# Patient Record
Sex: MALE | Race: WHITE | NOT HISPANIC OR LATINO | Employment: OTHER | URBAN - METROPOLITAN AREA
[De-identification: names, ages, dates, MRNs, and addresses within clinical notes are randomized per-mention and may not be internally consistent; named-entity substitution may affect disease eponyms.]

---

## 2020-05-14 ENCOUNTER — HOSPITAL ENCOUNTER (INPATIENT)
Facility: HOSPITAL | Age: 75
LOS: 12 days | Discharge: NURSING, REHAB OR LONG-TERM CARE | DRG: 064 | End: 2020-05-26
Attending: ANESTHESIOLOGY | Admitting: ANESTHESIOLOGY
Payer: MEDICARE

## 2020-05-14 DIAGNOSIS — E87.0 HYPERNATREMIA: ICD-10-CM

## 2020-05-14 DIAGNOSIS — I61.2 NONTRAUMATIC HEMORRHAGE OF LEFT CEREBRAL HEMISPHERE: ICD-10-CM

## 2020-05-14 DIAGNOSIS — I71.40 AAA (ABDOMINAL AORTIC ANEURYSM) WITHOUT RUPTURE: ICD-10-CM

## 2020-05-14 DIAGNOSIS — I61.9 NONTRAUMATIC INTRACEREBRAL HEMORRHAGE: ICD-10-CM

## 2020-05-14 DIAGNOSIS — I61.0 BASAL GANGLIA HEMORRHAGE: ICD-10-CM

## 2020-05-14 DIAGNOSIS — R06.82 TACHYPNEA: ICD-10-CM

## 2020-05-14 DIAGNOSIS — I61.9 ICH (INTRACEREBRAL HEMORRHAGE): ICD-10-CM

## 2020-05-14 DIAGNOSIS — G81.91 HEMIPARESIS, RIGHT: ICD-10-CM

## 2020-05-14 DIAGNOSIS — I10 ESSENTIAL HYPERTENSION: ICD-10-CM

## 2020-05-14 DIAGNOSIS — R13.12 OROPHARYNGEAL DYSPHAGIA: ICD-10-CM

## 2020-05-14 PROBLEM — E86.1 HYPOVOLEMIA: Status: ACTIVE | Noted: 2020-05-14

## 2020-05-14 PROBLEM — R47.1 DYSARTHRIA: Status: ACTIVE | Noted: 2020-05-14

## 2020-05-14 PROBLEM — I61.1 NONTRAUMATIC CORTICAL HEMORRHAGE OF LEFT CEREBRAL HEMISPHERE: Status: ACTIVE | Noted: 2020-05-14

## 2020-05-14 PROBLEM — G93.6 CYTOTOXIC CEREBRAL EDEMA: Status: ACTIVE | Noted: 2020-05-14

## 2020-05-14 LAB
ABO + RH BLD: NORMAL
BILIRUB UR QL STRIP: NEGATIVE
BLD GP AB SCN CELLS X3 SERPL QL: NORMAL
CHOLEST SERPL-MCNC: 183 MG/DL (ref 120–199)
CHOLEST SERPL-MCNC: 183 MG/DL (ref 120–199)
CHOLEST/HDLC SERPL: 3.4 {RATIO} (ref 2–5)
CHOLEST/HDLC SERPL: 3.4 {RATIO} (ref 2–5)
CLARITY UR REFRACT.AUTO: CLEAR
COLOR UR AUTO: YELLOW
ESTIMATED AVG GLUCOSE: 108 MG/DL (ref 68–131)
ESTIMATED AVG GLUCOSE: 108 MG/DL (ref 68–131)
GLUCOSE UR QL STRIP: ABNORMAL
HBA1C MFR BLD HPLC: 5.4 % (ref 4–5.6)
HBA1C MFR BLD HPLC: 5.4 % (ref 4–5.6)
HDLC SERPL-MCNC: 54 MG/DL (ref 40–75)
HDLC SERPL-MCNC: 54 MG/DL (ref 40–75)
HDLC SERPL: 29.5 % (ref 20–50)
HDLC SERPL: 29.5 % (ref 20–50)
HGB UR QL STRIP: ABNORMAL
KETONES UR QL STRIP: ABNORMAL
LDLC SERPL CALC-MCNC: 117.2 MG/DL (ref 63–159)
LDLC SERPL CALC-MCNC: 117.2 MG/DL (ref 63–159)
LEUKOCYTE ESTERASE UR QL STRIP: NEGATIVE
MICROSCOPIC COMMENT: ABNORMAL
NITRITE UR QL STRIP: NEGATIVE
NONHDLC SERPL-MCNC: 129 MG/DL
NONHDLC SERPL-MCNC: 129 MG/DL
PH UR STRIP: 8 [PH] (ref 5–8)
PROT UR QL STRIP: NEGATIVE
RBC #/AREA URNS AUTO: 52 /HPF (ref 0–4)
SP GR UR STRIP: 1.01 (ref 1–1.03)
TRIGL SERPL-MCNC: 59 MG/DL (ref 30–150)
TRIGL SERPL-MCNC: 59 MG/DL (ref 30–150)
TSH SERPL DL<=0.005 MIU/L-ACNC: 1.11 UIU/ML (ref 0.4–4)
TSH SERPL DL<=0.005 MIU/L-ACNC: 1.11 UIU/ML (ref 0.4–4)
URN SPEC COLLECT METH UR: ABNORMAL

## 2020-05-14 PROCEDURE — 92610 EVALUATE SWALLOWING FUNCTION: CPT

## 2020-05-14 PROCEDURE — 99291 PR CRITICAL CARE, E/M 30-74 MINUTES: ICD-10-PCS | Mod: GC,,, | Performed by: ANESTHESIOLOGY

## 2020-05-14 PROCEDURE — 36620 ARTERIAL LINE: ICD-10-PCS | Mod: ,,, | Performed by: INTERNAL MEDICINE

## 2020-05-14 PROCEDURE — 94761 N-INVAS EAR/PLS OXIMETRY MLT: CPT

## 2020-05-14 PROCEDURE — 99223 PR INITIAL HOSPITAL CARE,LEVL III: ICD-10-PCS | Mod: GC,,, | Performed by: PSYCHIATRY & NEUROLOGY

## 2020-05-14 PROCEDURE — 25000003 PHARM REV CODE 250

## 2020-05-14 PROCEDURE — 27000221 HC OXYGEN, UP TO 24 HOURS

## 2020-05-14 PROCEDURE — 86901 BLOOD TYPING SEROLOGIC RH(D): CPT

## 2020-05-14 PROCEDURE — 84443 ASSAY THYROID STIM HORMONE: CPT

## 2020-05-14 PROCEDURE — 20000000 HC ICU ROOM

## 2020-05-14 PROCEDURE — 81001 URINALYSIS AUTO W/SCOPE: CPT

## 2020-05-14 PROCEDURE — 25000003 PHARM REV CODE 250: Performed by: STUDENT IN AN ORGANIZED HEALTH CARE EDUCATION/TRAINING PROGRAM

## 2020-05-14 PROCEDURE — 80061 LIPID PANEL: CPT

## 2020-05-14 PROCEDURE — 99223 1ST HOSP IP/OBS HIGH 75: CPT | Mod: GC,,, | Performed by: PSYCHIATRY & NEUROLOGY

## 2020-05-14 PROCEDURE — 99223 PR INITIAL HOSPITAL CARE,LEVL III: ICD-10-PCS | Mod: ,,, | Performed by: PHYSICIAN ASSISTANT

## 2020-05-14 PROCEDURE — 99291 CRITICAL CARE FIRST HOUR: CPT | Mod: GC,,, | Performed by: ANESTHESIOLOGY

## 2020-05-14 PROCEDURE — 99223 1ST HOSP IP/OBS HIGH 75: CPT | Mod: ,,, | Performed by: PHYSICIAN ASSISTANT

## 2020-05-14 PROCEDURE — 83036 HEMOGLOBIN GLYCOSYLATED A1C: CPT

## 2020-05-14 PROCEDURE — 36620 INSERTION CATHETER ARTERY: CPT | Mod: ,,, | Performed by: INTERNAL MEDICINE

## 2020-05-14 RX ORDER — NICARDIPINE HYDROCHLORIDE 0.2 MG/ML
2.5 INJECTION INTRAVENOUS CONTINUOUS
Status: DISCONTINUED | OUTPATIENT
Start: 2020-05-14 | End: 2020-05-15

## 2020-05-14 RX ORDER — LIDOCAINE HYDROCHLORIDE 10 MG/ML
1 INJECTION INFILTRATION; PERINEURAL ONCE
Status: COMPLETED | OUTPATIENT
Start: 2020-05-14 | End: 2020-05-14

## 2020-05-14 RX ORDER — SODIUM CHLORIDE 0.9 % (FLUSH) 0.9 %
10 SYRINGE (ML) INJECTION
Status: DISCONTINUED | OUTPATIENT
Start: 2020-05-14 | End: 2020-05-26 | Stop reason: HOSPADM

## 2020-05-14 RX ORDER — SODIUM CHLORIDE 9 MG/ML
INJECTION, SOLUTION INTRAVENOUS CONTINUOUS
Status: DISCONTINUED | OUTPATIENT
Start: 2020-05-14 | End: 2020-05-20

## 2020-05-14 RX ORDER — LIDOCAINE HYDROCHLORIDE 10 MG/ML
INJECTION, SOLUTION EPIDURAL; INFILTRATION; INTRACAUDAL; PERINEURAL
Status: COMPLETED
Start: 2020-05-14 | End: 2020-05-14

## 2020-05-14 RX ADMIN — NICARDIPINE HYDROCHLORIDE 12.5 MG/HR: 0.2 INJECTION, SOLUTION INTRAVENOUS at 11:05

## 2020-05-14 RX ADMIN — SODIUM CHLORIDE: 0.9 INJECTION, SOLUTION INTRAVENOUS at 01:05

## 2020-05-14 RX ADMIN — LIDOCAINE HYDROCHLORIDE 1 ML: 10 INJECTION INFILTRATION; PERINEURAL at 03:05

## 2020-05-14 RX ADMIN — SODIUM CHLORIDE 1000 ML: 0.9 INJECTION, SOLUTION INTRAVENOUS at 11:05

## 2020-05-14 RX ADMIN — LIDOCAINE HYDROCHLORIDE 1 ML: 10 INJECTION, SOLUTION EPIDURAL; INFILTRATION; INTRACAUDAL at 03:05

## 2020-05-14 RX ADMIN — NICARDIPINE HYDROCHLORIDE 15 MG/HR: 0.2 INJECTION, SOLUTION INTRAVENOUS at 11:05

## 2020-05-14 NOTE — PLAN OF CARE
Due to COVID 19 restrictions limiting patient contact and visits, Discharge Planning Assessment completed via phone with patient's wife, Unique Vega [phone].    Patient is unable to answer questions (dysarthria).  Per wife, the patient lives with her in a single story house with no step(s) to enter.   Per wife, the patient was independent with ADLS and used no dme for ambulation.  Patient will have assistance from wife upon discharge.   Wife stated that if inpatient rehab is needed, she would like [facility].  Per chart, patient is legally blind at baseline.    All questions addressed.  CM will follow for needs.       05/14/20 1440   Discharge Assessment   Assessment Type Discharge Planning Assessment   Confirmed/corrected address and phone number on facesheet? Yes   Assessment information obtained from? Caregiver  (wife, Unique Vega [phone])   Expected Length of Stay (days) 5   Communicated expected length of stay with patient/caregiver yes   Prior to hospitilization cognitive status: Alert/Oriented   Prior to hospitalization functional status: Independent   Current cognitive status: Unable to Assess   Current Functional Status:   (stroke)   Facility Arrived From: [facility]   Lives With spouse   Able to Return to Prior Arrangements yes   Who are your caregiver(s) and their phone number(s)? wife, Unique Vega [phone]   Patient's perception of discharge disposition other (comments)  (hayden)   Readmission Within the Last 30 Days no previous admission in last 30 days   Patient currently being followed by outpatient case management? No   Patient currently receives any other outside agency services? No   Equipment Currently Used at Home none   Do you have any problems affording any of your prescribed medications? No   Is the patient taking medications as prescribed? yes   Does the patient have transportation home? Yes   Transportation Anticipated family or friend will provide   Does the patient receive  services at the [facility]? No   Discharge Plan A Rehab   Discharge Plan B Skilled Nursing Facility;Home Health   DME Needed Upon Discharge  other (see comments)  (tbd)   Patient/Family in Agreement with Plan yes                PCP:  Elyse Parham MD        Pharmacy:    [facility] - [place], [place] - [address]  [address] [place] [address]  Phone: [phone] Fax: [phone]        Emergency Contacts:  Extended Emergency Contact Information  Primary Emergency Contact: RAFAL DORANTES  Mobile Phone: [phone]  Relation: Spouse  Preferred language: English   needed? No  Secondary Emergency Contact: LEONARD DORANTES JR  Mobile Phone: [phone]  Relation: Son  Preferred language: English   needed? No      Insurance:    Payor: [employer] / Plan: [employer] CHOICES 65 / Product Type: Medicare Advantage /       05/14/2020  2:43 PM

## 2020-05-14 NOTE — NURSING
Patient arrived to [facility] from [place]  by Ann Reynolds    Type of stroke/diagnosis:  L ICH    TPA start and end time (if applicable) n/a    Thrombectomy start and end time (if applicable) n/a    Current symptoms: R sided weakness, dysarthria, decreased sensation on R side    Skin assessment done: Yes  Wounds noted: None  RENAN Armband Applied: Yes    NCC notified: SUNSHINE Long

## 2020-05-14 NOTE — HPI
74 yo M with no known PMHx (pt has never had PCP) presents to [facility] 05/14/20 due to finding of L ICH with IVH after presenting to [facility] with R hemiparesis and severe dysarthria.  Onset early this am.  Does have baseline vision deficit in R eye with worse central vision (noted by Neuro ICU to be legally blind).   He did have BP noted at OSH of 247/129 around 08:21 5/14/20.  Patient denies hx of headaches or intermittent vision blurring prior to admission.  Denies head trauma.  Was sleeping, on trying to move in bed could not move R side.  He otherwise denies personal or family hx of stroke, TIA, aneurysm, bleeding disorders, or clotting disorders.  No tobacco use for several years (> 20 yrs since he quit but did smoke again about 4-5 years ago for a few months).  Denies EtOH use.  Denies illicit use. Currently, pt has severe dysarthria, is somnolent, and has R-sided hemiparesis.  Denies h/a, nausea, or vision change from baseline.

## 2020-05-14 NOTE — PROCEDURES
"Brett Vega is a 75 y.o. male patient.    Temp: 98.6 °F (37 °C) (20 1505)  Pulse: (!) 116 (20 1505)  Resp: 20 (20 1505)  BP: (!) 153/85 (20 1505)  SpO2: 95 % (20 1505)  Weight: 95.4 kg (210 lb 5.1 oz) (20 1025)  Height: 5' 10" (177.8 cm) (20 1220)       Arterial Line  Date/Time: 2020 3:51 PM  Location procedure was performed: [facility]URO [facility]  Performed by: Tray Scott MD  Authorized by: Tray Scott MD   Assisting provider: Tray Scott MD  Pre-op Diagnosis: ICH  Post-operative diagnosis: ICH  Consent Done: Yes  Consent: Written consent obtained.  Risks and benefits: risks, benefits and alternatives were discussed  Consent given by: patient  Patient understanding: patient states understanding of the procedure being performed  Patient consent: the patient's understanding of the procedure matches consent given  Procedure consent: procedure consent matches procedure scheduled  Relevant documents: relevant documents present and verified  Test results: test results available and properly labeled  Site marked: the operative site was marked  Imaging studies: imaging studies available  Patient identity confirmed: , MRN, name, provided demographic data and verbally with patient  Time out: Immediately prior to procedure a "time out" was called to verify the correct patient, procedure, equipment, support staff and site/side marked as required.  Preparation: Patient was prepped and draped in the usual sterile fashion.  Indications: multiple ABGs and hemodynamic monitoring  Location: right radial    Anesthesia:  Local Anesthetic: lidocaine 1% without epinephrine  Patient sedated: no  Hermann's test normal: yes  Needle gauge: 22  Seldinger technique: Seldinger technique used  Number of attempts: 1  Complications: No  Estimated blood loss (mL): 1  Specimens: No  Implants: No  Post-procedure: dressing applied  Patient tolerance: Patient tolerated " the procedure well with no immediate complications          Tray Scott  5/14/2020

## 2020-05-14 NOTE — CONSULTS
Inpatient consult to Physical Medicine Rehab  Consult performed by: Gemini Eduardo NP  Consult ordered by: Krystal Alex MD  Reason for consult: assess rehab needs        Reviewed patient history and current admission.  Rehab team following.  Full consult to follow.    SHADE Martinez, FNP-C  Physical Medicine & Rehabilitation   05/14/2020

## 2020-05-14 NOTE — ASSESSMENT & PLAN NOTE
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  Of note, patient had SBP 240s documented at OSH.  Pt otherwise has no clear risk factors for ICH.  Does not appear to have cognitive deficits on exam, no reported concern for memory issues.  No medications, no EtOH use.  No recent trauma.  No unexplained weight loss or B symptoms.  Admitted to Neuro ICU w/ NSGY following.  Etiology likely hypertensive, rule out vascular malformation w/ further imaging.    Antithrombotics for secondary stroke prevention:  None--ICH  Statins for secondary stroke prevention/HLD: LDL elevated--would not start statin acutely in setting of ICH, but could discuss low dose statin for reduction of stroke risk factors in stroke clinic follow up  Aggressive risk factor modification: HTN  Rehab efforts: PT/OT/SLP/PM&R  Diagnostics ordered/pending: CTA head to eval for vascular malformation  VTE prophylaxis: None: Reason for No Pharmacological VTE Prophylaxis: History of systemic or intracranial bleeding  BP parameters: ICH: SBP <140

## 2020-05-14 NOTE — ASSESSMENT & PLAN NOTE
76 y/o M w/ no known PMH who presented with new onset RSW and dysarthria, found to have L caudate tail/thalamic ICH with IVH (ICH score 2):    --Patient admitted to [facility] on telemetry      -q1h neurochecks in ICU  --All labs and diagnostics reviewed  --CTH 5/14 shows L caudate/thalamic ICH with IVH in lateral ventricles (L>R), 3rd, and 4th ventricles. Mild prominence of ventricles but no overt hydrocephalus.  --Follow-up CTH 6h after initial for stability, ordered for 2pm today  --No EVD or other surgical intervention at this time, will follow exam closely for any deterioration and FU repeat scan.  --No anti-plt/coag medications  --HTN: SBP <140 (continue cardene ggt; hydralazine & labetalol PRN)  --Na >135  --HOB >30  --Full pre-op labs reviewed, coags WNL, T&S in process   --Covid test negative 5/14  --Keep NPO at this time for possible operative intervention  --Continue to monitor clinically, notify NSGY immediately with any changes in neuro status  --Please call with any questions or concerns    Dispo: TBD    Discussed with Dr. Cuevas

## 2020-05-14 NOTE — SUBJECTIVE & OBJECTIVE
No past medical history on file.  No past surgical history on file.   Current Facility-Administered Medications on File Prior to Encounter   Medication Dose Route Frequency Provider Last Rate Last Dose    [DISCONTINUED] niCARdipine 40 mg/200 mL infusion  5 mg/hr Intravenous Continuous Brett Alcazar MD        [DISCONTINUED] niCARdipine 40 mg/200 mL infusion  5 mg/hr Intravenous Continuous Brett Alcazar MD 37.5 mL/hr at 05/14/20 0920 7.5 mg/hr at 05/14/20 0920     No current outpatient medications on file prior to encounter.      Allergies: Patient has no known allergies.    No family history on file.  Social History     Tobacco Use    Smoking status: Not on file   Substance Use Topics    Alcohol use: Not on file    Drug use: Not on file     Review of Systems   Constitutional: Negative for chills, diaphoresis and fever.   HENT: Negative for congestion.    Eyes: Positive for visual disturbance.   Respiratory: Negative for wheezing.    Cardiovascular: Negative for chest pain and palpitations.   Gastrointestinal: Negative for abdominal distention and abdominal pain.   Neurological: Positive for facial asymmetry, speech difficulty and weakness. Negative for seizures and headaches.   Psychiatric/Behavioral: Negative for agitation and behavioral problems.        Objective:     Vitals:         Temp  Min: 97.8 °F (36.6 °C)  Max: 97.8 °F (36.6 °C)  Pulse  Min: 94  Max: 105  BP  Min: 108/76  Max: 181/96  MAP (mmHg)  Min: 87  Max: 131  Resp  Min: 20  Max: 27  SpO2  Min: 90 %  Max: 93 %    No intake/output data recorded.           Physical Exam   Constitutional: He appears well-developed and well-nourished.   HENT:   Head: Normocephalic and atraumatic.   Eyes: Pupils are equal, round, and reactive to light. Conjunctivae and EOM are normal.   Neck: Normal range of motion. Neck supple.   Cardiovascular: Normal rate, regular rhythm, normal heart sounds and intact distal pulses.   Pulmonary/Chest: Breath sounds  normal.   Abdominal: Soft. Bowel sounds are normal. He exhibits no distension.   Skin: Skin is warm and dry.   Psychiatric: He has a normal mood and affect.     Neurologic exam  Awake, alert, following commands  Dysarthric  PERRL, EOMI  R facial droop, V1-V3 intact  Unable to resist gravity on RUE/RLE, minimal movements  5/5 strength LUE/LLE  Decreased light touch RUE/RLE   Normal FN on the left    Today I personally reviewed pertinent medications, imaging, laboratory results.   CTH:  Evidence of intraparenchymal hemorrhage centered in the region of the posterior limb of the left internal capsule with intraventricular extension.

## 2020-05-14 NOTE — CONSULTS
[facility]  Vascular Neurology  Comprehensive Stroke Center  Consult Note    Consults  Assessment/Plan:     Nontraumatic intracerebral hemorrhage  74 yo M with no known PMHx presents to [facility] 05/14/2020 transferred from OSH for large L thalamic and caudate ICH with intraventricular extension.  Of note, patient had SBP 240s documented at OSH.  Pt otherwise has no clear risk factors for ICH.  Does not appear to have cognitive deficits on exam, no reported concern for memory issues.  No medications, no EtOH use.  No recent trauma.  No unexplained weight loss or B symptoms.  Admitted to Neuro ICU w/ NSGY following.  Etiology likely hypertensive, rule out vascular malformation w/ further imaging.    Antithrombotics for secondary stroke prevention:  None--ICH  Statins for secondary stroke prevention/HLD: LDL elevated--would not start statin acutely in setting of ICH, but could discuss low dose statin for reduction of stroke risk factors in stroke clinic follow up  Aggressive risk factor modification: HTN  Rehab efforts: PT/OT/SLP/PM&R  Diagnostics ordered/pending: CTA head to eval for vascular malformation  VTE prophylaxis: None: Reason for No Pharmacological VTE Prophylaxis: History of systemic or intracranial bleeding  BP parameters: ICH: SBP <140    Essential hypertension  -Stroke risk factor, likely etiology of ICH in this pt  -Goal SBP < 140, consider starting po anti-hypertensives as needed to wean nicardipine    Hemiparesis, right  -2/2 ICH, continue PT eval and treat    Dysarthria  -2/2 ICH, continue SLP eval and treat    STROKE DOCUMENTATION     Acute Stroke Times   Last Known Normal Time: 0300  Symptom Onset Date: 05/14/20  Symptom Onset Time: 0700  Stroke Team Called Time: 0806  Stroke Team Arrival Time: 0809  CT Interpretation Time: 0809  Decision to Treat Time for Alteplase: (Contraindicated 2/2 ICH)  Decision to Treat Time for IR: (no LVO)    NIH Scale:  Interval: baseline  1a. Level of  Consciousness: 1-->Not alert, but arousable by minor stimulation to obey, answer, or respond  1b. LOC Questions: 1-->Answers one question correctly  1c. LOC Commands: 0-->Performs both tasks correctly  2. Best Gaze: 0-->Normal  3. Visual: 1-->Partial hemianopia(Pt notes baseline poor vision in R eye on testing)  4. Facial Palsy: 1-->Minor paralysis (flattened nasolabial fold, asymmetry on smiling)  5a. Motor Arm, Left: 0-->No drift, limb holds 90 (or 45) degrees for full 10 secs  5b. Motor Arm, Right: 3-->No effort against gravity, limb falls  6a. Motor Leg, Left: 0-->No drift, leg holds 30 degree position for full 5 secs  6b. Motor Leg, Right: 3-->No effort against gravity, leg falls to bed immediately  7. Limb Ataxia: 0-->Absent  8. Sensory: 1-->Mild-to-moderate sensory loss, patient feels pinprick is less sharp or is dull on the affected side, or there is a loss of superficial pain with pinprick, but patient is aware of being touched  9. Best Language: 0-->No aphasia, normal  10. Dysarthria: 2-->Severe dysarthria, patients speech is so slurred as to be unintelligible in the absence of or out of proportion to any dysphasia, or is mute/anarthric  11. Extinction and Inattention (formerly Neglect): 0-->No abnormality  Total (NIH Stroke Scale): 13    Modified Falls Score: 0  Tammy Coma Scale:15   ABCD2 Score:    [id number] Score:   HAS -BLED Score:   ICH Score:1  Hunt & Mccray Classification:     Thrombolysis Candidate? No, CT findings (ICH, SAH, etc)     Delays to Thrombolysis?  No    Interventional Revascularization Candidate?   Is the patient eligible for mechanical endovascular reperfusion (JOSE FRANCISCO)?  No; ICH/ SAH     Hemorrhagic change of an Ischemic Stroke: Does this patient have an ischemic stroke with hemorrhagic changes? No     Subjective:     History of Present Illness:  76 yo M with no known PMHx (pt has never had PCP) [place] [facility] [place] due to finding of L ICH with IVH after presenting to [facility] with R hemiparesis and severe dysarthria.  Onset early this am.  Does have baseline vision deficit in R eye with worse central vision (noted by Neuro ICU to be legally blind).   He did have BP noted at OSH of 247/129 around 08:21 5/14/20.  Patient denies hx of headaches or intermittent vision blurring prior to admission.  Denies head trauma.  Was sleeping, on trying to move in bed could not move R side.  He otherwise denies personal or family hx of stroke, TIA, aneurysm, bleeding disorders, or clotting disorders.  No tobacco use for several years (> 20 yrs since he quit but did smoke again about 4-5 years ago for a few months).  Denies EtOH use.  Denies illicit use. Currently, pt has severe dysarthria, is somnolent, and has R-sided hemiparesis.  Denies h/a, nausea, or vision change from baseline.        History reviewed. No pertinent past medical history.  History reviewed. No pertinent surgical history.  History reviewed. No pertinent family history.  Social History     Tobacco Use    Smoking status: Former Smoker    Smokeless tobacco: Never Used   Substance Use Topics    Alcohol use: Not on file    Drug use: Not on file     Review of patient's allergies indicates:  No Known Allergies    Medications: I have reviewed the current medication administration record.    No medications prior to admission.     Current Facility-Administered Medications:     0.9%  NaCl infusion, , Intravenous, Continuous, Krystal Alex MD, Last Rate: 75 mL/hr at 05/14/20 1505    niCARdipine 40 mg/200 mL infusion, 2.5 mg/hr, Intravenous, Continuous, Krystal Alex MD, Last Rate: 62.5 mL/hr at 05/14/20 1505, 12.5 mg/hr at 05/14/20 1505    sodium chloride 0.9% flush 10 mL, 10 mL, Intravenous, PRN, Krystal Alex MD    Review of Systems   Constitutional: Negative for chills and fever.   HENT: Negative for rhinorrhea and sneezing.    Eyes: Positive for visual disturbance (Baseline R eye poor  vision).   Respiratory: Negative for cough and shortness of breath.    Cardiovascular: Negative for palpitations and leg swelling.   Gastrointestinal: Negative for nausea and vomiting.   Musculoskeletal: Negative for neck pain and neck stiffness.   Skin: Negative for rash and wound.   Neurological: Positive for speech difficulty and weakness. Negative for numbness and headaches.   Hematological: Negative for adenopathy. Does not bruise/bleed easily.   Psychiatric/Behavioral: Positive for decreased concentration. Negative for agitation and confusion.     Objective:     Vital Signs (Most Recent):  Temp: 98.6 °F (37 °C) (05/14/20 1505)  Pulse: (!) 116 (05/14/20 1505)  Resp: 20 (05/14/20 1505)  BP: (!) 153/85 (05/14/20 1505)  SpO2: 95 % (05/14/20 1505)    Vital Signs Range (Last 24H):  Temp:  [97.8 °F (36.6 °C)-98.6 °F (37 °C)]   Pulse:  []   Resp:  [18-27]   BP: (108-181)/(59-96)   SpO2:  [90 %-96 %]     Physical Exam  General:  Well-developed, well-nourished, nad  HEENT:  NCAT, PERRLA, EOMI, oropharyngeal membranes non-erythematous/without exudate  Neck:  Supple, normal ROM without nuchal rigidity  Resp:  Symmetric expansion, no increased wob  CVS:  No LE edema, peripheral pulses 2+ (radial, dorsalis pedis)  GI:  Abd soft, non-distended, non-tender to palpation  Neurologic Exam:  Mental Status:  AAOx3.  Speech, thought content appropriate.  Able to spell 'world' forward and 'backward.'  Recent, remote recall 3/3.  Cranial Nerves:  VFs intact on counting fingers in all quadrants bilaterally with testing L eye.  With R eye, unable to see centrally but has some intact peripheral vision.  PERRLA, EOMI.  Face with slight R nasolabial fold flattening, not significant on facial movement.  Palate raises symmetrically, tongue protrudes midline.  Trapezius 5/5 bilaterally.  Motor:  LUE/LLE with normal bulk and tone, 5/5 on strength testing.  RUE flaccid, no movement apart from shoulder   Sensory:  Intact to light touch at  all extremities and face without inattention.  Coordination:  RUE limited, pt declines HTS.  LUE w/ intact FNF.  Gait:  Deferred 2/2 fall risk    Neurological Exam:   LOC: drowsy  Attention Span: Good   Language: No aphasia  Articulation: Dysarthria  Orientation: Person, Place, Time   Visual Fields: Full  Baseline R vision loss--has trouble with central vision in this eye, peripheral vision intact  EOM (CN III, IV, VI): Full/intact  Pupils (CN II, III): PERRL  Facial Sensation (CN V): Normal  Facial Movement (CN VII): Mild R nasolabial fold flattening  Motor: Arm left  Normal 5/5  Leg left  Normal 5/5  Arm right  Plegia 0/5  Leg right Plegia 0/5  Cebellar: No evidence of appendicular or axial ataxia  Sensation: Intact to light touch, temperature and vibration  Tone: Flaccid  LUE  and LLE    Laboratory:  CMP:   Recent Labs   Lab 20  0820   CALCIUM 9.2   ALBUMIN 4.8   PROT 8.6*      K 4.1   CO2 27   CL 99   BUN 11   CREATININE 0.70*   ALKPHOS 68   ALT 17   AST 31   BILITOT 0.7     CBC:   Recent Labs   Lab 20  0820   WBC 9.50   RBC 5.24   HGB 15.0   HCT 46.1      MCV 88   MCH 28.7   MCHC 32.6     Lipid Panel:   Recent Labs   Lab 20  1251   CHOL 183  183   LDLCALC 117.2  117.2   HDL 54  54   TRIG 59  59     Coagulation:   Recent Labs   Lab 20  0820   INR 1.0   APTT 26.0     Hgb A1C:   Recent Labs   Lab 20  1251   HGBA1C 5.4  5.4     TSH:   Recent Labs   Lab 20  1251   TSH 1.114  1.114     Diagnostic Results:    Brain imagin2020 CT head w/o contrast:       Vessel Imaging:  Pending    Cardiac Evaluation:   Pending    Nani Garcia MD  Comprehensive Stroke Center  Department of Vascular Neurology   [facility]

## 2020-05-14 NOTE — HPI
76 yo male with no PMHX and no contact with health care his entire adult life transferred from [facility] after he presented there for new onset of dense right hemiparesis and marked dysarthria. Onset unclear but likely around 3am this morning. No recent illness. Legally blind. BP on arrival to outside facility 247/129. On Cardene ggt. 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 Telemedicine-stroke done by Dr Looney.  Currently pt awake, alert, oriented, following commands. No movement against gravity on the right side. On Cardene ggt. STAT consult to NSGY. Awaiting recs.

## 2020-05-14 NOTE — SUBJECTIVE & OBJECTIVE
History reviewed. No pertinent past medical history.  History reviewed. No pertinent surgical history.  History reviewed. No pertinent family history.  Social History     Tobacco Use    Smoking status: Former Smoker    Smokeless tobacco: Never Used   Substance Use Topics    Alcohol use: Not on file    Drug use: Not on file     Review of patient's allergies indicates:  No Known Allergies    Medications: I have reviewed the current medication administration record.    No medications prior to admission.     Current Facility-Administered Medications:     0.9%  NaCl infusion, , Intravenous, Continuous, Krystal Alex MD, Last Rate: 75 mL/hr at 05/14/20 1505    niCARdipine 40 mg/200 mL infusion, 2.5 mg/hr, Intravenous, Continuous, Krystal Alex MD, Last Rate: 62.5 mL/hr at 05/14/20 1505, 12.5 mg/hr at 05/14/20 1505    sodium chloride 0.9% flush 10 mL, 10 mL, Intravenous, PRN, Krystal Alex MD    Review of Systems   Constitutional: Negative for chills and fever.   HENT: Negative for rhinorrhea and sneezing.    Eyes: Positive for visual disturbance (Baseline R eye poor vision).   Respiratory: Negative for cough and shortness of breath.    Cardiovascular: Negative for palpitations and leg swelling.   Gastrointestinal: Negative for nausea and vomiting.   Musculoskeletal: Negative for neck pain and neck stiffness.   Skin: Negative for rash and wound.   Neurological: Positive for speech difficulty and weakness. Negative for numbness and headaches.   Hematological: Negative for adenopathy. Does not bruise/bleed easily.   Psychiatric/Behavioral: Positive for decreased concentration. Negative for agitation and confusion.     Objective:     Vital Signs (Most Recent):  Temp: 98.6 °F (37 °C) (05/14/20 1505)  Pulse: (!) 116 (05/14/20 1505)  Resp: 20 (05/14/20 1505)  BP: (!) 153/85 (05/14/20 1505)  SpO2: 95 % (05/14/20 1505)    Vital Signs Range (Last 24H):  Temp:  [97.8 °F (36.6 °C)-98.6  °F (37 °C)]   Pulse:  []   Resp:  [18-27]   BP: (108-181)/(59-96)   SpO2:  [90 %-96 %]     Physical Exam  General:  Well-developed, well-nourished, nad  HEENT:  NCAT, PERRLA, EOMI, oropharyngeal membranes non-erythematous/without exudate  Neck:  Supple, normal ROM without nuchal rigidity  Resp:  Symmetric expansion, no increased wob  CVS:  No LE edema, peripheral pulses 2+ (radial, dorsalis pedis)  GI:  Abd soft, non-distended, non-tender to palpation  Neurologic Exam:  Mental Status:  AAOx3.  Speech, thought content appropriate.  Able to spell 'world' forward and 'backward.'  Recent, remote recall 3/3.  Cranial Nerves:  VFs intact on counting fingers in all quadrants bilaterally with testing L eye.  With R eye, unable to see centrally but has some intact peripheral vision.  PERRLA, EOMI.  Face with slight R nasolabial fold flattening, not significant on facial movement.  Palate raises symmetrically, tongue protrudes midline.  Trapezius 5/5 bilaterally.  Motor:  LUE/LLE with normal bulk and tone, 5/5 on strength testing.  RUE flaccid, no movement apart from shoulder   Sensory:  Intact to light touch at all extremities and face without inattention.  Coordination:  RUE limited, pt declines HTS.  LUE w/ intact FNF.  Gait:  Deferred 2/2 fall risk    Neurological Exam:   LOC: drowsy  Attention Span: Good   Language: No aphasia  Articulation: Dysarthria  Orientation: Person, Place, Time   Visual Fields: Full  Baseline R vision loss--has trouble with central vision in this eye, peripheral vision intact  EOM (CN III, IV, VI): Full/intact  Pupils (CN II, III): PERRL  Facial Sensation (CN V): Normal  Facial Movement (CN VII): Mild R nasolabial fold flattening  Motor: Arm left  Normal 5/5  Leg left  Normal 5/5  Arm right  Plegia 0/5  Leg right Plegia 0/5  Cebellar: No evidence of appendicular or axial ataxia  Sensation: Intact to light touch, temperature and vibration  Tone: Flaccid  LUE  and LLE    Laboratory:  CMP:    Recent Labs   Lab 20  0820   CALCIUM 9.2   ALBUMIN 4.8   PROT 8.6*      K 4.1   CO2 27   CL 99   BUN 11   CREATININE 0.70*   ALKPHOS 68   ALT 17   AST 31   BILITOT 0.7     CBC:   Recent Labs   Lab 20  0820   WBC 9.50   RBC 5.24   HGB 15.0   HCT 46.1      MCV 88   MCH 28.7   MCHC 32.6     Lipid Panel:   Recent Labs   Lab 20  1251   CHOL 183  183   LDLCALC 117.2  117.2   HDL 54  54   TRIG 59  59     Coagulation:   Recent Labs   Lab 20  0820   INR 1.0   APTT 26.0     Hgb A1C:   Recent Labs   Lab 20  1251   HGBA1C 5.4  5.4     TSH:   Recent Labs   Lab 20  1251   TSH 1.114  1.114     Diagnostic Results:    Brain imagin2020 CT head w/o contrast:       Vessel Imaging:  Pending    Cardiac Evaluation:   Pending

## 2020-05-14 NOTE — HOSPITAL COURSE
05/14/2020:  [place] [facility] [place] [facility] for L ICH/IVH from caudate and thalamus  05/15/2020:   Continues on cardene for SBP < 140, neurosurgery following.  05/16/2020:   Started back on cardene for SBP < 140 this AM  05/18/2020:  Off Cardene drip, placed on Losartan and Hydralazine. Lovenox.  05/19/2010:  /180. Still with Rt side weakness.   5/20: Stepped down overnight. Increased work of breathing, tachypnic.  IVF discontinued.  Crackles in bases and expiratory wheezes.  CXR with pulmonary edema.  Lasix given with good output but no sustained improvement of breathing.  Currently COVID rule out.  IM following. Blood pressure elevated - changed to carvedilol 12.5mg BID, Norvasc 10mg daily, with plans to start HCTZ tomorrow.  Continuing Hydralazine until BP stable and can change to PRN only. TF on hold due to respiratory status.  Scheduled for MBSS tomorrow if able to tolerate.  5/21 COVID test negative, tachypnea persisting, hospital medicine assisting, abdominal XR and US LE pending, additional IV lasix dose ordered, patient failed MBSS, will discuss PEG placement  5/22 patient had PEG placed this morning, weaning hydralazine and increasing HCTZ. Repeat CXR pending to f/u on pulmonary edema  05/23 Started tube feeds and enteral water boluses through PEG tube today   05/24 Hypernatremic overnight.  Likely due to volume depletion.  Hospital medicine increasing free water boluses and will get a BMP this afternoon.  Urine studies pending.  Nursing staff found patient drinking mouthwash.  Will continue to monitor for signs of aspiration.  Breathing improving.  5/25: NAEON. Na 150 this AM, free water increased by HM to 500q6h. Maintaining sats on 2L NC. Last BM 5/24. Pending IPR.   5/26: NAEON. Na 143 this AM. FWF decreased to 350q6h yesterday following evening BMP. Hydralazine/Norvasc DC'd by HM. SBP  this AM, controlled with Coreg/HCTZ/Losartan. Pending IPR.

## 2020-05-14 NOTE — SUBJECTIVE & OBJECTIVE
No medications prior to admission.       Review of patient's allergies indicates:  No Known Allergies    History reviewed. No pertinent past medical history.  History reviewed. No pertinent surgical history.  Family History     None        Tobacco Use    Smoking status: Former Smoker    Smokeless tobacco: Never Used   Substance and Sexual Activity    Alcohol use: Not on file    Drug use: Not on file    Sexual activity: Not on file     Review of Systems   Constitutional: Negative for chills and fever.   HENT: Negative for rhinorrhea and trouble swallowing.    Eyes: Positive for visual disturbance (at baseline). Negative for photophobia.   Respiratory: Negative for cough and shortness of breath.    Cardiovascular: Negative for chest pain and palpitations.   Gastrointestinal: Negative for nausea and vomiting.   Genitourinary: Negative for difficulty urinating and dysuria.   Musculoskeletal: Negative for back pain and neck pain.   Skin: Negative for rash and wound.   Neurological: Positive for facial asymmetry, speech difficulty and weakness. Negative for seizures, numbness and headaches.   Hematological: Negative for adenopathy. Does not bruise/bleed easily.   Psychiatric/Behavioral: Negative for behavioral problems and confusion.     Objective:     Weight: 95.4 kg (210 lb 5.1 oz)  Body mass index is 30.18 kg/m².  Vital Signs (Most Recent):  Temp: 97.8 °F (36.6 °C) (05/14/20 1105)  Pulse: 90 (05/14/20 1220)  Resp: 19 (05/14/20 1220)  BP: 121/70 (05/14/20 1205)  SpO2: 95 % (05/14/20 1220) Vital Signs (24h Range):  Temp:  [97.8 °F (36.6 °C)] 97.8 °F (36.6 °C)  Pulse:  [] 90  Resp:  [18-27] 19  SpO2:  [90 %-96 %] 95 %  BP: (108-181)/(59-96) 121/70     Date 05/14/20 0700 - 05/15/20 0659   Shift 5769-3868 0996-8658 8644-2354 24 Hour Total   INTAKE   I.V.(mL/kg) 76.7(0.8)   76.7(0.8)   IV Piggyback 1000   1000   Shift Total(mL/kg) 1076.7(11.3)   1076.7(11.3)   OUTPUT   Shift Total(mL/kg)       Weight (kg) 95.4  95.4 95.4 95.4              Oxygen Concentration (%):  [32] 32    Male External Urinary Catheter 05/14/20 1146 Medium (Active)   Collection Container Standard drainage bag 5/14/2020 11:05 AM   Securement Method secured to top of thigh w/ adhesive device 5/14/2020 11:05 AM   Skin no redness;no breakdown 5/14/2020 11:05 AM   Tolerance no signs/symptoms of discomfort 5/14/2020 11:05 AM   Catheter Change Date 05/14/20 5/14/2020 11:05 AM   Catheter Change Time 1105 5/14/2020 11:05 AM       Neurosurgery Physical Exam    General: well developed, well nourished, no distress.   Head: normocephalic, atraumatic  Neck: No tracheal deviation. No palpable masses. Full ROM.   Neurologic: Alert and oriented. Thought content appropriate. Follows commands.  GCS: Motor: 6/Verbal: 5/Eyes: 4 GCS Total: 15  Mental Status: Awake, Alert, Oriented x 4  Language: No aphasia  Speech: Dysarthric  Cranial nerves: R facial droop, tongue midline, CN II-XII grossly intact.   Eyes: pupils equal, round, reactive to light with accomodation, EOMI.   Ears: No drainage.   Pulmonary: normal respirations, no signs of respiratory distress  Abdomen: soft, non-distended, not tender to palpation    Sensory: intact to light touch throughout  Motor Strength: LUE/LLE full strength and following commands. R-sided hemiparesis. No abnormal movements seen.     Clonus: present on R  Babinski: absent  Vascular: Pulses 2+ and symmetric radial and dorsalis pedis. No LE edema.   Skin: Skin is warm, dry and intact.      Significant Labs:  Recent Labs   Lab 05/14/20 0820   *      K 4.1   CL 99   CO2 27   BUN 11   CREATININE 0.70*   CALCIUM 9.2     Recent Labs   Lab 05/14/20 0820   WBC 9.50   HGB 15.0   HCT 46.1        Recent Labs   Lab 05/14/20 0820   LABPT 13.4   INR 1.0   APTT 26.0     Microbiology Results (last 7 days)     ** No results found for the last 168 hours. **        All pertinent labs from the last 24 hours have been  reviewed.    Significant Diagnostics:  I have reviewed and interpreted all pertinent imaging results/findings within the past 24 hours.     [facility] 5/14 0813:  I have reviewed the imaging and agree with the report below.    There is amorphous, somewhat rounded hyperdensity in the region of the posterior limb of the left internal capsule measuring 1.9 x 2.1 cm in axial dimensions with surrounding subtle hypodense edema consistent with intraparenchymal hemorrhage.  There is associated intraventricular extension of the hemorrhage as evidenced by hyperdensity within the lateral ventricles left greater than right.  Hemorrhage also extends into the 3rd ventricle.  Ventricles are mildly prominent.  No midline shift.  Hemorrhage also present within the 4th ventricle and cerebral aqueduct extending into the upper spinal canal.  Nonspecific periventricular white matter change most likely related to sequela of chronic small vessel ischemia.

## 2020-05-14 NOTE — NURSING
Notified SUNSHINE Long of pt's increased dysarthria and disorientation.  CT scan already scheduled for this time.  Pt transported to and from CT scan.  VSS.  NINFA.

## 2020-05-14 NOTE — PT/OT/SLP EVAL
Speech Language Pathology Evaluation  Bedside Swallow    Patient Name:  Brett Vega   MRN:  [id number]  Admitting Diagnosis: <principal problem not specified>    Recommendations:                 General Recommendations:  Dysphagia therapy  Diet recommendations:  NPO, NPO   Aspiration Precautions: Meds crushed in puree and Strict aspiration precautions   General Precautions: Standard, aspiration, fall  Communication strategies: speech strategies    History:     History reviewed. No pertinent past medical history.    History reviewed. No pertinent surgical history.    Prior diet: regular/thin    Subjective     Awake/alert    Pain/Comfort:  · Pain Rating 1: 0/10  · Pain Rating Post-Intervention 1: 0/10    Objective:     Oral Musculature Evaluation  · Oral Musculature: right weakness  · Dentition: edentulous(dentures not present)  · Oral Labial Strength and Mobility: impaired retraction  · Lingual Strength and Mobility: functional strength, functional protrusion  · Voice Prior to PO Intake: clear, severe dysarthria    Bedside Swallow Eval:   Consistencies Assessed:  · Thin liquids x1 cup  · Nectar thick liquids x2 cup  · Puree x2     Oral Phase:   · WFL   · Mild anterior loss with both thin and nectar thick liquids    Pharyngeal Phase:   · Coughing/choking immediately post thin liquids and delayed post nectar thick liquids  · no overt clinical signs/symptoms of aspiration      Assessment:     Brett Vega is a 75 y.o. male with an SLP diagnosis of Dysphagia.    Goals:   Multidisciplinary Problems     SLP Goals        Problem: SLP Goal    Goal Priority Disciplines Outcome   SLP Goal     SLP    Description:  Speech Language Pathology Goals  Goals expected to be met by 5/21    1. Pt will participate in ongoing swallow assessment  2. Pt will participate in speech language cognitive eval                          Plan:     · Patient to be seen:  5 x/week   · Plan of Care reviewed with:  patient   · SLP Follow-Up:  Yes        Discharge recommendations:    TBD    Time Tracking:     SLP Treatment Date:   05/14/20  Speech Start Time:  1338  Speech Stop Time:  1348     Speech Total Time (min):  10 min    Billable Minutes: Eval Swallow and Oral Function 10    Johnna Duran CCC-SLP  05/14/2020

## 2020-05-14 NOTE — ASSESSMENT & PLAN NOTE
-New onset of RSW and dysarthria in setting of /129 on presentation, no meds at home.  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   -Telemedicine-stroke done by Dr Looney  -Admitted to [facility]  -q1hr neurocheck  -SBP goal <140 9 On Cardene ggt, currently at goal  -Vascular neurology and STAT NSGY consult. Appreciate recs.  -PT/INR, PTT wnl

## 2020-05-14 NOTE — NURSING
Notified [facility] of pt's decrease in orientation and RUE not moving spontaneously only withdrawing.  No new orders at this time.  WCTM.

## 2020-05-14 NOTE — ASSESSMENT & PLAN NOTE
-BP on arrival to outside facility 247/129, required Cardene ggt  -SBP goal <140, currently on cardene ggt, at goal.  -

## 2020-05-14 NOTE — CONSULTS
[facility]  Neurosurgery  Consult Note    Inpatient consult to Neurosurgery  Consult performed by: Kacey Moreno PA-C  Consult ordered by: Krystal Alex MD        Subjective:     Chief Complaint/Reason for Admission: ICH    History of Present Illness: Brett Vega is a 75 y.o. male with no known PMH and no contact with healthcare who [place] [facility] new onset of R hemiparesis and dysarthria. Patient believes onset was around 3 am. Wife awoke this morning and found him unable to more R side with slurred speech so called EMS. BP on arrival to OSH was 247/129. He was started on a cardene gtt. CTH revealed L caudate/thalamic ICH w/ IVH. NSGY consulted for evaluation. Upon my exam, pt has GCS 15, AAOx3 and FC on L side, hemiparetic on R with significant dysarthria. He is legally blind at baseline, denies any acute visual changes. Denies any other complaints. He takes no blood thinners or any other medications.      No medications prior to admission.       Review of patient's allergies indicates:  No Known Allergies    History reviewed. No pertinent past medical history.  History reviewed. No pertinent surgical history.  Family History     None        Tobacco Use    Smoking status: Former Smoker    Smokeless tobacco: Never Used   Substance and Sexual Activity    Alcohol use: Not on file    Drug use: Not on file    Sexual activity: Not on file     Review of Systems   Constitutional: Negative for chills and fever.   HENT: Negative for rhinorrhea and trouble swallowing.    Eyes: Positive for visual disturbance (at baseline). Negative for photophobia.   Respiratory: Negative for cough and shortness of breath.    Cardiovascular: Negative for chest pain and palpitations.   Gastrointestinal: Negative for nausea and vomiting.   Genitourinary: Negative for difficulty urinating and dysuria.   Musculoskeletal: Negative for back pain and neck pain.   Skin: Negative  for rash and wound.   Neurological: Positive for facial asymmetry, speech difficulty and weakness. Negative for seizures, numbness and headaches.   Hematological: Negative for adenopathy. Does not bruise/bleed easily.   Psychiatric/Behavioral: Negative for behavioral problems and confusion.     Objective:     Weight: 95.4 kg (210 lb 5.1 oz)  Body mass index is 30.18 kg/m².  Vital Signs (Most Recent):  Temp: 97.8 °F (36.6 °C) (05/14/20 1105)  Pulse: 90 (05/14/20 1220)  Resp: 19 (05/14/20 1220)  BP: 121/70 (05/14/20 1205)  SpO2: 95 % (05/14/20 1220) Vital Signs (24h Range):  Temp:  [97.8 °F (36.6 °C)] 97.8 °F (36.6 °C)  Pulse:  [] 90  Resp:  [18-27] 19  SpO2:  [90 %-96 %] 95 %  BP: (108-181)/(59-96) 121/70     Date 05/14/20 0700 - 05/15/20 0659   Shift 5621-0077 1949-7788 7110-7080 24 Hour Total   INTAKE   I.V.(mL/kg) 76.7(0.8)   76.7(0.8)   IV Piggyback 1000   1000   Shift Total(mL/kg) 1076.7(11.3)   1076.7(11.3)   OUTPUT   Shift Total(mL/kg)       Weight (kg) 95.4 95.4 95.4 95.4              Oxygen Concentration (%):  [32] 32    Male External Urinary Catheter 05/14/20 1146 Medium (Active)   Collection Container Standard drainage bag 5/14/2020 11:05 AM   Securement Method secured to top of thigh w/ adhesive device 5/14/2020 11:05 AM   Skin no redness;no breakdown 5/14/2020 11:05 AM   Tolerance no signs/symptoms of discomfort 5/14/2020 11:05 AM   Catheter Change Date 05/14/20 5/14/2020 11:05 AM   Catheter Change Time 1105 5/14/2020 11:05 AM       Neurosurgery Physical Exam    General: well developed, well nourished, no distress.   Head: normocephalic, atraumatic  Neck: No tracheal deviation. No palpable masses. Full ROM.   Neurologic: Alert and oriented. Thought content appropriate. Follows commands.  GCS: Motor: 6/Verbal: 5/Eyes: 4 GCS Total: 15  Mental Status: Awake, Alert, Oriented x 4  Language: No aphasia  Speech: Dysarthric  Cranial nerves: R facial droop, tongue midline, CN II-XII grossly intact.   Eyes:  pupils equal, round, reactive to light with accomodation, EOMI.   Ears: No drainage.   Pulmonary: normal respirations, no signs of respiratory distress  Abdomen: soft, non-distended, not tender to palpation    Sensory: intact to light touch throughout  Motor Strength: LUE/LLE full strength and following commands. R-sided hemiparesis. No abnormal movements seen.     Clonus: present on R  Babinski: absent  Vascular: Pulses 2+ and symmetric radial and dorsalis pedis. No LE edema.   Skin: Skin is warm, dry and intact.      Significant Labs:  Recent Labs   Lab 05/14/20 0820   *      K 4.1   CL 99   CO2 27   BUN 11   CREATININE 0.70*   CALCIUM 9.2     Recent Labs   Lab 05/14/20 0820   WBC 9.50   HGB 15.0   HCT 46.1        Recent Labs   Lab 05/14/20 0820   LABPT 13.4   INR 1.0   APTT 26.0     Microbiology Results (last 7 days)     ** No results found for the last 168 hours. **        All pertinent labs from the last 24 hours have been reviewed.    Significant Diagnostics:  I have reviewed and interpreted all pertinent imaging results/findings within the past 24 hours.     [facility] 5/14 0813:  I have reviewed the imaging and agree with the report below.    There is amorphous, somewhat rounded hyperdensity in the region of the posterior limb of the left internal capsule measuring 1.9 x 2.1 cm in axial dimensions with surrounding subtle hypodense edema consistent with intraparenchymal hemorrhage.  There is associated intraventricular extension of the hemorrhage as evidenced by hyperdensity within the lateral ventricles left greater than right.  Hemorrhage also extends into the 3rd ventricle.  Ventricles are mildly prominent.  No midline shift.  Hemorrhage also present within the 4th ventricle and cerebral aqueduct extending into the upper spinal canal.  Nonspecific periventricular white matter change most likely related to sequela of chronic small vessel ischemia.     Assessment/Plan:     Nontraumatic  intracerebral hemorrhage   76 y/o M w/ no known PMH who presented with new onset RSW and dysarthria, found to have L caudate tail/thalamic ICH with IVH (ICH score 2):    --Patient admitted to [facility] on telemetry      -q1h neurochecks in ICU  --All labs and diagnostics reviewed  --CTH 5/14 shows L caudate/thalamic ICH with IVH in lateral ventricles (L>R), 3rd, and 4th ventricles. Mild prominence of ventricles but no overt hydrocephalus.  --Follow-up CTH 6h after initial for stability, ordered for 2pm today  --No EVD or other surgical intervention at this time, will follow exam closely for any deterioration and FU repeat scan.  --No anti-plt/coag medications  --HTN: SBP <140 (continue cardene ggt; hydralazine & labetalol PRN)  --Na >135  --HOB >30  --Full pre-op labs reviewed, coags WNL, T&S in process   --Covid test negative 5/14  --Keep NPO at this time for possible operative intervention  --Continue to monitor clinically, notify NSGY immediately with any changes in neuro status  --Please call with any questions or concerns    Dispo: TBD    Discussed with Dr. Cuevas        Thank you for your consult. I will follow-up with patient. Please contact us if you have any additional questions.    Kacey Moreno PA-C  Neurosurgery  [facility]

## 2020-05-14 NOTE — PLAN OF CARE
POC reviewed with pt and family at 1400. Pt verbalized understanding.  Transferred to [facility] today.  Arterial line placed.  CT scan completed.  NS @ 75.  Cardene titrated throughout shift. Questions and concerns addressed. No acute events today. Pt progressing toward goals. Will continue to monitor. See flowsheets for full assessment and VS info.

## 2020-05-14 NOTE — ASSESSMENT & PLAN NOTE
-Stroke risk factor, likely etiology of ICH in this pt  -Goal SBP < 140, consider starting po anti-hypertensives as needed to wean nicardipine

## 2020-05-14 NOTE — PLAN OF CARE
05/14/20 1517   Post-Acute Status   Post-Acute Authorization Placement   Post-Acute Placement Status Awaiting Therapy Documentation  (anticipated therapy recs 5/15)     Liberty Aragon LCSW  Neurocritical Care   [facility]  [id number]

## 2020-05-14 NOTE — HPI
Brett Vega is a 75 y.o. male with no known PMH and no contact with healthcare who presents as transfer [place] [facility] [place] new onset of R hemiparesis and dysarthria. Patient believes onset was around 3 am. Wife awoke this morning and found him unable to more R side with slurred speech so called EMS. BP on arrival to OSH was 247/129. He was started on a cardene gtt. CTH revealed L caudate/thalamic ICH w/ IVH. NSGY consulted for evaluation. Upon my exam, pt has GCS 15, AAOx3 and FC on L side, hemiparetic on R with significant dysarthria. He is legally blind at baseline, denies any acute visual changes. Denies any other complaints. He takes no blood thinners or any other medications.

## 2020-05-15 LAB
ALBUMIN SERPL BCP-MCNC: 3.6 G/DL (ref 3.5–5.2)
ALP SERPL-CCNC: 53 U/L (ref 55–135)
ALT SERPL W/O P-5'-P-CCNC: 9 U/L (ref 10–44)
ANION GAP SERPL CALC-SCNC: 6 MMOL/L (ref 8–16)
APTT BLDCRRT: 24.2 SEC (ref 21–32)
AST SERPL-CCNC: 18 U/L (ref 10–40)
AV INDEX (PROSTH): 0.82
AV MEAN GRADIENT: 5 MMHG
AV PEAK GRADIENT: 10 MMHG
AV VALVE AREA: 2.78 CM2
AV VELOCITY RATIO: 0.71
BASOPHILS # BLD AUTO: 0.02 K/UL (ref 0–0.2)
BASOPHILS NFR BLD: 0.2 % (ref 0–1.9)
BILIRUB SERPL-MCNC: 0.5 MG/DL (ref 0.1–1)
BSA FOR ECHO PROCEDURE: 2.17 M2
BUN SERPL-MCNC: 13 MG/DL (ref 8–23)
CALCIUM SERPL-MCNC: 8.1 MG/DL (ref 8.7–10.5)
CHLORIDE SERPL-SCNC: 106 MMOL/L (ref 95–110)
CK MB SERPL-MCNC: 4.5 NG/ML (ref 0.1–6.5)
CK MB SERPL-RTO: 3.9 % (ref 0–5)
CK SERPL-CCNC: 116 U/L (ref 20–200)
CO2 SERPL-SCNC: 26 MMOL/L (ref 23–29)
CREAT SERPL-MCNC: 0.8 MG/DL (ref 0.5–1.4)
CV ECHO LV RWT: 0.29 CM
DIFFERENTIAL METHOD: ABNORMAL
DOP CALC AO PEAK VEL: 1.58 M/S
DOP CALC AO VTI: 29.28 CM
DOP CALC LVOT AREA: 3.4 CM2
DOP CALC LVOT DIAMETER: 2.08 CM
DOP CALC LVOT PEAK VEL: 1.12 M/S
DOP CALC LVOT STROKE VOLUME: 81.48 CM3
DOP CALCLVOT PEAK VEL VTI: 23.99 CM
E/E' RATIO: 23.27 M/S
ECHO LV POSTERIOR WALL: 0.83 CM (ref 0.6–1.1)
EOSINOPHIL # BLD AUTO: 0 K/UL (ref 0–0.5)
EOSINOPHIL NFR BLD: 0.2 % (ref 0–8)
ERYTHROCYTE [DISTWIDTH] IN BLOOD BY AUTOMATED COUNT: 12.8 % (ref 11.5–14.5)
EST. GFR  (AFRICAN AMERICAN): >60 ML/MIN/1.73 M^2
EST. GFR  (NON AFRICAN AMERICAN): >60 ML/MIN/1.73 M^2
FRACTIONAL SHORTENING: 19 % (ref 28–44)
GLUCOSE SERPL-MCNC: 105 MG/DL (ref 70–110)
HCT VFR BLD AUTO: 42.5 % (ref 40–54)
HGB BLD-MCNC: 13.2 G/DL (ref 14–18)
IMM GRANULOCYTES # BLD AUTO: 0.02 K/UL (ref 0–0.04)
IMM GRANULOCYTES NFR BLD AUTO: 0.2 % (ref 0–0.5)
INR PPP: 1.1 (ref 0.8–1.2)
INTERVENTRICULAR SEPTUM: 0.92 CM (ref 0.6–1.1)
LA MAJOR: 4.47 CM
LA MINOR: 5.32 CM
LA WIDTH: 3.11 CM
LEFT ATRIUM SIZE: 2.66 CM
LEFT ATRIUM VOLUME INDEX: 16 ML/M2
LEFT ATRIUM VOLUME: 34.16 CM3
LEFT INTERNAL DIMENSION IN SYSTOLE: 4.63 CM (ref 2.1–4)
LEFT VENTRICLE DIASTOLIC VOLUME INDEX: 75.86 ML/M2
LEFT VENTRICLE DIASTOLIC VOLUME: 161.67 ML
LEFT VENTRICLE MASS INDEX: 90 G/M2
LEFT VENTRICLE SYSTOLIC VOLUME INDEX: 46.5 ML/M2
LEFT VENTRICLE SYSTOLIC VOLUME: 99.08 ML
LEFT VENTRICULAR INTERNAL DIMENSION IN DIASTOLE: 5.73 CM (ref 3.5–6)
LEFT VENTRICULAR MASS: 192.27 G
LV LATERAL E/E' RATIO: 18.29 M/S
LV SEPTAL E/E' RATIO: 32 M/S
LYMPHOCYTES # BLD AUTO: 1.3 K/UL (ref 1–4.8)
LYMPHOCYTES NFR BLD: 14.5 % (ref 18–48)
MAGNESIUM SERPL-MCNC: 1.9 MG/DL (ref 1.6–2.6)
MCH RBC QN AUTO: 28.8 PG (ref 27–31)
MCHC RBC AUTO-ENTMCNC: 31.1 G/DL (ref 32–36)
MCV RBC AUTO: 93 FL (ref 82–98)
MONOCYTES # BLD AUTO: 0.7 K/UL (ref 0.3–1)
MONOCYTES NFR BLD: 8.5 % (ref 4–15)
MV PEAK E VEL: 1.28 M/S
NEUTROPHILS # BLD AUTO: 6.6 K/UL (ref 1.8–7.7)
NEUTROPHILS NFR BLD: 76.4 % (ref 38–73)
NRBC BLD-RTO: 0 /100 WBC
PHOSPHATE SERPL-MCNC: 2.6 MG/DL (ref 2.7–4.5)
PLATELET # BLD AUTO: 205 K/UL (ref 150–350)
PMV BLD AUTO: 9.4 FL (ref 9.2–12.9)
POTASSIUM SERPL-SCNC: 4 MMOL/L (ref 3.5–5.1)
PROT SERPL-MCNC: 6.7 G/DL (ref 6–8.4)
PROTHROMBIN TIME: 10.9 SEC (ref 9–12.5)
RA PRESSURE: 3 MMHG
RBC # BLD AUTO: 4.58 M/UL (ref 4.6–6.2)
RV TISSUE DOPPLER FREE WALL SYSTOLIC VELOCITY 1 (APICAL 4 CHAMBER VIEW): 17.03 CM/S
SODIUM SERPL-SCNC: 138 MMOL/L (ref 136–145)
TDI LATERAL: 0.07 M/S
TDI SEPTAL: 0.04 M/S
TDI: 0.06 M/S
TROPONIN I SERPL DL<=0.01 NG/ML-MCNC: 0.4 NG/ML (ref 0–0.03)
TROPONIN I SERPL DL<=0.01 NG/ML-MCNC: 0.53 NG/ML (ref 0–0.03)
WBC # BLD AUTO: 8.7 K/UL (ref 3.9–12.7)

## 2020-05-15 PROCEDURE — 92523 SPEECH SOUND LANG COMPREHEN: CPT

## 2020-05-15 PROCEDURE — 82553 CREATINE MB FRACTION: CPT

## 2020-05-15 PROCEDURE — 25000003 PHARM REV CODE 250: Performed by: NURSE PRACTITIONER

## 2020-05-15 PROCEDURE — 82550 ASSAY OF CK (CPK): CPT

## 2020-05-15 PROCEDURE — 80053 COMPREHEN METABOLIC PANEL: CPT

## 2020-05-15 PROCEDURE — 25000003 PHARM REV CODE 250: Performed by: STUDENT IN AN ORGANIZED HEALTH CARE EDUCATION/TRAINING PROGRAM

## 2020-05-15 PROCEDURE — 99233 SBSQ HOSP IP/OBS HIGH 50: CPT | Mod: GC,,, | Performed by: PSYCHIATRY & NEUROLOGY

## 2020-05-15 PROCEDURE — 84100 ASSAY OF PHOSPHORUS: CPT

## 2020-05-15 PROCEDURE — 99232 PR SUBSEQUENT HOSPITAL CARE,LEVL II: ICD-10-PCS | Mod: GC,,, | Performed by: NEUROLOGICAL SURGERY

## 2020-05-15 PROCEDURE — 85025 COMPLETE CBC W/AUTO DIFF WBC: CPT

## 2020-05-15 PROCEDURE — 99232 SBSQ HOSP IP/OBS MODERATE 35: CPT | Mod: GC,,, | Performed by: NEUROLOGICAL SURGERY

## 2020-05-15 PROCEDURE — 84484 ASSAY OF TROPONIN QUANT: CPT

## 2020-05-15 PROCEDURE — 20000000 HC ICU ROOM

## 2020-05-15 PROCEDURE — 43752 NASAL/OROGASTRIC W/TUBE PLMT: CPT

## 2020-05-15 PROCEDURE — 94761 N-INVAS EAR/PLS OXIMETRY MLT: CPT

## 2020-05-15 PROCEDURE — 99291 CRITICAL CARE FIRST HOUR: CPT | Mod: ,,, | Performed by: NURSE PRACTITIONER

## 2020-05-15 PROCEDURE — 99233 PR SUBSEQUENT HOSPITAL CARE,LEVL III: ICD-10-PCS | Mod: GC,,, | Performed by: PSYCHIATRY & NEUROLOGY

## 2020-05-15 PROCEDURE — 84484 ASSAY OF TROPONIN QUANT: CPT | Mod: 91

## 2020-05-15 PROCEDURE — 85610 PROTHROMBIN TIME: CPT

## 2020-05-15 PROCEDURE — 83735 ASSAY OF MAGNESIUM: CPT

## 2020-05-15 PROCEDURE — 27000221 HC OXYGEN, UP TO 24 HOURS

## 2020-05-15 PROCEDURE — 97802 MEDICAL NUTRITION INDIV IN: CPT

## 2020-05-15 PROCEDURE — 97162 PT EVAL MOD COMPLEX 30 MIN: CPT

## 2020-05-15 PROCEDURE — 97166 OT EVAL MOD COMPLEX 45 MIN: CPT

## 2020-05-15 PROCEDURE — 99291 PR CRITICAL CARE, E/M 30-74 MINUTES: ICD-10-PCS | Mod: ,,, | Performed by: NURSE PRACTITIONER

## 2020-05-15 PROCEDURE — 85730 THROMBOPLASTIN TIME PARTIAL: CPT

## 2020-05-15 PROCEDURE — 92526 ORAL FUNCTION THERAPY: CPT

## 2020-05-15 PROCEDURE — 63600175 PHARM REV CODE 636 W HCPCS: Performed by: PSYCHIATRY & NEUROLOGY

## 2020-05-15 RX ORDER — AMOXICILLIN 250 MG
1 CAPSULE ORAL DAILY
Status: DISCONTINUED | OUTPATIENT
Start: 2020-05-15 | End: 2020-05-16

## 2020-05-15 RX ORDER — ACETAMINOPHEN 325 MG/1
650 TABLET ORAL EVERY 4 HOURS PRN
Status: DISCONTINUED | OUTPATIENT
Start: 2020-05-15 | End: 2020-05-16

## 2020-05-15 RX ORDER — HYDRALAZINE HYDROCHLORIDE 25 MG/1
25 TABLET, FILM COATED ORAL EVERY 8 HOURS
Status: DISCONTINUED | OUTPATIENT
Start: 2020-05-15 | End: 2020-05-16

## 2020-05-15 RX ORDER — LABETALOL HCL 20 MG/4 ML
10 SYRINGE (ML) INTRAVENOUS EVERY 4 HOURS PRN
Status: DISCONTINUED | OUTPATIENT
Start: 2020-05-15 | End: 2020-05-26

## 2020-05-15 RX ORDER — NICARDIPINE HYDROCHLORIDE 0.2 MG/ML
2.5 INJECTION INTRAVENOUS CONTINUOUS
Status: DISCONTINUED | OUTPATIENT
Start: 2020-05-15 | End: 2020-05-17

## 2020-05-15 RX ORDER — HYDRALAZINE HYDROCHLORIDE 20 MG/ML
10 INJECTION INTRAMUSCULAR; INTRAVENOUS EVERY 4 HOURS PRN
Status: DISCONTINUED | OUTPATIENT
Start: 2020-05-15 | End: 2020-05-26

## 2020-05-15 RX ADMIN — HYDRALAZINE HYDROCHLORIDE 25 MG: 25 TABLET, FILM COATED ORAL at 10:05

## 2020-05-15 RX ADMIN — HUMAN ALBUMIN MICROSPHERES AND PERFLUTREN 0.66 MG: 10; .22 INJECTION, SOLUTION INTRAVENOUS at 03:05

## 2020-05-15 RX ADMIN — NICARDIPINE HYDROCHLORIDE 12.5 MG/HR: 0.2 INJECTION, SOLUTION INTRAVENOUS at 06:05

## 2020-05-15 RX ADMIN — SENNOSIDES AND DOCUSATE SODIUM 1 TABLET: 8.6; 5 TABLET ORAL at 10:05

## 2020-05-15 RX ADMIN — SODIUM CHLORIDE: 0.9 INJECTION, SOLUTION INTRAVENOUS at 06:05

## 2020-05-15 RX ADMIN — HYDRALAZINE HYDROCHLORIDE 25 MG: 25 TABLET, FILM COATED ORAL at 02:05

## 2020-05-15 RX ADMIN — HYDRALAZINE HYDROCHLORIDE 25 MG: 25 TABLET, FILM COATED ORAL at 09:05

## 2020-05-15 RX ADMIN — NICARDIPINE HYDROCHLORIDE 10 MG/HR: 0.2 INJECTION, SOLUTION INTRAVENOUS at 02:05

## 2020-05-15 RX ADMIN — NICARDIPINE HYDROCHLORIDE 10 MG/HR: 0.2 INJECTION, SOLUTION INTRAVENOUS at 10:05

## 2020-05-15 NOTE — PLAN OF CARE
POC reviewed with pt at 0500. Pt verbalized understanding. Cardene continued. CTH complete. NC 3L. A Line in place. Questions and concerns addressed. No acute events overnight. Pt progressing toward goals. Will continue to monitor. See flowsheets for full assessment and VS info.

## 2020-05-15 NOTE — PLAN OF CARE
05/15/20 1441   Post-Acute Status   Post-Acute Authorization Placement   Post-Acute Placement Status Referrals Sent  ([facility])     SW observed therapy recs for rehab. Per CM assessment note, Pt wife would like [facility] if that is what is needed. AURELIO sent referral via .    Liberty Aragon LCSW  Neurocritical Care   [facility]  [id number]

## 2020-05-15 NOTE — ASSESSMENT & PLAN NOTE
-BP on arrival to outside facility 247/129, required Cardene ggt  -SBP goal <160,  Wean cardene gtt  -add hydralazine 25 mg q6h  -hydralazine and labetalol available

## 2020-05-15 NOTE — PLAN OF CARE
Problem: Physical Therapy Goal  Goal: Physical Therapy Goal  Description  PT goals until 5/28/20    1. Pt supine to sit with minimal assist-not met  2. Pt sit to supine with minimal assist-not met  3. Pt sit to stand with hemiwalker with minimal assist-not met  4. Pt to perform gait 5ft with hemiwalker with max assist.-not met  5. Pt to transfer bed to/from bedside chair with hemiwalker with moderate assist.-not met  6. Pt to perform B LE exs in sitting or supine x 15 reps to strengthen B LE to improve functional mobility.-not met     Outcome: Ongoing, Progressing   Pt's goals set and pt will benefit from skilled PT services to work towards improved functional mobility including: bed mobility, transfers, and gait.   Marissa Guajardo, PT  5/15/2020

## 2020-05-15 NOTE — HOSPITAL COURSE
05/15/2020: SBP < 160, add hydralazine, PT/OT, evd watch  5/16/2020: increase Hydralazine, add losartan, start tube feeds, follow up CTH in AM, add silodosin and place cordova catheter, replace electrolytes PRN  5/17/2020: CT head stable, advance TF, add labetalol 100 q8hr, d/c cardene gtt  5/18/2020: required one prn dose of labetalol at 3 AM, scheduled hydralizine changed to q6 hrs, trial of puree consistency today although I do not feel patients swallowing is at point where can eat unattended or large quantities of food  5/19/20 Losartan increased to 100, hydralazine increased to 75. Resuming TF. Modified barium swallow test ordered. Nebs q6hr PRN. Transferred to Stroke Floor.

## 2020-05-15 NOTE — HOSPITAL COURSE
5/15: MIRANDA HENLEY, Exam stable from yesterday, CTH stable, no surgical intervention at this time  5/18: MIRANDA HENLEY, Exam remains stable, no surgical intervention

## 2020-05-15 NOTE — CARE UPDATE
Called patient's wife Unique Vega ([phone]) at 15:30 and updated her with today's plan of care. She verbalized understanding. All questions answered.

## 2020-05-15 NOTE — PT/OT/SLP EVAL
Speech Language Pathology Evaluation  Cognitive Communication    Patient Name:  Brett Vega   MRN:  [id number]  Admitting Diagnosis: <principal problem not specified>    Recommendations:     Recommendations:                General Recommendations:  Dysphagia therapy, Speech/language therapy and Cognitive-linguistic therapy  Diet recommendations:  NPO, NPO   Aspiration Precautions: Meds crushed in puree and Strict aspiration precautions   General Precautions: Standard, aspiration, fall  Communication strategies:  Speech strategies    History:     History reviewed. No pertinent past medical history.    History reviewed. No pertinent surgical history.    Social History: Patient lives with spouse. He is a retired       Prior diet: Regular/thin.    Subjective     Awake/alert    Pain/Comfort:  · Pain Rating 1: 0/10  · Pain Rating Post-Intervention 1: 0/10    Objective:   Cognitive Status:    Orientation Oriented x4  Problem Solving Categories 60%, Sequencing 4/4 word set provided repetition x1 and Compare/contrast able to compare ind'ly, cues required to contrast      Receptive Language:   Comprehension:   Questions Complex yes/no 100%  Commands  two step basic commands 100%  complex/abstract commands 0/2    Expressive Language:  Verbal:    Verbal language skills were wfl with no evidence of aphasia.  Pt. Expressed their thoughts coherently in conversation with no evidence of confusion or word finding deficits  Nonverbal:   WFL      Motor Speech:  Dysarthria moderate. SLP educated speech strategies.     Voice:   WFL    Visual-Spatial:  TBA    Reading:   TBA     Written Expression:   TBA    Treatment: Pt repositioned upright in bed for PO trials. He tolerated puree x4 with timely swallow initiation and no overt clinical signs of airway compromise. Thin via cup x2 and nectar thick liquids x3 tolerated with coughing noted post swallow x3 during trials. Anterior loss noted with all liquids. Recommend NPO diet at  this time with meds crushed in puree at this time.     Assessment:   Brett Vega is a 75 y.o. male with an SLP diagnosis of Dysphagia, Dysarthria and Cognitive-Linguistic Impairment.     Goals:   Multidisciplinary Problems     SLP Goals        Problem: SLP Goal    Goal Priority Disciplines Outcome   SLP Goal     SLP Ongoing, Progressing   Description:  Speech Language Pathology Goals  Goals expected to be met by 5/21    1. Pt will participate in ongoing swallow assessment  2. Pt will utilize speech strategies with min cues  3. Pt will follow complex commands with 50% accy and min cues  4. Pt will complete problem solving task with 80% accy and min cues                            Plan:   · Patient to be seen:  5 x/week   · Plan of Care reviewed with:  patient   · SLP Follow-Up:  Yes       Discharge recommendations:    TBD      Time Tracking:   SLP Treatment Date:   05/15/20  Speech Start Time:  0811  Speech Stop Time:  0826     Speech Total Time (min):  15 min    Billable Minutes: Eval 8  and Treatment Swallowing Dysfunction 7    Johnna Duran CCC-SLP  05/15/2020

## 2020-05-15 NOTE — SUBJECTIVE & OBJECTIVE
Interval History: 5/15: NAEON, AFVSS, Exam stable from yesterday, CTH stable, no surgical intervention at this time    Medications:  Continuous Infusions:   sodium chloride 0.9% 75 mL/hr at 05/14/20 1805    niCARdipine 12.5 mg/hr (05/15/20 0610)     Scheduled Meds:  PRN Meds:sodium chloride 0.9%     Review of Systems  Objective:     Weight: 95.4 kg (210 lb 5.1 oz)  Body mass index is 30.18 kg/m².  Vital Signs (Most Recent):  Temp: 99.1 °F (37.3 °C) (05/14/20 2300)  Pulse: 71 (05/15/20 0400)  Resp: 20 (05/15/20 0400)  BP: 114/67 (05/15/20 0400)  SpO2: 97 % (05/15/20 0400) Vital Signs (24h Range):  Temp:  [97.8 °F (36.6 °C)-100 °F (37.8 °C)] 99.1 °F (37.3 °C)  Pulse:  [] 71  Resp:  [18-27] 20  SpO2:  [90 %-97 %] 97 %  BP: (108-181)/(59-96) 114/67  Arterial Line BP: (108-143)/(55-71) 110/55                Oxygen Concentration (%):  [32] 32    Male External Urinary Catheter 05/14/20 1146 Medium (Active)   Collection Container Urimeter 5/15/2020  3:00 AM   Securement Method secured to top of thigh w/ adhesive device 5/15/2020  3:00 AM   Skin no redness;no breakdown 5/15/2020  3:00 AM   Tolerance no signs/symptoms of discomfort 5/15/2020  3:00 AM   Output (mL) 0 mL 5/15/2020  4:00 AM   Catheter Change Date 05/14/20 5/15/2020  3:00 AM   Catheter Change Time 1105 5/15/2020  3:00 AM       Neurosurgery Physical Exam  [id number], AOx3, mild confusion, dysarthric;  CNi except R facial droop, PERRL;  FC LUE/LLE 5/5, Paretic on RUE/RLE    Significant Labs:  Recent Labs   Lab 05/14/20  0820 05/15/20  0153   * 105    138   K 4.1 4.0   CL 99 106   CO2 27 26   BUN 11 13   CREATININE 0.70* 0.8   CALCIUM 9.2 8.1*   MG  --  1.9     Recent Labs   Lab 05/14/20  0820 05/15/20  0153   WBC 9.50 8.70   HGB 15.0 13.2*   HCT 46.1 42.5    205     Recent Labs   Lab 05/14/20  0820 05/15/20  0153   LABPT 13.4  --    INR 1.0 1.1   APTT 26.0 24.2     Microbiology Results (last 7 days)     ** No results found for the last 168  hours. **        ABGs: No results for input(s): PH, PCO2, PO2, HCO3, POCSATURATED, BE in the last 48 hours.  Cardiac markers:   Recent Labs   Lab 05/15/20  0153   CPKMB 4.5   TROPONINI 0.527*     CMP:   Recent Labs   Lab 05/14/20  0820 05/15/20  0153   * 105   CALCIUM 9.2 8.1*   ALBUMIN 4.8 3.6   PROT 8.6* 6.7    138   K 4.1 4.0   CO2 27 26   CL 99 106   BUN 11 13   CREATININE 0.70* 0.8   ALKPHOS 68 53*   ALT 17 9*   AST 31 18   BILITOT 0.7 0.5     CRP: No results for input(s): CRP in the last 48 hours.  ESR: No results for input(s): POCESR, ERYTHROCYTES in the last 48 hours.  LFTs:   Recent Labs   Lab 05/14/20  0820 05/15/20  0153   ALT 17 9*   AST 31 18   ALKPHOS 68 53*   BILITOT 0.7 0.5   PROT 8.6* 6.7   ALBUMIN 4.8 3.6     Procalcitonin: No results for input(s): PROCAL in the last 48 hours.    Significant Diagnostics:  I have reviewed all pertinent imaging results/findings within the past 24 hours.

## 2020-05-15 NOTE — PLAN OF CARE
Problem: Eating/Swallowing Impairment (Stroke, Hemorrhagic)  Goal: Oral Intake without Aspiration  Outcome: Ongoing, Progressing  Intervention: Optimize Eating and Swallowing  Flowsheets (Taken 5/15/2020 1240)  Nutrition Support Management: weight trending reviewed; other (see comments)     Recommendations     1.) ADAT to regular; texture per SLP.   2.) Initiate enteral nutrition within 48hr: suggest Impact Peptide @ 20mL/hr advancing 10mL q4h to goal rate 50mL/hr to provide 1800 kcals, 113gm protein and 924mL of free water.               MD to manage fluid.               If GRV >500mL, hold TF q4h then restart regimen.               TF to meet 106% EEN and 100% EPN.   3.) Suggest MVI.   4.) Daily weights     Goals: 1.) Pt to return to nutritionally adequate diet by follow up.   Nutrition Goal Status: new  Communication of GRETEL K[place]: reviewed with RN

## 2020-05-15 NOTE — PLAN OF CARE
CT head stable  Place NG tube   Resume oral antihypertensives -  Hydralazine 25mg q 8hrly  Wean off nicardipine to IV PRN antihypertensives.   SBP goal < 160 mmhg  PT/OT  Mobilize as tolerated  Echo pending

## 2020-05-15 NOTE — ASSESSMENT & PLAN NOTE
-New onset of RSW and dysarthria in setting of /129 on presentation, no meds at home.  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   -Telemedicine-stroke done by Dr Looney  -Admitted to [facility]  -q1hr neurocheck  -SBP goal <160 On Cardene ggt, currently at goal  -Vascular neurology and STAT NSGY   -PT/INR, PTT   05/15/2020: EVD watch, sbp < 160, PT/OT

## 2020-05-15 NOTE — PROGRESS NOTES
[facility]  Vascular Neurology  Comprehensive Stroke Center  Progress Note    Assessment/Plan:     Dysarthria  -2/2 ICH, continue SLP eval and treat    Hemiparesis, right  -2/2 ICH, continue PT eval and treat    Essential hypertension  -Stroke risk factor, likely etiology of ICH in this pt  -Goal SBP < 140, consider starting po anti-hypertensives as needed to wean nicardipine    Nontraumatic intracerebral hemorrhage  76 yo M with no known PMHx presents to [facility] 05/14/2020 transferred from OSH for large L thalamic and caudate ICH with intraventricular extension.  Of note, patient had SBP 240s documented at OSH.  Pt otherwise has no clear risk factors for ICH.  Does not appear to have cognitive deficits on exam, no reported concern for memory issues.  No medications, no EtOH use.  No recent trauma.  No unexplained weight loss or B symptoms.  Admitted to Neuro ICU w/ NSGY following.  Etiology likely hypertensive  -still on cardene  - continue to monitor in [facility]    Antithrombotics for secondary stroke prevention:  None--ICH  Statins for secondary stroke prevention/HLD: LDL elevated--would not start statin acutely in setting of ICH, but could discuss low dose statin for reduction of stroke risk factors in stroke clinic follow up  Aggressive risk factor modification: HTN  Rehab efforts: PT/OT/SLP/PM&R  Diagnostics ordered/pending: CTA head to eval for vascular malformation  VTE prophylaxis: None: Reason for No Pharmacological VTE Prophylaxis: History of systemic or intracranial bleeding  BP parameters: ICH: SBP <140       05/14/2020:  Transferred to [facility] from [facility] for L ICH/IVH from caudate and thalamus  05/15/2020:   Continues on cardene for SBP < 140, neurosurgery following.      STROKE DOCUMENTATION   Acute Stroke Times   Last Known Normal Time: 0300  Symptom Onset Date: 05/14/20  Symptom Onset Time: 0700  Stroke Team Called Time: 0806  Stroke Team Arrival Time: 0809  CT Interpretation  Time: 0809  Decision to Treat Time for Alteplase: (Contraindicated 2/2 ICH)  Decision to Treat Time for IR: (no LVO)      NIH Scale:  Interval: baseline  1a. Level of Consciousness: 1-->Not alert, but arousable by minor stimulation to obey, answer, or respond  1b. LOC Questions: 1-->Answers one question correctly  1c. LOC Commands: 0-->Performs both tasks correctly  2. Best Gaze: 0-->Normal  3. Visual: 1-->Partial hemianopia(Pt notes baseline poor vision in R eye on testing)  4. Facial Palsy: 1-->Minor paralysis (flattened nasolabial fold, asymmetry on smiling)  5a. Motor Arm, Left: 0-->No drift, limb holds 90 (or 45) degrees for full 10 secs  5b. Motor Arm, Right: 3-->No effort against gravity, limb falls  6a. Motor Leg, Left: 0-->No drift, leg holds 30 degree position for full 5 secs  6b. Motor Leg, Right: 3-->No effort against gravity, leg falls to bed immediately  7. Limb Ataxia: 0-->Absent  8. Sensory: 1-->Mild-to-moderate sensory loss, patient feels pinprick is less sharp or is dull on the affected side, or there is a loss of superficial pain with pinprick, but patient is aware of being touched  9. Best Language: 0-->No aphasia, normal  10. Dysarthria: 2-->Severe dysarthria, patients speech is so slurred as to be unintelligible in the absence of or out of proportion to any dysphasia, or is mute/anarthric  11. Extinction and Inattention (formerly Neglect): 0-->No abnormality  Total (NIH Stroke Scale): 13      Modified Kina Score: 0  Tammy Coma Scale:    ABCD2 Score:    [id number] Score:   HAS -BLED Score:   ICH Score:1  Hunt & Mccray Classification:      Hemorrhagic change of an Ischemic Stroke: Does this patient have an ischemic stroke with hemorrhagic changes? No     Neurologic Chief Complaint: L caudate/ thalamic ICH w/ IVH    Subjective:     Interval History: Patient is seen for follow-up neurological assessment and treatment recommendations: LORY. Currently on cardene for SBP < 140. Hydralazine 25mg  q8hrs added to wean off of cardene.    HPI, Past Medical, Family, and Social History remains the same as documented in the initial encounter.     Review of Systems   Constitutional: Negative for chills and fever.   HENT: Negative for congestion and trouble swallowing.    Eyes: Negative for pain and visual disturbance.   Respiratory: Negative for cough and shortness of breath.    Cardiovascular: Negative for chest pain and palpitations.   Gastrointestinal: Negative for abdominal pain, constipation, diarrhea, nausea and vomiting.   Genitourinary: Negative for dysuria and flank pain.   Musculoskeletal: Negative for myalgias.   Neurological: Positive for speech difficulty and weakness. Negative for numbness and headaches.   Psychiatric/Behavioral: The patient is not nervous/anxious.      Scheduled Meds:   hydrALAZINE  25 mg Oral Q8H    senna-docusate 8.6-50 mg  1 tablet Oral Daily     Continuous Infusions:   sodium chloride 0.9% 75 mL/hr at 05/15/20 0805    niCARdipine 7.5 mg/hr (05/15/20 1025)     PRN Meds:acetaminophen, hydrALAZINE, labetaloL, sodium chloride 0.9%    Objective:     Vital Signs (Most Recent):  Temp: 97.6 °F (36.4 °C) (05/15/20 0705)  Pulse: 80 (05/15/20 0905)  Resp: 20 (05/15/20 0905)  BP: 117/65 (05/15/20 0905)  SpO2: 97 % (05/15/20 0905)  BP Location: Right arm    Vital Signs Range (Last 24H):  Temp:  [97.6 °F (36.4 °C)-100 °F (37.8 °C)]   Pulse:  []   Resp:  [18-39]   BP: (112-153)/(60-86)   SpO2:  [94 %-97 %]   Arterial Line BP: (108-143)/(55-71)   BP Location: Right arm    Physical Exam   Constitutional: He is oriented to person, place, and time. No distress.   Cardiovascular: Normal rate and regular rhythm.   Pulmonary/Chest: Effort normal.   Abdominal: Soft.   Neurological: He is alert and oriented to person, place, and time.   Skin: Skin is warm. He is not diaphoretic.       Neurological Exam:   LOC: alert  Attention Span: Good   Language: No aphasia  Articulation: Dysarthria  Motor:  Arm left  Normal 5/5  Leg left  Normal 5/5  Arm right  Plegia 0/5  Leg right Plegia 0/5  Cebellar: No evidence of appendicular or axial ataxia  Sensation: Intact to light touch, temperature and vibration    Laboratory:  CMP:   Recent Labs   Lab 05/15/20  0153   CALCIUM 8.1*   ALBUMIN 3.6   PROT 6.7      K 4.0   CO2 26      BUN 13   CREATININE 0.8   ALKPHOS 53*   ALT 9*   AST 18   BILITOT 0.5     CBC:   Recent Labs   Lab 05/15/20  0153   WBC 8.70   RBC 4.58*   HGB 13.2*   HCT 42.5      MCV 93   MCH 28.8   MCHC 31.1*       Diagnostic Results     Brain imagin2020 CT head w/o contrast:      Redemonstration of an acute parenchymal hemorrhage centered within the left basal ganglia with intraventricular extension.  Persistent ventricular prominence, stable in size when compared to prior study, in keeping with evolving hydrocephalus.      Vessel Imaging:  Pending     Cardiac Evaluation:   None       Nani Aparicio MD  Comprehensive Stroke Center  Department of Vascular Neurology   [facility]

## 2020-05-15 NOTE — PLAN OF CARE
POC reviewed with pt and family at 1100. Pt and family verbalized understanding. Questions and concerns addressed. L NGT placed this shift. Echo completed. Cardene drip turned off at 1100, maintaining S < 160. Bladder scan and straight cath Q6 hours continued. NC 3L. NS continued. No acute events today. Pt progressing toward goals. Will continue to monitor. See flowsheets for full assessment and VS info.

## 2020-05-15 NOTE — PT/OT/SLP EVAL
Occupational Therapy   Evaluation    Name: Brett Vega  MRN: [id number]  Admitting Diagnosis:  <principal problem not specified>      Recommendations:     Discharge Recommendations: rehabilitation facility  Discharge Equipment Recommendations:  (TBD as pt progresses )  Barriers to discharge:  Other (Comment)(Pt requires increased assistance at current functional level)    Assessment:     Brett Vega is a 75 y.o. male with a medical diagnosis of <principal problem not specified>.  He presents with performance deficits affecting function: impaired endurance, weakness, impaired sensation, impaired self care skills, impaired functional mobilty, gait instability, impaired balance, visual deficits, decreased upper extremity function, decreased lower extremity function, decreased coordination, decreased safety awareness, impaired cardiopulmonary response to activity, impaired fine motor, impaired coordination.  Pt tolerated session well and answered simple questions appropriately. Pt with increased R lateral lean while sitting EOB requiring max A <> total A for sitting balance. Pt with R UE hemiparesis and overall impaired coordination. Pt would benefit from continued skilled acute OT services in order to maximize independence and safety with ADLs and functional mobility to ensure safe return to PLOF in the least restrictive environment. OT recommending IP Rehab.     Rehab Prognosis: Good; patient would benefit from acute skilled OT services to address these deficits and reach maximum level of function.       Plan:     Patient to be seen 4 x/week to address the above listed problems via self-care/home management, therapeutic activities, therapeutic exercises, neuromuscular re-education  · Plan of Care Expires: 06/13/20  · Plan of Care Reviewed with: patient    Subjective     Chief Complaint: limited functional mobility   Patient/Family Comments/goals: to get better and return home     Occupational Profile:  Living  Environment: Pt lives with his wife in a H with no JO. Pt has both a tub/shower combo and walk-in shower with no DME present. Pt reports he was driving and is retired   Previous level of function: PTA, pt was (I) with ADLs and functional mobility  Roles and Routines: home dweller   Equipment Used at Home:  none  Assistance upon Discharge: Pt will have assistance from wife upon d/c.     Pain/Comfort:  · Pain Rating 1: 0/10  · Pain Rating Post-Intervention 1: 0/10    Patients cultural, spiritual, Mandaen conflicts given the current situation: no    Objective:     Communicated with: RN prior to session.  Patient found HOB elevated with arterial line, blood pressure cuff, telemetry, pulse ox (continuous), peripheral IV, Condom Catheter, SCD upon OT entry to room. Pt agreeable to therapy session. Co-eval with PT    General Precautions: Standard, aspiration, fall   Orthopedic Precautions:N/A   Braces: N/A     Occupational Performance:    Bed Mobility:    · Patient completed Rolling/Turning to Right with maximal assistance  · Patient completed Scooting/Bridging with total assistance and 2 persons  · Patient completed Supine to Sit with total assistance and 2 persons  · Patient completed Sit to Supine with total assistance and 2 persons    Functional Mobility/Transfers:  · Sit <> stand: moderate assistance x 2 persons     Activities of Daily Living:  · Lower Body Dressing: total assistance donning socks     Cognitive/Visual Perceptual:  Cognitive/Psychosocial Skills:     -       Oriented to: Person, Place, Time and Situation   -       Follows Commands/attention:Follows two-step commands  -       Communication: dysarthria  -       Memory: No Deficits noted  -       Safety awareness/insight to disability: impaired   -       Mood/Affect/Coping skills/emotional control: Appropriate to situation and Cooperative  Visual/Perceptual:      -per pt's chart, legally blind       Physical Exam:  Balance:    - Static sit: total A  with R lateral lean and pushing with L UE; pt tolerated sitting EOB for 8 mins   -  Dynamic sit: total A   - Static standing: mod A x 2 person    Postural examination/scapula alignment:    -       Rounded shoulders  -       Forward head  -       Lateral weight shift of hips  Skin integrity: Visible skin intact  Edema:  Mild B UEs  Sensation:    -       Intact  light/touch R UE - pt reported slight numbness   Dominant hand:    -       right  Upper Extremity Range of Motion:     -       Right Upper Extremity: Deficits: WFL for PROM, slight tone noted for external rotation and shoulder flexion   -       Left Upper Extremity: WFL  Upper Extremity Strength:    -       Right Upper Extremity: Deficits: 1/5 shoulder and tricpes, 0/5 for biceps, wrists, and digits   -       Left Upper Extremity: 4/5    Strength:    -       Right Upper Extremity: 0/5   -       Left Upper Extremity: 4/5     AMPAC 6 Click ADL:  AMPAC Total Score: 7    Treatment & Education:  - Pt educated on role of OT, POC, and goals for therapy.    - RN educated on elevating R UE in order to maintain joint integrity   - Time provided for therapeutic counseling and discussion of health disposition.   - Pt completed ADLs and functional mobility for treatment session as noted above   - Pt verbalized understanding. Pt expressed no further concerns/questions.  - whiteboard updated     Education:    Patient left HOB elevated with all lines intact, call button in reach and RN notified    GOALS:   Multidisciplinary Problems     Occupational Therapy Goals        Problem: Occupational Therapy Goal    Goal Priority Disciplines Outcome Interventions   Occupational Therapy Goal     OT, PT/OT Ongoing, Progressing    Description:  Goals to be met by: 5/29/2020     Patient will increase functional independence with ADLs by performing:    Grooming while EOB with Minimal Assistance.  Toileting from bedside commode with Moderate Assistance for hygiene and clothing  management.   Sitting at edge of bed x10 minutes with Minimal Assistance in preparation of functional seated grooming tasks   Stand pivot transfers with Moderate Assistance.  Toilet transfer to bedside commode with Moderate Assistance.                      History:     History reviewed. No pertinent past medical history.    History reviewed. No pertinent surgical history.    Time Tracking:     OT Date of Treatment: 05/15/20  OT Start Time: 0925  OT Stop Time: 0942  OT Total Time (min): 17 min    Billable Minutes:Evaluation 15 (co-eval with PT)    Taylor Iyer, OT  5/15/2020

## 2020-05-15 NOTE — PROGRESS NOTES
[facility]  Neurocritical Care  Progress Note    Admit Date: 5/14/2020  Service Date: 05/15/2020  Length of Stay: 1    Subjective:     Chief Complaint: <principal problem not specified>    History of Present Illness: 76 yo male with no PMHX and no contact with health care his entire adult life transferred from [facility] after he presented there for new onset of dense right hemiparesis and marked dysarthria. Onset unclear but likely around 3am this morning. No recent illness. Legally blind. BP on arrival to outside facility 247/129. On Cardene ggt. 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 Telemedicine-stroke done by Dr Looney.  Currently pt awake, alert, oriented, following commands. No movement against gravity on the right side. On Cardene ggt. STAT consult to NSGY. Awaiting recs.     Hospital Course: 05/15/2020: SBP < 160, add hydralazine, PT/OT, evd watch        Review of Systems  Unable to obtain a complete ROS due to level of consciousness.  Objective:     Vitals:  Temp: 97.6 °F (36.4 °C)  Pulse: 80  Rhythm: normal sinus rhythm  BP: 117/65  MAP (mmHg): 85  Resp: 20  SpO2: 97 %  O2 Device (Oxygen Therapy): nasal cannula    Temp  Min: 97.6 °F (36.4 °C)  Max: 100 °F (37.8 °C)  Pulse  Min: 71  Max: 116  BP  Min: 112/86  Max: 153/85  MAP (mmHg)  Min: 77  Max: 112  Resp  Min: 18  Max: 39  SpO2  Min: 94 %  Max: 97 %  Oxygen Concentration (%)  Min: 32  Max: 32    05/14 0701 - 05/15 0700  In: 3217.1 [I.V.:2217.1]  Out: 1150 [Urine:1150]   Unmeasured Output  Stool Occurrence: 0       Physical Exam  GA: Alert, comfortable, no acute distress.   HEENT: No scleral icterus or JVD.   Pulmonary: Clear to auscultation A/P/L. No wheezing, crackles, or rhonchi.  Cardiac: RRR S1 & S2 w/o rubs/murmurs/gallops.   Abdominal: Bowel sounds present x 4. No appreciable hepatosplenomegaly.  Skin: No jaundice, rashes, or visible  lesions.  Neuro:  --GCS: E4V4 M6  --Mental Status:  Awake,   follows simple commands  --Pupils 3mm, PERRL.   --Corneal reflex, gag, cough intact.  --Left side spont., R hemiparesis    Medications:  Continuous  sodium chloride 0.9% Last Rate: 75 mL/hr at 05/15/20 0805   niCARdipine Last Rate: 7.5 mg/hr (05/15/20 1025)   Scheduled  hydrALAZINE 25 mg Q8H   senna-docusate 8.6-50 mg 1 tablet Daily   PRN  acetaminophen 650 mg Q4H PRN   hydrALAZINE 10 mg Q4H PRN   labetaloL 10 mg Q4H PRN   sodium chloride 0.9% 10 mL PRN     Today I personally reviewed pertinent medications, lines/drains/airways, imaging, laboratory results,     Diet  Diet NPO        Assessment/Plan:     Neuro  Dysarthria  SLP eval    Hemiparesis, right  Due to stroke  PT/OT    Nontraumatic intracerebral hemorrhage  -New onset of RSW and dysarthria in setting of /129 on presentation, no meds at home.  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   -Telemedicine-stroke done by Dr Looney  -Admitted to [facility]CC  -q1hr neurocheck  -SBP goal <160 On Cardene ggt, currently at goal  -Vascular neurology and STAT NSGY   -PT/INR, PTT   05/15/2020: EVD watch, sbp < 160, PT/OT    Cardiac/Vascular  Essential hypertension  -BP on arrival to outside facility 247/129, required Cardene ggt  -SBP goal <160,  Wean cardene gtt  -add hydralazine 25 mg q6h  -hydralazine and labetalol available    Renal/  Hypovolemia  Continue  IVF          The patient is being Prophylaxed for:  Venous Thromboembolism with: Mechanical  Stress Ulcer with: None  Ventilator Pneumonia with: not applicable    Activity Orders          Diet NPO: NPO starting at 05/14 1056    Commode at bedside starting at 05/14 1056        Full Code    Kera Rubin NP  Neurocritical Care  [facility]    Critical care time > 30 min.

## 2020-05-15 NOTE — SUBJECTIVE & OBJECTIVE
Review of Systems  Unable to obtain a complete ROS due to level of consciousness.  Objective:     Vitals:  Temp: 97.6 °F (36.4 °C)  Pulse: 80  Rhythm: normal sinus rhythm  BP: 117/65  MAP (mmHg): 85  Resp: 20  SpO2: 97 %  O2 Device (Oxygen Therapy): nasal cannula    Temp  Min: 97.6 °F (36.4 °C)  Max: 100 °F (37.8 °C)  Pulse  Min: 71  Max: 116  BP  Min: 112/86  Max: 153/85  MAP (mmHg)  Min: 77  Max: 112  Resp  Min: 18  Max: 39  SpO2  Min: 94 %  Max: 97 %  Oxygen Concentration (%)  Min: 32  Max: 32    05/14 0701 - 05/15 0700  In: 3217.1 [I.V.:2217.1]  Out: 1150 [Urine:1150]   Unmeasured Output  Stool Occurrence: 0       Physical Exam  GA: Alert, comfortable, no acute distress.   HEENT: No scleral icterus or JVD.   Pulmonary: Clear to auscultation A/P/L. No wheezing, crackles, or rhonchi.  Cardiac: RRR S1 & S2 w/o rubs/murmurs/gallops.   Abdominal: Bowel sounds present x 4. No appreciable hepatosplenomegaly.  Skin: No jaundice, rashes, or visible lesions.  Neuro:  --GCS: E4V4 M6  --Mental Status:  Awake,   follows simple commands  --Pupils 3mm, PERRL.   --Corneal reflex, gag, cough intact.  --GARCIA spont    Medications:  Continuous  sodium chloride 0.9% Last Rate: 75 mL/hr at 05/15/20 0805   niCARdipine Last Rate: 7.5 mg/hr (05/15/20 1025)   Scheduled  hydrALAZINE 25 mg Q8H   senna-docusate 8.6-50 mg 1 tablet Daily   PRN  acetaminophen 650 mg Q4H PRN   hydrALAZINE 10 mg Q4H PRN   labetaloL 10 mg Q4H PRN   sodium chloride 0.9% 10 mL PRN     Today I personally reviewed pertinent medications, lines/drains/airways, imaging, laboratory results,     Diet  Diet NPO

## 2020-05-15 NOTE — PT/OT/SLP EVAL
Physical Therapy Evaluation    Patient Name:  Brett Vega   MRN:  [id number]    Recommendations:     Discharge Recommendations:  rehabilitation facility   Discharge Equipment Recommendations: (TBD as pt progresses with mobility)   Barriers to discharge: Decreased caregiver support    Assessment:     Brett Vega is a 75 y.o. male admitted with a medical diagnosis of <principal problem not specified>.  He presents with the following impairments/functional limitations:  weakness, impaired functional mobilty, gait instability, impaired endurance, impaired cognition, decreased lower extremity function, decreased upper extremity function, decreased safety awareness, impaired cardiopulmonary response to activity . Pt is unsafe with functional mobility at this time due to pt requires total assist for bed mobility, moderate assist of 2 for transfers, and total assist for sitting balance due to posterior and R sided LOB. Pt with poor midline orientation and tends to push to the R with his L UE in sitting. Pt appears motivated to progress with functional mobility.    Rehab Prognosis: Fair; patient would benefit from acute skilled PT services to address these deficits and reach maximum level of function.    Recent Surgery: * No surgery found *      Plan:     During this hospitalization, patient to be seen 4 x/week to address the identified rehab impairments via gait training, therapeutic activities, therapeutic exercises, neuromuscular re-education and progress toward the following goals:    · Plan of Care Expires:  06/14/20    Subjective   Pt inconsistent with following commands during treatment  Pain/Comfort:  · Pain Rating 1: 0/10(pt did not c/o pain during treatment)  · Pain Rating Post-Intervention 1: 0/10    Patients cultural, spiritual, Confucianist conflicts given the current situation: no    Living Environment:  Pt lives with his wife in a 1 story home with no JO  Prior to admission, patients level of function  was independent.  Equipment used at home: none.   Upon discharge, patient will have assistance from wife.    Objective:     Communicated with nurse prior to session.  Patient found supine with arterial line, blood pressure cuff, Condom Catheter, SCD, peripheral IV, pulse ox (continuous), telemetry  upon PT entry to room.    General Precautions: Standard, aspiration, fall   Orthopedic Precautions:N/A   Braces: N/A     Exams:  · Cognitive Exam:  Patient is oriented to Person  · Sensation:    · -       Intact  light/touch L LE; impaired R LE due to inconsistent with responses to lt touch   · RLE ROM: WFL  · RLE Strength: Deficits: minimal active movement noted into hip ext and knee ext with manual facilitation; no hip flex, knee flex or ankle active movement noted  · LLE ROM: WFL  · LLE Strength: WFL    Functional Mobility:  · Bed Mobility:     · Rolling Right: total assistance  · Supine to Sit: total assistance  · Sit to Supine: total assistance and of 2 persons  · Transfers:     · Sit to Stand:  moderate assistance and of 2 persons with hand-held assist    Therapeutic Activities and Exercises:   pt sat on the EOB ~ 8 min with total assist due to pt pushing to the R with L UE causing LOB to the R and posterior. Pt stood with HHA with moderate assist of 2 for ~ 20 sec.    AM-PAC 6 CLICK MOBILITY  Total Score:9     Patient left supine with all lines intact, call button in reach, bed alarm on and nurse notified.    GOALS:   Multidisciplinary Problems     Physical Therapy Goals        Problem: Physical Therapy Goal    Goal Priority Disciplines Outcome Goal Variances Interventions   Physical Therapy Goal     PT, PT/OT Ongoing, Progressing     Description:  PT goals until 5/28/20    1. Pt supine to sit with minimal assist-not met  2. Pt sit to supine with minimal assist-not met  3. Pt sit to stand with hemiwalker with minimal assist-not met  4. Pt to perform gait 5ft with hemiwalker with max assist.-not met  5. Pt to  transfer bed to/from bedside chair with hemiwalker with moderate assist.-not met  6. Pt to perform B LE exs in sitting or supine x 15 reps to strengthen B LE to improve functional mobility.-not met  7. Pt to sit on the EOB ~ 10 min with CGA to allow pt to perform functional activities-not met                     History:     History reviewed. No pertinent past medical history.    History reviewed. No pertinent surgical history.    Time Tracking:     PT Received On: 05/15/20  PT Start Time: 0928     PT Stop Time: 0940  PT Total Time (min): 12 min     Billable Minutes: Evaluation 12      Marissa Guajardo, PT  05/15/2020

## 2020-05-15 NOTE — CONSULTS
"  [facility]  Adult Nutrition  Consult Note    SUMMARY     Recommendations    1.) ADAT to regular; texture per SLP.   2.) Initiate enteral nutrition within 48hr: suggest Impact Peptide @ 20mL/hr advancing 10mL q4h to goal rate 50mL/hr to provide 1800 kcals, 113gm protein and 924mL of free water.    MD to manage fluid.    If GRV >500mL, hold TF q4h then restart regimen.    TF to meet 106% EEN and 100% EPN.   3.) Suggest MVI.   4.) Daily weights    Goals: 1.) Pt to return to nutritionally adequate diet by follow up.   Nutrition Goal Status: new  Communication of RD Recs: reviewed with RN    Reason for Assessment    Reason For Assessment: consult  Diagnosis: hemorrhage  Relevant Medical History: None  Interdisciplinary Rounds: did not attend  General Information Comments: Pt currently NPO with failed RENAN. NGT placed with TF to possibly begin today. Pt is visually impaired (legally blind). Unable to obtain much information at this time due to multiple disciplines at bedside. No GI distress. No wt hx per chart. NFPE unable to be completed at this time.   Nutrition Discharge Planning: Unable to determine at this time.     Nutrition Risk Screen    Nutrition Risk Screen: no indicators present    Nutrition/Diet History    Food Allergies: NKFA  Factors Affecting Nutritional Intake: NPO    Anthropometrics    Temp: 97.7 °F (36.5 °C)  Height: 5' 10" (177.8 cm)  Height (inches): 70 in  Weight Method: Bed Scale  Weight: 95.4 kg (210 lb 5.1 oz)  Weight (lb): 210.32 lb  Ideal Body Weight (IBW), Male: 166 lb  % Ideal Body Weight, Male (lb): 126.7 %  BMI (Calculated): 30.2  BMI Grade: 30 - 34.9- obesity - grade I       Lab/Procedures/Meds    Pertinent Labs Reviewed: reviewed  Pertinent Labs Comments: HbA1C 5.4, Ca 8.1, Phos 2.6, ALP 53, ALT 9  Pertinent Medications Reviewed: reviewed  Pertinent Medications Comments: -      Estimated/Assessed Needs    Weight Used For Calorie Calculations: 95.4 kg (210 lb 5.1 " oz)  Energy Calorie Requirements (kcal): 1705  Energy Need Method: [place]-St Jeor(no AF)  Protein Requirements: 113-151(g/day)  Weight Used For Protein Calculations: 75.4 kg (166 lb 3.6 oz)(1.5-2.0 g/kg of IBW)     Estimated Fluid Requirement Method: RDA Method(or per MD)  RDA Method (mL): 1705       Nutrition Prescription Ordered    Current Diet Order: NPO    Evaluation of Received Nutrient/Fluid Intake    IV Fluid (mL): 1800  I/O: +2.6L since admit  Energy Calories Required: not meeting needs  Protein Required: not meeting needs  Fluid Required: other (see comments)(per MD)  Comments: LBM 5/13  % Intake of Estimated Energy Needs: 0 - 25 %  % Meal Intake: NPO    Nutrition Risk    Level of Risk/Frequency of Follow-up: high(x2/week)     Assessment and Plan  Nutrition Problem  Inadequate energy intake    Related to (etiology):   Current diet (NPO), failed swallow evaluation    Signs and Symptoms (as evidenced by):   <75% of nutritional needs being met    Interventions(treatment strategy):  Collaboration of care with other providers  Referral of care    Nutrition Diagnosis Status:   New         Monitor and Evaluation    Food and Nutrient Intake: energy intake, enteral nutrition intake  Food and Nutrient Adminstration: diet order, enteral and parenteral nutrition administration  Anthropometric Measurements: weight, weight change, body mass index  Biochemical Data, Medical Tests and Procedures: electrolyte and renal panel, gastrointestinal profile, glucose/endocrine profile, inflammatory profile  Nutrition-Focused Physical Findings: overall appearance     Malnutrition Assessment  Due to recent hospital wide restrictions to limit the transfer of (COVID-19), we are not performing any physical exams at this time. All S/S will be observational; NFPE to be performed at a future date.      Nutrition Follow-Up    RD Follow-up?: Yes

## 2020-05-15 NOTE — PLAN OF CARE
Problem: Occupational Therapy Goal  Goal: Occupational Therapy Goal  Description  Goals to be met by: 5/29/2020     Patient will increase functional independence with ADLs by performing:    Grooming while EOB with Minimal Assistance.  Toileting from bedside commode with Moderate Assistance for hygiene and clothing management.   Sitting at edge of bed x10 minutes with Minimal Assistance in preparation of functional seated grooming tasks   Stand pivot transfers with Moderate Assistance.  Toilet transfer to bedside commode with Moderate Assistance.     Outcome: Ongoing, Progressing    Taylor Iyer, OTR/L  Pager: [phone]  5/15/2020

## 2020-05-15 NOTE — ASSESSMENT & PLAN NOTE
76 yo M with no known PMHx presents to [facility] 05/14/2020 transferred from OSH for large L thalamic and caudate ICH with intraventricular extension.  Of note, patient had SBP 240s documented at OSH.  Pt otherwise has no clear risk factors for ICH.  Does not appear to have cognitive deficits on exam, no reported concern for memory issues.  No medications, no EtOH use.  No recent trauma.  No unexplained weight loss or B symptoms.  Admitted to Neuro ICU w/ NSGY following.  Etiology likely hypertensive, rule out vascular malformation w/ further imaging.    Antithrombotics for secondary stroke prevention:  None--ICH  Statins for secondary stroke prevention/HLD: LDL elevated--would not start statin acutely in setting of ICH, but could discuss low dose statin for reduction of stroke risk factors in stroke clinic follow up  Aggressive risk factor modification: HTN  Rehab efforts: PT/OT/SLP/PM&R  Diagnostics ordered/pending: CTA head to eval for vascular malformation  VTE prophylaxis: None: Reason for No Pharmacological VTE Prophylaxis: History of systemic or intracranial bleeding  BP parameters: ICH: SBP <140

## 2020-05-15 NOTE — SUBJECTIVE & OBJECTIVE
Neurologic Chief Complaint: L caudate/ thalamic ICH w/ IVH    Subjective:     Interval History: Patient is seen for follow-up neurological assessment and treatment recommendations: LORY. Currently on cardene for SBP < 140. Hydralazine 25mg q8hrs added to wean off of cardene.    HPI, Past Medical, Family, and Social History remains the same as documented in the initial encounter.     Review of Systems   Constitutional: Negative for chills and fever.   HENT: Negative for congestion and trouble swallowing.    Eyes: Negative for pain and visual disturbance.   Respiratory: Negative for cough and shortness of breath.    Cardiovascular: Negative for chest pain and palpitations.   Gastrointestinal: Negative for abdominal pain, constipation, diarrhea, nausea and vomiting.   Genitourinary: Negative for dysuria and flank pain.   Musculoskeletal: Negative for myalgias.   Neurological: Positive for speech difficulty and weakness. Negative for numbness and headaches.   Psychiatric/Behavioral: The patient is not nervous/anxious.      Scheduled Meds:   hydrALAZINE  25 mg Oral Q8H    senna-docusate 8.6-50 mg  1 tablet Oral Daily     Continuous Infusions:   sodium chloride 0.9% 75 mL/hr at 05/15/20 0805    niCARdipine 7.5 mg/hr (05/15/20 1025)     PRN Meds:acetaminophen, hydrALAZINE, labetaloL, sodium chloride 0.9%    Objective:     Vital Signs (Most Recent):  Temp: 97.6 °F (36.4 °C) (05/15/20 0705)  Pulse: 80 (05/15/20 0905)  Resp: 20 (05/15/20 0905)  BP: 117/65 (05/15/20 0905)  SpO2: 97 % (05/15/20 0905)  BP Location: Right arm    Vital Signs Range (Last 24H):  Temp:  [97.6 °F (36.4 °C)-100 °F (37.8 °C)]   Pulse:  []   Resp:  [18-39]   BP: (112-153)/(60-86)   SpO2:  [94 %-97 %]   Arterial Line BP: (108-143)/(55-71)   BP Location: Right arm    Physical Exam   Constitutional: He is oriented to person, place, and time. No distress.   Cardiovascular: Normal rate and regular rhythm.   Pulmonary/Chest: Effort normal.    Abdominal: Soft.   Neurological: He is alert and oriented to person, place, and time.   Skin: Skin is warm. He is not diaphoretic.       Neurological Exam:   LOC: alert  Attention Span: Good   Language: No aphasia  Articulation: Dysarthria  Motor: Arm left  Normal 5/5  Leg left  Normal 5/5  Arm right  Plegia 0/5  Leg right Plegia 0/5  Cebellar: No evidence of appendicular or axial ataxia  Sensation: Intact to light touch, temperature and vibration    Laboratory:  CMP:   Recent Labs   Lab 05/15/20  0153   CALCIUM 8.1*   ALBUMIN 3.6   PROT 6.7      K 4.0   CO2 26      BUN 13   CREATININE 0.8   ALKPHOS 53*   ALT 9*   AST 18   BILITOT 0.5     CBC:   Recent Labs   Lab 05/15/20  0153   WBC 8.70   RBC 4.58*   HGB 13.2*   HCT 42.5      MCV 93   MCH 28.8   MCHC 31.1*       Diagnostic Results     Brain imagin2020 CT head w/o contrast:      Redemonstration of an acute parenchymal hemorrhage centered within the left basal ganglia with intraventricular extension.  Persistent ventricular prominence, stable in size when compared to prior study, in keeping with evolving hydrocephalus.      Vessel Imaging:  Pending     Cardiac Evaluation:   None

## 2020-05-15 NOTE — PLAN OF CARE
Problem: SLP Goal  Goal: SLP Goal  Description  Speech Language Pathology Goals  Goals expected to be met by 5/21    1. Pt will participate in ongoing swallow assessment  2. Pt will utilize speech strategies with min cues  3. Pt will follow complex commands with 50% accy and min cues  4. Pt will complete problem solving task with 80% accy and min cues           5/15/2020 1235 by Johnna Duran CCC-SLP  Outcome: Ongoing, Progressing     Continue POC at this time, all goals remain appropriate.   Johnna Duran CCC-SLP  5/15/2020

## 2020-05-16 LAB
ALBUMIN SERPL BCP-MCNC: 3.8 G/DL (ref 3.5–5.2)
ALP SERPL-CCNC: 57 U/L (ref 55–135)
ALT SERPL W/O P-5'-P-CCNC: 10 U/L (ref 10–44)
ANION GAP SERPL CALC-SCNC: 9 MMOL/L (ref 8–16)
AST SERPL-CCNC: 24 U/L (ref 10–40)
BASOPHILS # BLD AUTO: 0.04 K/UL (ref 0–0.2)
BASOPHILS NFR BLD: 0.4 % (ref 0–1.9)
BILIRUB SERPL-MCNC: 0.5 MG/DL (ref 0.1–1)
BUN SERPL-MCNC: 13 MG/DL (ref 8–23)
CALCIUM SERPL-MCNC: 8.8 MG/DL (ref 8.7–10.5)
CHLORIDE SERPL-SCNC: 108 MMOL/L (ref 95–110)
CO2 SERPL-SCNC: 23 MMOL/L (ref 23–29)
CREAT SERPL-MCNC: 0.8 MG/DL (ref 0.5–1.4)
DIFFERENTIAL METHOD: ABNORMAL
EOSINOPHIL # BLD AUTO: 0 K/UL (ref 0–0.5)
EOSINOPHIL NFR BLD: 0.4 % (ref 0–8)
ERYTHROCYTE [DISTWIDTH] IN BLOOD BY AUTOMATED COUNT: 13 % (ref 11.5–14.5)
EST. GFR  (AFRICAN AMERICAN): >60 ML/MIN/1.73 M^2
EST. GFR  (NON AFRICAN AMERICAN): >60 ML/MIN/1.73 M^2
GLUCOSE SERPL-MCNC: 105 MG/DL (ref 70–110)
HCT VFR BLD AUTO: 45.2 % (ref 40–54)
HGB BLD-MCNC: 14.1 G/DL (ref 14–18)
IMM GRANULOCYTES # BLD AUTO: 0.02 K/UL (ref 0–0.04)
IMM GRANULOCYTES NFR BLD AUTO: 0.2 % (ref 0–0.5)
LYMPHOCYTES # BLD AUTO: 1.4 K/UL (ref 1–4.8)
LYMPHOCYTES NFR BLD: 12.4 % (ref 18–48)
MAGNESIUM SERPL-MCNC: 2 MG/DL (ref 1.6–2.6)
MCH RBC QN AUTO: 28.6 PG (ref 27–31)
MCHC RBC AUTO-ENTMCNC: 31.2 G/DL (ref 32–36)
MCV RBC AUTO: 92 FL (ref 82–98)
MONOCYTES # BLD AUTO: 0.9 K/UL (ref 0.3–1)
MONOCYTES NFR BLD: 8.2 % (ref 4–15)
NEUTROPHILS # BLD AUTO: 9 K/UL (ref 1.8–7.7)
NEUTROPHILS NFR BLD: 78.4 % (ref 38–73)
NRBC BLD-RTO: 0 /100 WBC
PHOSPHATE SERPL-MCNC: 2 MG/DL (ref 2.7–4.5)
PLATELET # BLD AUTO: 256 K/UL (ref 150–350)
PMV BLD AUTO: 9.3 FL (ref 9.2–12.9)
POTASSIUM SERPL-SCNC: 3.9 MMOL/L (ref 3.5–5.1)
PROT SERPL-MCNC: 6.9 G/DL (ref 6–8.4)
RBC # BLD AUTO: 4.93 M/UL (ref 4.6–6.2)
SODIUM SERPL-SCNC: 140 MMOL/L (ref 136–145)
WBC # BLD AUTO: 11.42 K/UL (ref 3.9–12.7)

## 2020-05-16 PROCEDURE — 99232 PR SUBSEQUENT HOSPITAL CARE,LEVL II: ICD-10-PCS | Mod: GC,,, | Performed by: NEUROLOGICAL SURGERY

## 2020-05-16 PROCEDURE — 25000003 PHARM REV CODE 250: Performed by: PSYCHIATRY & NEUROLOGY

## 2020-05-16 PROCEDURE — 20000000 HC ICU ROOM

## 2020-05-16 PROCEDURE — 99233 PR SUBSEQUENT HOSPITAL CARE,LEVL III: ICD-10-PCS | Mod: GC,,, | Performed by: PSYCHIATRY & NEUROLOGY

## 2020-05-16 PROCEDURE — 94761 N-INVAS EAR/PLS OXIMETRY MLT: CPT

## 2020-05-16 PROCEDURE — 99291 CRITICAL CARE FIRST HOUR: CPT | Mod: ,,, | Performed by: NURSE PRACTITIONER

## 2020-05-16 PROCEDURE — 25000003 PHARM REV CODE 250: Performed by: NURSE PRACTITIONER

## 2020-05-16 PROCEDURE — 25000242 PHARM REV CODE 250 ALT 637 W/ HCPCS: Performed by: NURSE PRACTITIONER

## 2020-05-16 PROCEDURE — 99232 SBSQ HOSP IP/OBS MODERATE 35: CPT | Mod: GC,,, | Performed by: NEUROLOGICAL SURGERY

## 2020-05-16 PROCEDURE — 99233 SBSQ HOSP IP/OBS HIGH 50: CPT | Mod: GC,,, | Performed by: PSYCHIATRY & NEUROLOGY

## 2020-05-16 PROCEDURE — 27000221 HC OXYGEN, UP TO 24 HOURS

## 2020-05-16 PROCEDURE — 25000003 PHARM REV CODE 250: Performed by: STUDENT IN AN ORGANIZED HEALTH CARE EDUCATION/TRAINING PROGRAM

## 2020-05-16 PROCEDURE — 51702 INSERT TEMP BLADDER CATH: CPT

## 2020-05-16 PROCEDURE — 99291 PR CRITICAL CARE, E/M 30-74 MINUTES: ICD-10-PCS | Mod: ,,, | Performed by: NURSE PRACTITIONER

## 2020-05-16 PROCEDURE — 84100 ASSAY OF PHOSPHORUS: CPT

## 2020-05-16 PROCEDURE — 85025 COMPLETE CBC W/AUTO DIFF WBC: CPT

## 2020-05-16 PROCEDURE — 94640 AIRWAY INHALATION TREATMENT: CPT

## 2020-05-16 PROCEDURE — 80053 COMPREHEN METABOLIC PANEL: CPT

## 2020-05-16 PROCEDURE — 83735 ASSAY OF MAGNESIUM: CPT

## 2020-05-16 RX ORDER — SODIUM,POTASSIUM PHOSPHATES 280-250MG
2 POWDER IN PACKET (EA) ORAL
Status: DISCONTINUED | OUTPATIENT
Start: 2020-05-16 | End: 2020-05-16

## 2020-05-16 RX ORDER — SODIUM,POTASSIUM PHOSPHATES 280-250MG
2 POWDER IN PACKET (EA) ORAL
Status: DISCONTINUED | OUTPATIENT
Start: 2020-05-16 | End: 2020-05-20

## 2020-05-16 RX ORDER — LANOLIN ALCOHOL/MO/W.PET/CERES
800 CREAM (GRAM) TOPICAL
Status: DISCONTINUED | OUTPATIENT
Start: 2020-05-16 | End: 2020-05-16

## 2020-05-16 RX ORDER — POTASSIUM CHLORIDE 20 MEQ/15ML
40 SOLUTION ORAL
Status: DISCONTINUED | OUTPATIENT
Start: 2020-05-16 | End: 2020-05-16

## 2020-05-16 RX ORDER — SILODOSIN 4 MG/1
4 CAPSULE ORAL DAILY
Status: DISCONTINUED | OUTPATIENT
Start: 2020-05-16 | End: 2020-05-18

## 2020-05-16 RX ORDER — POTASSIUM CHLORIDE 20 MEQ/15ML
40 SOLUTION ORAL
Status: DISCONTINUED | OUTPATIENT
Start: 2020-05-16 | End: 2020-05-20

## 2020-05-16 RX ORDER — IPRATROPIUM BROMIDE AND ALBUTEROL SULFATE 2.5; .5 MG/3ML; MG/3ML
3 SOLUTION RESPIRATORY (INHALATION) EVERY 6 HOURS
Status: DISCONTINUED | OUTPATIENT
Start: 2020-05-16 | End: 2020-05-18

## 2020-05-16 RX ORDER — AMOXICILLIN 250 MG
1 CAPSULE ORAL DAILY
Status: DISCONTINUED | OUTPATIENT
Start: 2020-05-17 | End: 2020-05-17

## 2020-05-16 RX ORDER — LANOLIN ALCOHOL/MO/W.PET/CERES
800 CREAM (GRAM) TOPICAL
Status: DISCONTINUED | OUTPATIENT
Start: 2020-05-16 | End: 2020-05-20

## 2020-05-16 RX ORDER — HYDRALAZINE HYDROCHLORIDE 50 MG/1
50 TABLET, FILM COATED ORAL EVERY 8 HOURS
Status: DISCONTINUED | OUTPATIENT
Start: 2020-05-16 | End: 2020-05-16

## 2020-05-16 RX ORDER — ACETAMINOPHEN 325 MG/1
650 TABLET ORAL EVERY 4 HOURS PRN
Status: DISCONTINUED | OUTPATIENT
Start: 2020-05-16 | End: 2020-05-26 | Stop reason: HOSPADM

## 2020-05-16 RX ORDER — LOSARTAN POTASSIUM 25 MG/1
50 TABLET ORAL DAILY
Status: DISCONTINUED | OUTPATIENT
Start: 2020-05-16 | End: 2020-05-18

## 2020-05-16 RX ORDER — HYDRALAZINE HYDROCHLORIDE 50 MG/1
50 TABLET, FILM COATED ORAL EVERY 8 HOURS
Status: DISCONTINUED | OUTPATIENT
Start: 2020-05-16 | End: 2020-05-18

## 2020-05-16 RX ADMIN — HYDRALAZINE HYDROCHLORIDE 50 MG: 50 TABLET, FILM COATED ORAL at 02:05

## 2020-05-16 RX ADMIN — NICARDIPINE HYDROCHLORIDE 5 MG/HR: 0.2 INJECTION, SOLUTION INTRAVENOUS at 11:05

## 2020-05-16 RX ADMIN — POTASSIUM & SODIUM PHOSPHATES POWDER PACK 280-160-250 MG 2 PACKET: 280-160-250 PACK at 11:05

## 2020-05-16 RX ADMIN — LOSARTAN POTASSIUM 50 MG: 25 TABLET ORAL at 11:05

## 2020-05-16 RX ADMIN — SENNOSIDES AND DOCUSATE SODIUM 1 TABLET: 8.6; 5 TABLET ORAL at 08:05

## 2020-05-16 RX ADMIN — HYDRALAZINE HYDROCHLORIDE 50 MG: 50 TABLET, FILM COATED ORAL at 09:05

## 2020-05-16 RX ADMIN — IPRATROPIUM BROMIDE AND ALBUTEROL SULFATE 3 ML: .5; 3 SOLUTION RESPIRATORY (INHALATION) at 05:05

## 2020-05-16 RX ADMIN — SILODOSIN 4 MG: 4 CAPSULE ORAL at 11:05

## 2020-05-16 RX ADMIN — POTASSIUM & SODIUM PHOSPHATES POWDER PACK 280-160-250 MG 2 PACKET: 280-160-250 PACK at 03:05

## 2020-05-16 RX ADMIN — SODIUM CHLORIDE: 0.9 INJECTION, SOLUTION INTRAVENOUS at 05:05

## 2020-05-16 RX ADMIN — HYDRALAZINE HYDROCHLORIDE 25 MG: 25 TABLET, FILM COATED ORAL at 06:05

## 2020-05-16 NOTE — ASSESSMENT & PLAN NOTE
-BP on arrival to outside facility 247/129, required Cardene ggt  -SBP goal <160,  Wean cardene gtt  - increase hydralazine 50 mg q8h  - add losartan 50 mg daily  -hydralazine and labetalol available

## 2020-05-16 NOTE — PROGRESS NOTES
[facility]  Neurocritical Care  Progress Note    Admit Date: 5/14/2020  Service Date: 05/16/2020  Length of Stay: 2    Subjective:     Chief Complaint: <principal problem not specified>    History of Present Illness: 74 yo male with no PMHX and no contact with health care his entire adult life transferred from [facility] after he presented there for new onset of dense right hemiparesis and marked dysarthria. Onset unclear but likely around 3am this morning. No recent illness. Legally blind. BP on arrival to outside facility 247/129. On Cardene ggt. 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 Telemedicine-stroke done by Dr Looney.  Currently pt awake, alert, oriented, following commands. No movement against gravity on the right side. On Cardene ggt. STAT consult to NSGY. Awaiting recs.     Hospital Course: 05/15/2020: SBP < 160, add hydralazine, PT/OT, evd watch  5/16/2020: increase Hydralazine, add losartan, start tube feeds, follow up CTH in AM, add silodosin and place cordova catheter, replace electrolytes PRN, wean cardene gtt        Review of Systems  Review of Symptoms:  Constitutional: Denies fevers, weight loss, chills, or weakness.  Eyes: Denies changes in vision.  ENT: Denies dysphagia, nasal discharge, ear pain or discharge.  Cardiovascular: Denies chest pain, palpitations, orthopnea, or claudication.  Respiratory: Denies shortness of breath, cough, hemoptysis, or wheezing.  GI: Denies nausea/vomitting, hematochezia, melena, abd pain, or changes in appetite.  : Denies dysuria, incontinence, or hematuria.  Musculoskeletal: Denies joint pain or myalgias.  Skin/breast: Denies rashes, lumps, lesions, or discharge.  Neurologic: Denies headache, dizziness, vertigo, or paresthesias.  Psychiatric: Denies changes in mood or hallucinations.  Endocrine: Denies polyuria, polydipsia, heat/cold intolerance.  Hematologic/Lymph:  Denies lymphadenopathy, easy bruising or easy bleeding.  Allergic/Immunologic: Denies rash, rhinitis.   Objective:     Vitals:  Temp: 98.6 °F (37 °C)  Pulse: 84  Rhythm: normal sinus rhythm  BP: (!) 145/82  MAP (mmHg): 108  Resp: 20  SpO2: 95 %  Oxygen Concentration (%): 32  O2 Device (Oxygen Therapy): nasal cannula    Temp  Min: 97.1 °F (36.2 °C)  Max: 98.7 °F (37.1 °C)  Pulse  Min: 71  Max: 106  BP  Min: 141/81  Max: 179/101  MAP (mmHg)  Min: 101  Max: 133  Resp  Min: 17  Max: 29  SpO2  Min: 92 %  Max: 98 %  Oxygen Concentration (%)  Min: 32  Max: 32    05/15 0701 - 05/16 0700  In: 2191 [I.V.:2101]  Out: 3300 [Urine:3300]   Unmeasured Output  Urine Occurrence: 0  Stool Occurrence: 0  Pad Count: 2       Physical Exam  GA: Alert, comfortable, no acute distress.   HEENT: No scleral icterus or JVD.   Pulmonary: Clear to auscultation A/P/L. No wheezing, crackles, or rhonchi.  Cardiac: RRR S1 & S2 w/o rubs/murmurs/gallops.   Abdominal: Bowel sounds present x 4. No appreciable hepatosplenomegaly.  Skin: No jaundice, rashes, or visible lesions.  Neuro:  --GCS: E4 V4M6  --Mental Status:  Awake, alert, oriented to self, follows commands, R facial droop  --CN II-XII grossly intact.   --Pupils 3mm, PERRL.   --Corneal reflex, gag, cough intact.  --GARCIA spont    Medications:  Continuous  sodium chloride 0.9% Last Rate: 75 mL/hr at 05/16/20 1305   niCARdipine Last Rate: 2.5 mg/hr (05/16/20 1305)   Scheduled  hydrALAZINE 50 mg Q8H   losartan 50 mg Daily   [START ON 5/17/2020] senna-docusate 8.6-50 mg 1 tablet Daily   silodosin 4 mg Daily   PRN  acetaminophen 650 mg Q4H PRN   hydrALAZINE 10 mg Q4H PRN   labetaloL 10 mg Q4H PRN   magnesium oxide 800 mg PRN   magnesium oxide 800 mg PRN   potassium chloride 10% 40 mEq PRN   potassium chloride 10% 40 mEq PRN   potassium chloride 10% 40 mEq PRN   potassium, sodium phosphates 2 packet PRN   potassium, sodium phosphates 2 packet PRN   potassium, sodium phosphates 2 packet PRN   sodium  chloride 0.9% 10 mL PRN     Today I personally reviewed pertinent medications, lines/drains/airways, imaging, laboratory results,         Diet  Diet NPO      Assessment/Plan:     Neuro  Dysarthria  SLP eval    Hemiparesis, right  Due to stroke  PT/OT    Nontraumatic intracerebral hemorrhage  -New onset of RSW and dysarthria in setting of /129 on presentation, no meds at home.  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   -Telemedicine-stroke done by Dr Looney  -Admitted to [facility]  -q1hr neurocheck  -SBP goal <160 On Cardene ggt, currently at goal  -Vascular neurology and STAT NSGY   -PT/INR, PTT   - EVD watch, sbp < 160, PT/OT  5/16/2020:  Increase hydralazine, add losartan, initiated TF, follow CTH tomorrow morning, place cordova cath and start silodosin, replace lytes    Cardiac/Vascular  Essential hypertension  -BP on arrival to outside facility 247/129, required Cardene ggt  -SBP goal <160,  Wean cardene gtt  - increase hydralazine 50 mg q8h  - add losartan 50 mg daily  -hydralazine and labetalol available    Renal/  Hypovolemia  Continue  IVF          The patient is being Prophylaxed for:  Venous Thromboembolism with: Mechanical  Stress Ulcer with: None  Ventilator Pneumonia with: none    Activity Orders          Diet NPO: NPO starting at 05/14 1056    Commode at bedside starting at 05/14 1056        Full Code    Kera Rubin NP  Neurocritical Care  [facility]    Critical care time > 30  Min.

## 2020-05-16 NOTE — PLAN OF CARE
POC reviewed with pt at 0500. Pt verbalized understanding. Wife's questions and concerns addressed via telephone. No acute events overnight. NS gtt continued at 75 ml/hr. NC 3L. Bladder scan and straight cath Q6 for retention continued. Pt progressing toward goals. Will continue to monitor. See flowsheets for full assessment and VS info

## 2020-05-16 NOTE — PROGRESS NOTES
[facility]  Neurosurgery  Progress Note    Subjective:     History of Present Illness: Brett Vega is a 75 y.o. male with no known PMH and no contact with healthcare who [place] [facility] [place] new onset of R hemiparesis and dysarthria. Patient believes onset was around 3 am. Wife awoke this morning and found him unable to more R side with slurred speech so called EMS. BP on arrival to OSH was 247/129. He was started on a cardene gtt. CTH revealed L caudate/thalamic ICH w/ IVH. NSGY consulted for evaluation. Upon my exam, pt has GCS 15, AAOx3 and FC on L side, hemiparetic on R with significant dysarthria. He is legally blind at baseline, denies any acute visual changes. Denies any other complaints. He takes no blood thinners or any other medications.      Post-Op Info:  * No surgery found *         Interval History: NAEON. AFVSS. Patient remains stable. No complaints at this time. No surgical intervention at present    No medications prior to admission.       Review of patient's allergies indicates:  No Known Allergies    History reviewed. No pertinent past medical history.  History reviewed. No pertinent surgical history.  Family History     None        Tobacco Use    Smoking status: Former Smoker    Smokeless tobacco: Never Used   Substance and Sexual Activity    Alcohol use: Not on file    Drug use: Not on file    Sexual activity: Not on file     Review of Systems  Objective:     Weight: 95.3 kg (210 lb)  Body mass index is 30.13 kg/m².  Vital Signs (Most Recent):  Temp: 98.6 °F (37 °C) (05/16/20 1105)  Pulse: 94 (05/16/20 1205)  Resp: (!) 21 (05/16/20 1205)  BP: (!) 144/79 (05/16/20 1205)  SpO2: 95 % (05/16/20 1205) Vital Signs (24h Range):  Temp:  [97.1 °F (36.2 °C)-98.7 °F (37.1 °C)] 98.6 °F (37 °C)  Pulse:  [] 94  Resp:  [17-29] 21  SpO2:  [92 %-98 %] 95 %  BP: (137-179)/() 144/79  Arterial Line BP: (108-177)/() 123/70     Date 05/16/20 0700 -  "05/17/20 0659   Shift 9410-1046 1410-0502 7722-2571 24 Hour Total   INTAKE   I.V.(mL/kg) 555(5.8)   555(5.8)   NG/GT 40   40   Shift Total(mL/kg) 595(6.2)   595(6.2)   OUTPUT   Urine(mL/kg/hr) 1230   1230   Shift Total(mL/kg) 1230(12.9)   1230(12.9)   Weight (kg) 95.3 95.3 95.3 95.3              Oxygen Concentration (%):  [32] 32         NG/OG Tube 05/15/20 1630 Left nostril (Active)   Placement Check placement verified by aspirate characteristics;placement verified by distal tube length measurement;placement verified by x-ray 5/16/2020 11:05 AM   Tolerance no signs/symptoms of discomfort 5/16/2020 11:05 AM   Securement secured to nostril center w/ adhesive device 5/16/2020 11:05 AM   Clamp Status/Tolerance unclamped 5/16/2020 12:05 PM   Insertion Site Appearance no redness, warmth, tenderness, skin breakdown, drainage 5/16/2020 12:05 PM   Flush/Irrigation flushed w/;water;no resistance met 5/16/2020 12:05 PM   Feeding Type continuous 5/16/2020 12:05 PM   Feeding Action feeding restarted 5/16/2020 12:05 PM   Current Rate (mL/hr) 10 mL/hr 5/16/2020 12:05 PM   Goal Rate (mL/hr) 10 mL/hr 5/16/2020 12:05 PM   Intake (mL) 30 mL 5/16/2020 12:05 PM   Intake (mL) - Formula Tube Feeding 10 5/16/2020 12:05 PM   Residual Amount (ml) 0 ml 5/16/2020 12:05 PM            Urethral Catheter 05/16/20 1205 Straight-tip (Active)   Site Assessment Clean;Intact 5/16/2020 12:05 PM   Collection Container Urimeter 5/16/2020 12:05 PM   Securement Method secured to top of thigh w/ adhesive device 5/16/2020 12:05 PM   Catheter Care Performed yes 5/16/2020 12:05 PM   Reason for Continuing Urinary Catheterization Urinary retention 5/16/2020 12:05 PM   CAUTI Prevention Bundle StatLock in place w 1" slack;Intact seal between catheter & drainage tubing;Drainage bag/urimeter off the floor;Green sheeting clip in use;No dependent loops or kinks;Drainage bag/urimeter not overfilled (<2/3 full);Drainage bag/urimeter below bladder 5/16/2020 12:05 PM "   Output (mL) 30 mL 5/16/2020 12:05 PM       Neurosurgery Physical Exam     E3V4M6. AOx2 (self, place). No distress.  CN II-XII intact except for R facial droop. Blind at baseline  FC on LUE/LLE with 5/5 strength  Paretic on RUE/RLE  Sensation intact    Significant Labs:  Recent Labs   Lab 05/15/20  0153 05/16/20  0201    105    140   K 4.0 3.9    108   CO2 26 23   BUN 13 13   CREATININE 0.8 0.8   CALCIUM 8.1* 8.8   MG 1.9 2.0     Recent Labs   Lab 05/15/20  0153 05/16/20  0201   WBC 8.70 11.42   HGB 13.2* 14.1   HCT 42.5 45.2    256     Recent Labs   Lab 05/15/20  0153   INR 1.1   APTT 24.2     Microbiology Results (last 7 days)     ** No results found for the last 168 hours. **        All pertinent labs from the last 24 hours have been reviewed.    Significant Diagnostics:  CT: No results found in the last 24 hours.  MRI: No results found in the last 24 hours.    Assessment/Plan:     Nontraumatic intracerebral hemorrhage   76 y/o M w/ no known PMH who presented with new onset RSW and dysarthria, found to have L caudate tail/thalamic ICH with IVH (ICH score 2):    --Patient admitted to [facility] on telemetry      -q1h neurochecks in ICU  --All labs and diagnostics reviewed  --CTH 5/14 shows L caudate/thalamic ICH with IVH in lateral ventricles (L>R), 3rd, and 4th ventricles. Mild prominence of ventricles but no overt hydrocephalus, stable on repeat CTH   - FU CTH 5/17  --No EVD or other surgical intervention at this time, will follow exam closely for any deterioration and FU repeat scan.  --No anti-plt/coag medications  --HTN: SBP <140 (continue cardene ggt; hydralazine & labetalol PRN) - [facility] managing  --Na >135  --HOB >30  --Full pre-op labs reviewed, coags WNL, T&S reviewed  --Covid test negative 5/14  --Advance nutrition per [facility]  --Continue to monitor clinically, notify NSGY immediately with any changes in neuro status  --Please call with any questions or concerns    Dispo: Per  [facility]        Loco Tucker MD  Neurosurgery  [facility]

## 2020-05-16 NOTE — PLAN OF CARE
Increase hydralazine 50mg q8hrly  Add losartan 50mg daily  Wean off nicardipine  Start TF  Plan CT head in am.   Possible transfer to GNF in am if neurologically and hemodynamically stable.   silodosin for urinary retention; high urinary volume requiring frequent straight cath.  Place cordova for 72hrs

## 2020-05-16 NOTE — SUBJECTIVE & OBJECTIVE
Interval History: NAEON. AFVSS. Patient remains stable. No complaints at this time. No surgical intervention at present    No medications prior to admission.       Review of patient's allergies indicates:  No Known Allergies    History reviewed. No pertinent past medical history.  History reviewed. No pertinent surgical history.  Family History     None        Tobacco Use    Smoking status: Former Smoker    Smokeless tobacco: Never Used   Substance and Sexual Activity    Alcohol use: Not on file    Drug use: Not on file    Sexual activity: Not on file     Review of Systems  Objective:     Weight: 95.3 kg (210 lb)  Body mass index is 30.13 kg/m².  Vital Signs (Most Recent):  Temp: 98.6 °F (37 °C) (05/16/20 1105)  Pulse: 94 (05/16/20 1205)  Resp: (!) 21 (05/16/20 1205)  BP: (!) 144/79 (05/16/20 1205)  SpO2: 95 % (05/16/20 1205) Vital Signs (24h Range):  Temp:  [97.1 °F (36.2 °C)-98.7 °F (37.1 °C)] 98.6 °F (37 °C)  Pulse:  [] 94  Resp:  [17-29] 21  SpO2:  [92 %-98 %] 95 %  BP: (137-179)/() 144/79  Arterial Line BP: (108-177)/() 123/70     Date 05/16/20 0700 - 05/17/20 0659   Shift 9107-9488 2507-6504 0422-0175 24 Hour Total   INTAKE   I.V.(mL/kg) 555(5.8)   555(5.8)   NG/GT 40   40   Shift Total(mL/kg) 595(6.2)   595(6.2)   OUTPUT   Urine(mL/kg/hr) 1230   1230   Shift Total(mL/kg) 1230(12.9)   1230(12.9)   Weight (kg) 95.3 95.3 95.3 95.3              Oxygen Concentration (%):  [32] 32         NG/OG Tube 05/15/20 1630 Left nostril (Active)   Placement Check placement verified by aspirate characteristics;placement verified by distal tube length measurement;placement verified by x-ray 5/16/2020 11:05 AM   Tolerance no signs/symptoms of discomfort 5/16/2020 11:05 AM   Securement secured to nostril center w/ adhesive device 5/16/2020 11:05 AM   Clamp Status/Tolerance unclamped 5/16/2020 12:05 PM   Insertion Site Appearance no redness, warmth, tenderness, skin breakdown, drainage 5/16/2020 12:05 PM  "  Flush/Irrigation flushed w/;water;no resistance met 5/16/2020 12:05 PM   Feeding Type continuous 5/16/2020 12:05 PM   Feeding Action feeding restarted 5/16/2020 12:05 PM   Current Rate (mL/hr) 10 mL/hr 5/16/2020 12:05 PM   Goal Rate (mL/hr) 10 mL/hr 5/16/2020 12:05 PM   Intake (mL) 30 mL 5/16/2020 12:05 PM   Intake (mL) - Formula Tube Feeding 10 5/16/2020 12:05 PM   Residual Amount (ml) 0 ml 5/16/2020 12:05 PM            Urethral Catheter 05/16/20 1205 Straight-tip (Active)   Site Assessment Clean;Intact 5/16/2020 12:05 PM   Collection Container Urimeter 5/16/2020 12:05 PM   Securement Method secured to top of thigh w/ adhesive device 5/16/2020 12:05 PM   Catheter Care Performed yes 5/16/2020 12:05 PM   Reason for Continuing Urinary Catheterization Urinary retention 5/16/2020 12:05 PM   CAUTI Prevention Bundle StatLock in place w 1" slack;Intact seal between catheter & drainage tubing;Drainage bag/urimeter off the floor;Green sheeting clip in use;No dependent loops or kinks;Drainage bag/urimeter not overfilled (<2/3 full);Drainage bag/urimeter below bladder 5/16/2020 12:05 PM   Output (mL) 30 mL 5/16/2020 12:05 PM       Neurosurgery Physical Exam     E3V4M6. AOx2 (self, place). No distress.  CN II-XII intact except for R facial droop. Blind at baseline  FC on LUE/LLE with 5/5 strength  Paretic on RUE/RLE  Sensation intact    Significant Labs:  Recent Labs   Lab 05/15/20  0153 05/16/20  0201    105    140   K 4.0 3.9    108   CO2 26 23   BUN 13 13   CREATININE 0.8 0.8   CALCIUM 8.1* 8.8   MG 1.9 2.0     Recent Labs   Lab 05/15/20  0153 05/16/20  0201   WBC 8.70 11.42   HGB 13.2* 14.1   HCT 42.5 45.2    256     Recent Labs   Lab 05/15/20  0153   INR 1.1   APTT 24.2     Microbiology Results (last 7 days)     ** No results found for the last 168 hours. **        All pertinent labs from the last 24 hours have been reviewed.    Significant Diagnostics:  CT: No results found in the last 24 " hours.  MRI: No results found in the last 24 hours.

## 2020-05-16 NOTE — SUBJECTIVE & OBJECTIVE
Neurologic Chief Complaint: L caudate/ thalamic ICH w/ IVH    Subjective:     Interval History: Patient is seen for follow-up neurological assessment and treatment recommendations: JUDYEON. Stable on exam this AM. Started back on cardene for goal SBP < 140. Also on Hydralazine 25mg q8hrs to help wean cardene.     HPI, Past Medical, Family, and Social History remains the same as documented in the initial encounter.     Review of Systems   Constitutional: Negative for chills and fever.   HENT: Negative for congestion and trouble swallowing.    Eyes: Negative for pain and visual disturbance.   Respiratory: Negative for cough and shortness of breath.    Cardiovascular: Negative for chest pain and palpitations.   Gastrointestinal: Negative for abdominal pain, constipation, diarrhea, nausea and vomiting.   Genitourinary: Negative for dysuria and flank pain.   Musculoskeletal: Negative for myalgias.   Neurological: Positive for speech difficulty and weakness. Negative for numbness and headaches.   Psychiatric/Behavioral: The patient is not nervous/anxious.      Scheduled Meds:   hydrALAZINE  25 mg Oral Q8H    senna-docusate 8.6-50 mg  1 tablet Oral Daily     Continuous Infusions:   sodium chloride 0.9% 75 mL/hr at 05/16/20 1005    niCARdipine 7.5 mg/hr (05/16/20 1034)     PRN Meds:acetaminophen, hydrALAZINE, labetaloL, sodium chloride 0.9%    Objective:     Vital Signs (Most Recent):  Temp: 98.2 °F (36.8 °C) (05/16/20 0705)  Pulse: 92 (05/16/20 1034)  Resp: (!) 23 (05/16/20 1034)  BP: (!) 153/86 (05/16/20 1005)  SpO2: 96 % (05/16/20 1034)  BP Location: Left arm    Vital Signs Range (Last 24H):  Temp:  [97.1 °F (36.2 °C)-98.7 °F (37.1 °C)]   Pulse:  []   Resp:  [17-29]   BP: (130-179)/()   SpO2:  [92 %-98 %]   Arterial Line BP: (108-177)/()   BP Location: Left arm    Physical Exam   Constitutional: He is oriented to person, place, and time. No distress.   Cardiovascular: Normal rate and regular rhythm.    Pulmonary/Chest: Effort normal.   Abdominal: Soft.   Neurological: He is alert and oriented to person, place, and time.   Skin: Skin is warm. He is not diaphoretic.       Neurological Exam:   LOC: alert  Attention Span: Good   Language: No aphasia  Articulation: Dysarthria  Motor: Arm left  Normal 5/5  Leg left  Normal 5/5  Arm right  Plegia 0/5  Leg right Plegia 0/5  Cebellar: No evidence of appendicular or axial ataxia  Sensation: Intact to light touch, temperature and vibration    Laboratory:  CMP:   Recent Labs   Lab 20  0201   CALCIUM 8.8   ALBUMIN 3.8   PROT 6.9      K 3.9   CO2 23      BUN 13   CREATININE 0.8   ALKPHOS 57   ALT 10   AST 24   BILITOT 0.5     CBC:   Recent Labs   Lab 20  0201   WBC 11.42   RBC 4.93   HGB 14.1   HCT 45.2      MCV 92   MCH 28.6   MCHC 31.2*       Diagnostic Results     Brain imagin2020 CT head w/o contrast:      Redemonstration of an acute parenchymal hemorrhage centered within the left basal ganglia with intraventricular extension.  Persistent ventricular prominence, stable in size when compared to prior study, in keeping with evolving hydrocephalus.      Vessel Imaging:  None     Cardiac Evaluation:   None

## 2020-05-16 NOTE — ASSESSMENT & PLAN NOTE
-New onset of RSW and dysarthria in setting of /129 on presentation, no meds at home.  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   -Telemedicine-stroke done by Dr Looney  -Admitted to [facility]  -q1hr neurocheck  -SBP goal <160 On Cardene ggt, currently at goal  -Vascular neurology and STAT NSGY   -PT/INR, PTT   - EVD watch, sbp < 160, PT/OT  5/16/2020:  Increase hydralazine, add losartan, initiated TF, follow CTH tomorrow morning, place cordova cath and start silodosin, replace lytes

## 2020-05-16 NOTE — PROGRESS NOTES
SUNSHINE Nunes notified of patient's phosphorus level of 2.0 and that the patient did not have any PRN replacement orders. Stated she would enter replacement orders. No additional orders at this time. Will continue to monitor.

## 2020-05-16 NOTE — PROGRESS NOTES
[facility]  Vascular Neurology  Comprehensive Stroke Center  Progress Note    Assessment/Plan:     Dysarthria  -2/2 ICH, continue SLP eval and treat    Hemiparesis, right  -2/2 ICH, continue PT eval and treat    Essential hypertension  -Stroke risk factor, likely etiology of ICH in this pt  -Goal SBP < 140, consider starting po anti-hypertensives as needed to wean nicardipine    Nontraumatic intracerebral hemorrhage  76 yo M with no known PMHx presents to [facility] 05/14/2020 transferred from OSH for large L thalamic and caudate ICH with intraventricular extension.  Of note, patient had SBP 240s documented at OSH.  Pt otherwise has no clear risk factors for ICH.  Does not appear to have cognitive deficits on exam, no reported concern for memory issues.  No medications, no EtOH use.  No recent trauma.  No unexplained weight loss or B symptoms.  Admitted to Neuro ICU w/ NSGY following.  Etiology likely hypertensive, rule out vascular malformation w/ further imaging.    -started back on cardene for SBP < 140  -continue to monitor in [facility]    Antithrombotics for secondary stroke prevention:  None--ICH  Statins for secondary stroke prevention/HLD: LDL elevated--would not start statin acutely in setting of ICH, but could discuss low dose statin for reduction of stroke risk factors in stroke clinic follow up  Aggressive risk factor modification: HTN  Rehab efforts: PT/OT/SLP/PM&R  Diagnostics ordered/pending: CTA head to eval for vascular malformation  VTE prophylaxis: None: Reason for No Pharmacological VTE Prophylaxis: History of systemic or intracranial bleeding  BP parameters: ICH: SBP <140         05/14/2020:  Transferred to [facility] from [facility] for L ICH/IVH from caudate and thalamus  05/15/2020:   Continues on cardene for SBP < 140, neurosurgery following.  05/16/2020:   Started back on cardene for SBP < 140 this AM    STROKE DOCUMENTATION   Acute Stroke Times   Last Known Normal Time:  0300  Symptom Onset Date: 05/14/20  Symptom Onset Time: 0700  Stroke Team Called Time: 0806  Stroke Team Arrival Time: 0809  CT Interpretation Time: 0809  Decision to Treat Time for Alteplase: (Contraindicated 2/2 ICH)  Decision to Treat Time for IR: (no LVO)    NIH Scale:  1a. Level of Consciousness: 1-->Not alert, but arousable by minor stimulation to obey, answer, or respond  1b. LOC Questions: 1-->Answers one question correctly  1c. LOC Commands: 0-->Performs both tasks correctly  2. Best Gaze: 0-->Normal  3. Visual: 1-->Partial hemianopia(Pt notes baseline poor vision in R eye on testing)  4. Facial Palsy: 1-->Minor paralysis (flattened nasolabial fold, asymmetry on smiling)  5a. Motor Arm, Left: 0-->No drift, limb holds 90 (or 45) degrees for full 10 secs  5b. Motor Arm, Right: 3-->No effort against gravity, limb falls  6a. Motor Leg, Left: 0-->No drift, leg holds 30 degree position for full 5 secs  6b. Motor Leg, Right: 3-->No effort against gravity, leg falls to bed immediately  7. Limb Ataxia: 0-->Absent  8. Sensory: 1-->Mild-to-moderate sensory loss, patient feels pinprick is less sharp or is dull on the affected side, or there is a loss of superficial pain with pinprick, but patient is aware of being touched  9. Best Language: 0-->No aphasia, normal  10. Dysarthria: 2-->Severe dysarthria, patients speech is so slurred as to be unintelligible in the absence of or out of proportion to any dysphasia, or is mute/anarthric  11. Extinction and Inattention (formerly Neglect): 0-->No abnormality  Total (NIH Stroke Scale): 13      Modified Cary Score: 0  Tammy Coma Scale:    ABCD2 Score:    [id number] Score:   HAS -BLED Score:   ICH Score:1  Hunt & Mccray Classification:      Hemorrhagic change of an Ischemic Stroke: Does this patient have an ischemic stroke with hemorrhagic changes? No     Neurologic Chief Complaint: L caudate/ thalamic ICH w/ IVH    Subjective:     Interval History: Patient is seen for  follow-up neurological assessment and treatment recommendations: LORY. Stable on exam this AM. Started back on cardene for goal SBP < 140. Also on Hydralazine 25mg q8hrs to help wean cardene.     HPI, Past Medical, Family, and Social History remains the same as documented in the initial encounter.     Review of Systems   Constitutional: Negative for chills and fever.   HENT: Negative for congestion and trouble swallowing.    Eyes: Negative for pain and visual disturbance.   Respiratory: Negative for cough and shortness of breath.    Cardiovascular: Negative for chest pain and palpitations.   Gastrointestinal: Negative for abdominal pain, constipation, diarrhea, nausea and vomiting.   Genitourinary: Negative for dysuria and flank pain.   Musculoskeletal: Negative for myalgias.   Neurological: Positive for speech difficulty and weakness. Negative for numbness and headaches.   Psychiatric/Behavioral: The patient is not nervous/anxious.      Scheduled Meds:   hydrALAZINE  25 mg Oral Q8H    senna-docusate 8.6-50 mg  1 tablet Oral Daily     Continuous Infusions:   sodium chloride 0.9% 75 mL/hr at 05/16/20 1005    niCARdipine 7.5 mg/hr (05/16/20 1034)     PRN Meds:acetaminophen, hydrALAZINE, labetaloL, sodium chloride 0.9%    Objective:     Vital Signs (Most Recent):  Temp: 98.2 °F (36.8 °C) (05/16/20 0705)  Pulse: 92 (05/16/20 1034)  Resp: (!) 23 (05/16/20 1034)  BP: (!) 153/86 (05/16/20 1005)  SpO2: 96 % (05/16/20 1034)  BP Location: Left arm    Vital Signs Range (Last 24H):  Temp:  [97.1 °F (36.2 °C)-98.7 °F (37.1 °C)]   Pulse:  []   Resp:  [17-29]   BP: (130-179)/()   SpO2:  [92 %-98 %]   Arterial Line BP: (108-177)/()   BP Location: Left arm    Physical Exam   Constitutional: He is oriented to person, place, and time. No distress.   Cardiovascular: Normal rate and regular rhythm.   Pulmonary/Chest: Effort normal.   Abdominal: Soft.   Neurological: He is alert and oriented to person, place, and  time.   Skin: Skin is warm. He is not diaphoretic.       Neurological Exam:   LOC: alert  Attention Span: Good   Language: No aphasia  Articulation: Dysarthria  Motor: Arm left  Normal 5/5  Leg left  Normal 5/5  Arm right  Plegia 0/5  Leg right Plegia 0/5  Cebellar: No evidence of appendicular or axial ataxia  Sensation: Intact to light touch, temperature and vibration    Laboratory:  CMP:   Recent Labs   Lab 20  0201   CALCIUM 8.8   ALBUMIN 3.8   PROT 6.9      K 3.9   CO2 23      BUN 13   CREATININE 0.8   ALKPHOS 57   ALT 10   AST 24   BILITOT 0.5     CBC:   Recent Labs   Lab 20  0201   WBC 11.42   RBC 4.93   HGB 14.1   HCT 45.2      MCV 92   MCH 28.6   MCHC 31.2*       Diagnostic Results     Brain imagin2020 CT head w/o contrast:      Redemonstration of an acute parenchymal hemorrhage centered within the left basal ganglia with intraventricular extension.  Persistent ventricular prominence, stable in size when compared to prior study, in keeping with evolving hydrocephalus.      Vessel Imaging:  None     Cardiac Evaluation:   None       Nani Aparicio MD  Comprehensive Stroke Center  Department of Vascular Neurology   [facility]

## 2020-05-16 NOTE — ASSESSMENT & PLAN NOTE
76 y/o M w/ no known PMH who presented with new onset RSW and dysarthria, found to have L caudate tail/thalamic ICH with IVH (ICH score 2):    --Patient admitted to [facility] on telemetry      -q1h neurochecks in ICU  --All labs and diagnostics reviewed  --CTH 5/14 shows L caudate/thalamic ICH with IVH in lateral ventricles (L>R), 3rd, and 4th ventricles. Mild prominence of ventricles but no overt hydrocephalus, stable on repeat CTH   - FU CTH 5/17  --No EVD or other surgical intervention at this time, will follow exam closely for any deterioration and FU repeat scan.  --No anti-plt/coag medications  --HTN: SBP <140 (continue cardene ggt; hydralazine & labetalol PRN) - [facility] managing  --Na >135  --HOB >30  --Full pre-op labs reviewed, coags WNL, T&S reviewed  --Covid test negative 5/14  --Advance nutrition per NCC  --Continue to monitor clinically, notify NSGY immediately with any changes in neuro status  --Please call with any questions or concerns    Dispo: Per NCC

## 2020-05-16 NOTE — PLAN OF CARE
POC reviewed with pt and family at 1400. Pt verbalized understanding. Questions and concerns addressed with patient's spouse over the phone. Cardene restarted at this beginning of shift to control SBP <160 mmHg. Scheduled Hydralazine increased and Losartan added to his BP regimen. Trickle feeds via NGT started. Patient unable to void spontaneously- [place] catheter inserted and Silodosin started. Patient is scheduled for head CT in the AM. Will continue to monitor. See flowsheets for full assessment and VS info.

## 2020-05-16 NOTE — SUBJECTIVE & OBJECTIVE
Review of Systems  Review of Symptoms:  Constitutional: Denies fevers, weight loss, chills, or weakness.  Eyes: Denies changes in vision.  ENT: Denies dysphagia, nasal discharge, ear pain or discharge.  Cardiovascular: Denies chest pain, palpitations, orthopnea, or claudication.  Respiratory: Denies shortness of breath, cough, hemoptysis, or wheezing.  GI: Denies nausea/vomitting, hematochezia, melena, abd pain, or changes in appetite.  : Denies dysuria, incontinence, or hematuria.  Musculoskeletal: Denies joint pain or myalgias.  Skin/breast: Denies rashes, lumps, lesions, or discharge.  Neurologic: Denies headache, dizziness, vertigo, or paresthesias.  Psychiatric: Denies changes in mood or hallucinations.  Endocrine: Denies polyuria, polydipsia, heat/cold intolerance.  Hematologic/Lymph: Denies lymphadenopathy, easy bruising or easy bleeding.  Allergic/Immunologic: Denies rash, rhinitis.   Objective:     Vitals:  Temp: 98.6 °F (37 °C)  Pulse: 84  Rhythm: normal sinus rhythm  BP: (!) 145/82  MAP (mmHg): 108  Resp: 20  SpO2: 95 %  Oxygen Concentration (%): 32  O2 Device (Oxygen Therapy): nasal cannula    Temp  Min: 97.1 °F (36.2 °C)  Max: 98.7 °F (37.1 °C)  Pulse  Min: 71  Max: 106  BP  Min: 141/81  Max: 179/101  MAP (mmHg)  Min: 101  Max: 133  Resp  Min: 17  Max: 29  SpO2  Min: 92 %  Max: 98 %  Oxygen Concentration (%)  Min: 32  Max: 32    05/15 0701 - 05/16 0700  In: 2191 [I.V.:2101]  Out: 3300 [Urine:3300]   Unmeasured Output  Urine Occurrence: 0  Stool Occurrence: 0  Pad Count: 2       Physical Exam  GA: Alert, comfortable, no acute distress.   HEENT: No scleral icterus or JVD.   Pulmonary: Clear to auscultation A/P/L. No wheezing, crackles, or rhonchi.  Cardiac: RRR S1 & S2 w/o rubs/murmurs/gallops.   Abdominal: Bowel sounds present x 4. No appreciable hepatosplenomegaly.  Skin: No jaundice, rashes, or visible lesions.  Neuro:  --GCS: E4 V4M6  --Mental Status:  Awake, alert, oriented to self, follows  commands, R facial droop  --CN II-XII grossly intact.   --Pupils 3mm, PERRL.   --Corneal reflex, gag, cough intact.  --GARCIA spont    Medications:  Continuous  sodium chloride 0.9% Last Rate: 75 mL/hr at 05/16/20 1305   niCARdipine Last Rate: 2.5 mg/hr (05/16/20 1305)   Scheduled  hydrALAZINE 50 mg Q8H   losartan 50 mg Daily   [START ON 5/17/2020] senna-docusate 8.6-50 mg 1 tablet Daily   silodosin 4 mg Daily   PRN  acetaminophen 650 mg Q4H PRN   hydrALAZINE 10 mg Q4H PRN   labetaloL 10 mg Q4H PRN   magnesium oxide 800 mg PRN   magnesium oxide 800 mg PRN   potassium chloride 10% 40 mEq PRN   potassium chloride 10% 40 mEq PRN   potassium chloride 10% 40 mEq PRN   potassium, sodium phosphates 2 packet PRN   potassium, sodium phosphates 2 packet PRN   potassium, sodium phosphates 2 packet PRN   sodium chloride 0.9% 10 mL PRN     Today I personally reviewed pertinent medications, lines/drains/airways, imaging, laboratory results,         Diet  Diet NPO

## 2020-05-16 NOTE — ASSESSMENT & PLAN NOTE
74 yo M with no known PMHx [place] [facility] [place] transferred from OSH for large L thalamic and caudate ICH with intraventricular extension.  Of note, patient had SBP 240s documented at OSH.  Pt otherwise has no clear risk factors for ICH.  Does not appear to have cognitive deficits on exam, no reported concern for memory issues.  No medications, no EtOH use.  No recent trauma.  No unexplained weight loss or B symptoms.  Admitted to Neuro ICU w/ NSGY following.  Etiology likely hypertensive, rule out vascular malformation w/ further imaging.    -started back on cardene for SBP < 140  -continue to monitor in [facility]    Antithrombotics for secondary stroke prevention:  None--ICH  Statins for secondary stroke prevention/HLD: LDL elevated--would not start statin acutely in setting of ICH, but could discuss low dose statin for reduction of stroke risk factors in stroke clinic follow up  Aggressive risk factor modification: HTN  Rehab efforts: PT/OT/SLP/PM&R  Diagnostics ordered/pending: CTA head to eval for vascular malformation  VTE prophylaxis: None: Reason for No Pharmacological VTE Prophylaxis: History of systemic or intracranial bleeding  BP parameters: ICH: SBP <140

## 2020-05-17 LAB
ALBUMIN SERPL BCP-MCNC: 3.4 G/DL (ref 3.5–5.2)
ALP SERPL-CCNC: 54 U/L (ref 55–135)
ALT SERPL W/O P-5'-P-CCNC: 11 U/L (ref 10–44)
ANION GAP SERPL CALC-SCNC: 10 MMOL/L (ref 8–16)
AST SERPL-CCNC: 23 U/L (ref 10–40)
BASOPHILS # BLD AUTO: 0.04 K/UL (ref 0–0.2)
BASOPHILS NFR BLD: 0.4 % (ref 0–1.9)
BILIRUB SERPL-MCNC: 0.7 MG/DL (ref 0.1–1)
BUN SERPL-MCNC: 15 MG/DL (ref 8–23)
CALCIUM SERPL-MCNC: 8.7 MG/DL (ref 8.7–10.5)
CHLORIDE SERPL-SCNC: 109 MMOL/L (ref 95–110)
CO2 SERPL-SCNC: 22 MMOL/L (ref 23–29)
CREAT SERPL-MCNC: 0.8 MG/DL (ref 0.5–1.4)
DIFFERENTIAL METHOD: ABNORMAL
EOSINOPHIL # BLD AUTO: 0 K/UL (ref 0–0.5)
EOSINOPHIL NFR BLD: 0.2 % (ref 0–8)
ERYTHROCYTE [DISTWIDTH] IN BLOOD BY AUTOMATED COUNT: 13.2 % (ref 11.5–14.5)
EST. GFR  (AFRICAN AMERICAN): >60 ML/MIN/1.73 M^2
EST. GFR  (NON AFRICAN AMERICAN): >60 ML/MIN/1.73 M^2
GLUCOSE SERPL-MCNC: 112 MG/DL (ref 70–110)
HCT VFR BLD AUTO: 43.8 % (ref 40–54)
HGB BLD-MCNC: 13.8 G/DL (ref 14–18)
IMM GRANULOCYTES # BLD AUTO: 0.04 K/UL (ref 0–0.04)
IMM GRANULOCYTES NFR BLD AUTO: 0.4 % (ref 0–0.5)
LYMPHOCYTES # BLD AUTO: 1.7 K/UL (ref 1–4.8)
LYMPHOCYTES NFR BLD: 15.7 % (ref 18–48)
MAGNESIUM SERPL-MCNC: 2 MG/DL (ref 1.6–2.6)
MCH RBC QN AUTO: 28.9 PG (ref 27–31)
MCHC RBC AUTO-ENTMCNC: 31.5 G/DL (ref 32–36)
MCV RBC AUTO: 92 FL (ref 82–98)
MONOCYTES # BLD AUTO: 1.2 K/UL (ref 0.3–1)
MONOCYTES NFR BLD: 11.3 % (ref 4–15)
NEUTROPHILS # BLD AUTO: 7.9 K/UL (ref 1.8–7.7)
NEUTROPHILS NFR BLD: 72 % (ref 38–73)
NRBC BLD-RTO: 0 /100 WBC
PHOSPHATE SERPL-MCNC: 2.5 MG/DL (ref 2.7–4.5)
PHOSPHATE SERPL-MCNC: 3.3 MG/DL (ref 2.7–4.5)
PLATELET # BLD AUTO: 237 K/UL (ref 150–350)
PMV BLD AUTO: 9.7 FL (ref 9.2–12.9)
POCT GLUCOSE: 105 MG/DL (ref 70–110)
POTASSIUM SERPL-SCNC: 3.5 MMOL/L (ref 3.5–5.1)
POTASSIUM SERPL-SCNC: 4 MMOL/L (ref 3.5–5.1)
PROT SERPL-MCNC: 6.5 G/DL (ref 6–8.4)
RBC # BLD AUTO: 4.77 M/UL (ref 4.6–6.2)
SODIUM SERPL-SCNC: 141 MMOL/L (ref 136–145)
WBC # BLD AUTO: 10.9 K/UL (ref 3.9–12.7)

## 2020-05-17 PROCEDURE — 84100 ASSAY OF PHOSPHORUS: CPT | Mod: 91

## 2020-05-17 PROCEDURE — 99232 SBSQ HOSP IP/OBS MODERATE 35: CPT | Mod: GC,,, | Performed by: NEUROLOGICAL SURGERY

## 2020-05-17 PROCEDURE — 99233 PR SUBSEQUENT HOSPITAL CARE,LEVL III: ICD-10-PCS | Mod: GC,,, | Performed by: PSYCHIATRY & NEUROLOGY

## 2020-05-17 PROCEDURE — 99232 PR SUBSEQUENT HOSPITAL CARE,LEVL II: ICD-10-PCS | Mod: GC,,, | Performed by: NEUROLOGICAL SURGERY

## 2020-05-17 PROCEDURE — 84132 ASSAY OF SERUM POTASSIUM: CPT

## 2020-05-17 PROCEDURE — 94640 AIRWAY INHALATION TREATMENT: CPT

## 2020-05-17 PROCEDURE — 99291 PR CRITICAL CARE, E/M 30-74 MINUTES: ICD-10-PCS | Mod: ,,, | Performed by: NURSE PRACTITIONER

## 2020-05-17 PROCEDURE — 20000000 HC ICU ROOM

## 2020-05-17 PROCEDURE — 25000003 PHARM REV CODE 250: Performed by: NURSE PRACTITIONER

## 2020-05-17 PROCEDURE — 83735 ASSAY OF MAGNESIUM: CPT

## 2020-05-17 PROCEDURE — 85025 COMPLETE CBC W/AUTO DIFF WBC: CPT

## 2020-05-17 PROCEDURE — 80053 COMPREHEN METABOLIC PANEL: CPT

## 2020-05-17 PROCEDURE — 99233 SBSQ HOSP IP/OBS HIGH 50: CPT | Mod: GC,,, | Performed by: PSYCHIATRY & NEUROLOGY

## 2020-05-17 PROCEDURE — 25000242 PHARM REV CODE 250 ALT 637 W/ HCPCS: Performed by: NURSE PRACTITIONER

## 2020-05-17 PROCEDURE — 63700000 PHARM REV CODE 250 ALT 637 W/O HCPCS: Performed by: PSYCHIATRY & NEUROLOGY

## 2020-05-17 PROCEDURE — 99291 CRITICAL CARE FIRST HOUR: CPT | Mod: ,,, | Performed by: NURSE PRACTITIONER

## 2020-05-17 PROCEDURE — 27000221 HC OXYGEN, UP TO 24 HOURS

## 2020-05-17 PROCEDURE — 84100 ASSAY OF PHOSPHORUS: CPT

## 2020-05-17 PROCEDURE — 94761 N-INVAS EAR/PLS OXIMETRY MLT: CPT

## 2020-05-17 PROCEDURE — 25000003 PHARM REV CODE 250: Performed by: PSYCHIATRY & NEUROLOGY

## 2020-05-17 RX ORDER — LABETALOL 100 MG/1
200 TABLET, FILM COATED ORAL EVERY 8 HOURS
Status: DISCONTINUED | OUTPATIENT
Start: 2020-05-17 | End: 2020-05-17

## 2020-05-17 RX ORDER — LABETALOL 100 MG/1
100 TABLET, FILM COATED ORAL EVERY 8 HOURS
Status: DISCONTINUED | OUTPATIENT
Start: 2020-05-17 | End: 2020-05-18

## 2020-05-17 RX ORDER — LABETALOL 100 MG/1
100 TABLET, FILM COATED ORAL EVERY 8 HOURS
Status: DISCONTINUED | OUTPATIENT
Start: 2020-05-17 | End: 2020-05-17

## 2020-05-17 RX ORDER — AMOXICILLIN 250 MG
1 CAPSULE ORAL 2 TIMES DAILY
Status: DISCONTINUED | OUTPATIENT
Start: 2020-05-17 | End: 2020-05-18

## 2020-05-17 RX ORDER — POLYETHYLENE GLYCOL 3350 17 G/17G
17 POWDER, FOR SOLUTION ORAL DAILY
Status: DISCONTINUED | OUTPATIENT
Start: 2020-05-17 | End: 2020-05-18

## 2020-05-17 RX ADMIN — IPRATROPIUM BROMIDE AND ALBUTEROL SULFATE 3 ML: .5; 3 SOLUTION RESPIRATORY (INHALATION) at 01:05

## 2020-05-17 RX ADMIN — HYDRALAZINE HYDROCHLORIDE 50 MG: 50 TABLET, FILM COATED ORAL at 06:05

## 2020-05-17 RX ADMIN — POTASSIUM & SODIUM PHOSPHATES POWDER PACK 280-160-250 MG 2 PACKET: 280-160-250 PACK at 09:05

## 2020-05-17 RX ADMIN — LABETALOL HYDROCHLORIDE 100 MG: 100 TABLET, FILM COATED ORAL at 01:05

## 2020-05-17 RX ADMIN — HYDRALAZINE HYDROCHLORIDE 50 MG: 50 TABLET, FILM COATED ORAL at 01:05

## 2020-05-17 RX ADMIN — POLYETHYLENE GLYCOL 3350 17 G: 17 POWDER, FOR SOLUTION ORAL at 09:05

## 2020-05-17 RX ADMIN — HYDRALAZINE HYDROCHLORIDE 50 MG: 50 TABLET, FILM COATED ORAL at 09:05

## 2020-05-17 RX ADMIN — LABETALOL HYDROCHLORIDE 100 MG: 100 TABLET, FILM COATED ORAL at 09:05

## 2020-05-17 RX ADMIN — SENNOSIDES AND DOCUSATE SODIUM 1 TABLET: 8.6; 5 TABLET ORAL at 09:05

## 2020-05-17 RX ADMIN — IPRATROPIUM BROMIDE AND ALBUTEROL SULFATE 3 ML: .5; 3 SOLUTION RESPIRATORY (INHALATION) at 12:05

## 2020-05-17 RX ADMIN — LOSARTAN POTASSIUM 50 MG: 25 TABLET ORAL at 09:05

## 2020-05-17 RX ADMIN — POTASSIUM CHLORIDE 40 MEQ: 20 SOLUTION ORAL at 06:05

## 2020-05-17 RX ADMIN — IPRATROPIUM BROMIDE AND ALBUTEROL SULFATE 3 ML: .5; 3 SOLUTION RESPIRATORY (INHALATION) at 07:05

## 2020-05-17 RX ADMIN — NICARDIPINE HYDROCHLORIDE 2.5 MG/HR: 0.2 INJECTION, SOLUTION INTRAVENOUS at 12:05

## 2020-05-17 RX ADMIN — POTASSIUM & SODIUM PHOSPHATES POWDER PACK 280-160-250 MG 2 PACKET: 280-160-250 PACK at 06:05

## 2020-05-17 RX ADMIN — NICARDIPINE HYDROCHLORIDE 2.5 MG/HR: 0.2 INJECTION, SOLUTION INTRAVENOUS at 09:05

## 2020-05-17 RX ADMIN — SILODOSIN 4 MG: 4 CAPSULE ORAL at 09:05

## 2020-05-17 NOTE — PROGRESS NOTES
Nursing Transfer Note       Transfer to CT ED @ 0055    Transfer via bed    Transfer with O2, monitor, ambu bag    Transported by RNs x 2    Medicines sent: NS and Cardene    No issue during transport. VSS. Pt back in room at 0110. Pt on monitor and resting comfortably. Will continue to monitor.

## 2020-05-17 NOTE — ASSESSMENT & PLAN NOTE
-BP on arrival to outside facility 247/129, required Abhinav ggt  -SBP goal <160  -d/c cardene gtt  - hydralazine 50 mg q8h  -  losartan 50 mg daily  - 5/17: add labetalol 100 q8hr  -hydralazine and labetalol available

## 2020-05-17 NOTE — PROGRESS NOTES
[facility]  Neurocritical Care  Progress Note    Admit Date: 5/14/2020  Service Date: 05/17/2020  Length of Stay: 3    Subjective:     Chief Complaint: <principal problem not specified>    History of Present Illness: 74 yo male with no PMHX and no contact with health care his entire adult life transferred from [facility] after he presented there for new onset of dense right hemiparesis and marked dysarthria. Onset unclear but likely around 3am this morning. No recent illness. Legally blind. BP on arrival to outside facility 247/129. On Cardene ggt. 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 Telemedicine-stroke done by Dr Looney.  Currently pt awake, alert, oriented, following commands. No movement against gravity on the right side. On Cardene ggt. STAT consult to NSGY. Awaiting recs.     Hospital Course: 05/15/2020: SBP < 160, add hydralazine, PT/OT, evd watch  5/16/2020: increase Hydralazine, add losartan, start tube feeds, follow up CTH in AM, add silodosin and place cordova catheter, replace electrolytes PRN  5/17/2020: CT head stable, advance TF, add labetalol 100 q8hr, d/c cardene gtt        Review of Systems  Unable to obtain a complete ROS due to level of consciousness.  Objective:     Vitals:  Temp: 98.9 °F (37.2 °C)  Pulse: 80  Rhythm: normal sinus rhythm  BP: (!) 174/80  MAP (mmHg): 97  Resp: 20  SpO2: 96 %  Oxygen Concentration (%): 24  O2 Device (Oxygen Therapy): nasal cannula    Temp  Min: 97.6 °F (36.4 °C)  Max: 98.9 °F (37.2 °C)  Pulse  Min: 79  Max: 106  BP  Min: 125/75  Max: 174/80  MAP (mmHg)  Min: 96  Max: 123  Resp  Min: 18  Max: 28  SpO2  Min: 95 %  Max: 98 %  Oxygen Concentration (%)  Min: 24  Max: 24    05/16 0701 - 05/17 0700  In: 2624.6 [I.V.:2194.6]  Out: 2600 [Urine:2600]   Unmeasured Output  Urine Occurrence: 0  Stool Occurrence: 0  Pad Count: 2       Physical Exam  GA:  comfortable, no acute  distress.   HEENT: No scleral icterus or JVD.   Pulmonary: Clear to auscultation A/L.  Cardiac: RRR S1 & S2 w/o rubs/murmurs/gallops.   Abdominal: Bowel sounds present x 4. No appreciable hepatosplenomegaly.  Skin: No jaundice, rashes, or visible lesions.  Neuro:  --GCS: E4 V5 M6  --Mental Status:  Awake oriented X4, follows commands, R facial droop  --Pupils mm, PERRL.   --Corneal reflex, gag, cough intact.  --LUE spontaneous  --RUE some effort, however can not hold against gravity  --LLE spont  --RLE  Some effort against gravity    Medications:  Continuous  sodium chloride 0.9% Last Rate: 75 mL/hr at 05/17/20 0930   Scheduled  albuterol-ipratropium 3 mL Q6H   hydrALAZINE 50 mg Q8H   labetaloL 100 mg Q8H   losartan 50 mg Daily   polyethylene glycol 17 g Daily   senna-docusate 8.6-50 mg 1 tablet BID   silodosin 4 mg Daily   PRN  acetaminophen 650 mg Q4H PRN   hydrALAZINE 10 mg Q4H PRN   labetaloL 10 mg Q4H PRN   magnesium oxide 800 mg PRN   magnesium oxide 800 mg PRN   potassium chloride 10% 40 mEq PRN   potassium chloride 10% 40 mEq PRN   potassium chloride 10% 40 mEq PRN   potassium, sodium phosphates 2 packet PRN   potassium, sodium phosphates 2 packet PRN   potassium, sodium phosphates 2 packet PRN   sodium chloride 0.9% 10 mL PRN     Today I personally reviewed pertinent medications, lines/drains/airways, imaging, laboratory results,     Diet  Diet NPO      Assessment/Plan:     Neuro  Hemiparesis, right  Due to stroke  PT/OT    Nontraumatic intracerebral hemorrhage  -New onset of RSW and dysarthria in setting of /129 on presentation, no meds at home.  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   -Telemedicine-stroke done by Dr Looney  -Admitted to NCC  -q1hr neurocheck  -SBP goal <160 On Cardene ggt, currently at goal  -Vascular neurology and STAT NSGY   -PT/INR, PTT   - EVD watch, sbp < 160, PT/OT  5/16/2020:  Increase hydralazine, add  losartan, initiated TF, follow CTH tomorrow morning, place cordova cath and start silodosin, replace lytes  5/17/2020: CTH stable    Cardiac/Vascular  Essential hypertension  -BP on arrival to outside facility 247/129, required Cardene ggt  -SBP goal <160  -d/c cardene gtt  - hydralazine 50 mg q8h  -  losartan 50 mg daily  - 5/17: add labetalol 100 q8hr  -hydralazine and labetalol available    Renal/  Hypovolemia  Continue  IVF          The patient is being Prophylaxed for:  Venous Thromboembolism with: Mechanical  Stress Ulcer with: None  Ventilator Pneumonia with: not applicable    Activity Orders          Diet NPO: NPO starting at 05/14 1056    Commode at bedside starting at 05/14 1056        Full Code    Kera Rubin NP  Neurocritical Care  [facility]    Critical care time > 30 min.

## 2020-05-17 NOTE — ASSESSMENT & PLAN NOTE
74 y/o M w/ no known PMH who presented with new onset RSW and dysarthria, found to have L caudate tail/thalamic ICH with IVH (ICH score 2):    --Patient admitted to [facility] on telemetry      -q1h neurochecks in ICU  --All labs and diagnostics reviewed  --CTH 5/14 shows L caudate/thalamic ICH with IVH in lateral ventricles (L>R), 3rd, and 4th ventricles. Mild prominence of ventricles but no overt hydrocephalus, stable on repeat CTH   - CTH 5/17: stable  --No EVD or other surgical intervention at this time, will follow exam closely for any deterioration and FU repeat scan.  --No anti-plt/coag medications  --HTN: SBP <140 (continue cardene ggt; hydralazine & labetalol PRN) - [facility] managing  --Na >135  --HOB >30  --Full pre-op labs reviewed, coags WNL, T&S reviewed  --Covid test negative 5/14  --Advance nutrition per [facility]  --Continue to monitor clinically, notify NSGY immediately with any changes in neuro status  --Please call with any questions or concerns    Dispo: Per NCC

## 2020-05-17 NOTE — PLAN OF CARE
CT head stable ICH  Advance TF  Will transition off NS  SBP goal < 160 mmhg  D/c nicardipine, use IV antihypertensives PRN  Add labetalol 100mg q8hrly.   Bowel regimen  Mobilize as tolerated  PT/OT

## 2020-05-17 NOTE — SUBJECTIVE & OBJECTIVE
Interval History: NAEON. AFVSS. Patient remains stable. No complaints at this time. No surgical intervention at present    No medications prior to admission.       Review of patient's allergies indicates:  No Known Allergies    History reviewed. No pertinent past medical history.  History reviewed. No pertinent surgical history.  Family History     None        Tobacco Use    Smoking status: Former Smoker    Smokeless tobacco: Never Used   Substance and Sexual Activity    Alcohol use: Not on file    Drug use: Not on file    Sexual activity: Not on file     Review of Systems    Objective:     Weight: 95.3 kg (210 lb)  Body mass index is 30.13 kg/m².  Vital Signs (Most Recent):  Temp: 98.9 °F (37.2 °C) (05/17/20 0705)  Pulse: 80 (05/17/20 0905)  Resp: 20 (05/17/20 0905)  BP: 138/70 (05/17/20 0905)  SpO2: 95 % (05/17/20 0905) Vital Signs (24h Range):  Temp:  [97.6 °F (36.4 °C)-98.9 °F (37.2 °C)] 98.9 °F (37.2 °C)  Pulse:  [] 80  Resp:  [18-28] 20  SpO2:  [95 %-98 %] 95 %  BP: (125-170)/(70-99) 138/70  Arterial Line BP: (123-169)/(50-83) 138/67     Date 05/17/20 0700 - 05/18/20 0659   Shift 2406-1159 2609-5531 4578-0270 24 Hour Total   INTAKE   I.V.(mL/kg) 385.4(4)   385.4(4)   NG/GT 30   30   Shift Total(mL/kg) 415.4(4.4)   415.4(4.4)   OUTPUT   Urine(mL/kg/hr) 275   275   Shift Total(mL/kg) 275(2.9)   275(2.9)   Weight (kg) 95.3 95.3 95.3 95.3              Oxygen Concentration (%):  [24] 24         NG/OG Tube 05/15/20 1630 Left nostril (Active)   Placement Check placement verified by aspirate characteristics;placement verified by distal tube length measurement;placement verified by x-ray 5/16/2020 11:05 AM   Tolerance no signs/symptoms of discomfort 5/16/2020 11:05 AM   Securement secured to nostril center w/ adhesive device 5/16/2020 11:05 AM   Clamp Status/Tolerance unclamped 5/16/2020 12:05 PM   Insertion Site Appearance no redness, warmth, tenderness, skin breakdown, drainage 5/16/2020 12:05 PM  "  Flush/Irrigation flushed w/;water;no resistance met 5/16/2020 12:05 PM   Feeding Type continuous 5/16/2020 12:05 PM   Feeding Action feeding restarted 5/16/2020 12:05 PM   Current Rate (mL/hr) 10 mL/hr 5/16/2020 12:05 PM   Goal Rate (mL/hr) 10 mL/hr 5/16/2020 12:05 PM   Intake (mL) 30 mL 5/16/2020 12:05 PM   Intake (mL) - Formula Tube Feeding 10 5/16/2020 12:05 PM   Residual Amount (ml) 0 ml 5/16/2020 12:05 PM            Urethral Catheter 05/16/20 1205 Straight-tip (Active)   Site Assessment Clean;Intact 5/16/2020 12:05 PM   Collection Container Urimeter 5/16/2020 12:05 PM   Securement Method secured to top of thigh w/ adhesive device 5/16/2020 12:05 PM   Catheter Care Performed yes 5/16/2020 12:05 PM   Reason for Continuing Urinary Catheterization Urinary retention 5/16/2020 12:05 PM   CAUTI Prevention Bundle StatLock in place w 1" slack;Intact seal between catheter & drainage tubing;Drainage bag/urimeter off the floor;Green sheeting clip in use;No dependent loops or kinks;Drainage bag/urimeter not overfilled (<2/3 full);Drainage bag/urimeter below bladder 5/16/2020 12:05 PM   Output (mL) 30 mL 5/16/2020 12:05 PM       Neurosurgery Physical Exam       E3V4M6. AOx2 (self, place). No distress.  CN II-XII intact except for R facial droop. Blind at baseline  FC on LUE/LLE with 5/5 strength  Paretic on RUE/RLE  Sensation intact      Significant Labs:  Recent Labs   Lab 05/16/20  0201 05/17/20  0137    112*    141   K 3.9 3.5    109   CO2 23 22*   BUN 13 15   CREATININE 0.8 0.8   CALCIUM 8.8 8.7   MG 2.0 2.0     Recent Labs   Lab 05/16/20  0201 05/17/20  0137   WBC 11.42 10.90   HGB 14.1 13.8*   HCT 45.2 43.8    237     No results for input(s): LABPT, INR, APTT in the last 48 hours.  Microbiology Results (last 7 days)     ** No results found for the last 168 hours. **        All pertinent labs from the last 24 hours have been reviewed.    Significant Diagnostics:  CT: No results found in the " last 24 hours.  MRI: No results found in the last 24 hours.

## 2020-05-17 NOTE — SUBJECTIVE & OBJECTIVE
Review of Systems  Unable to obtain a complete ROS due to level of consciousness.  Objective:     Vitals:  Temp: 98.9 °F (37.2 °C)  Pulse: 80  Rhythm: normal sinus rhythm  BP: (!) 174/80  MAP (mmHg): 97  Resp: 20  SpO2: 96 %  Oxygen Concentration (%): 24  O2 Device (Oxygen Therapy): nasal cannula    Temp  Min: 97.6 °F (36.4 °C)  Max: 98.9 °F (37.2 °C)  Pulse  Min: 79  Max: 106  BP  Min: 125/75  Max: 174/80  MAP (mmHg)  Min: 96  Max: 123  Resp  Min: 18  Max: 28  SpO2  Min: 95 %  Max: 98 %  Oxygen Concentration (%)  Min: 24  Max: 24    05/16 0701 - 05/17 0700  In: 2624.6 [I.V.:2194.6]  Out: 2600 [Urine:2600]   Unmeasured Output  Urine Occurrence: 0  Stool Occurrence: 0  Pad Count: 2       Physical Exam  GA:  comfortable, no acute distress.   HEENT: No scleral icterus or JVD.   Pulmonary: Clear to auscultation A/L.  Cardiac: RRR S1 & S2 w/o rubs/murmurs/gallops.   Abdominal: Bowel sounds present x 4. No appreciable hepatosplenomegaly.  Skin: No jaundice, rashes, or visible lesions.  Neuro:  --GCS: E4 V5 M6  --Mental Status:  Awake oriented X4, follows commands, R facial droop  --Pupils mm, PERRL.   --Corneal reflex, gag, cough intact.  --LUE spontaneous  --RUE some effort, however can not hold against gravity  --LLE spont  --RLE  Some effort against gravity    Medications:  Continuous  sodium chloride 0.9% Last Rate: 75 mL/hr at 05/17/20 0930   Scheduled  albuterol-ipratropium 3 mL Q6H   hydrALAZINE 50 mg Q8H   labetaloL 100 mg Q8H   losartan 50 mg Daily   polyethylene glycol 17 g Daily   senna-docusate 8.6-50 mg 1 tablet BID   silodosin 4 mg Daily   PRN  acetaminophen 650 mg Q4H PRN   hydrALAZINE 10 mg Q4H PRN   labetaloL 10 mg Q4H PRN   magnesium oxide 800 mg PRN   magnesium oxide 800 mg PRN   potassium chloride 10% 40 mEq PRN   potassium chloride 10% 40 mEq PRN   potassium chloride 10% 40 mEq PRN   potassium, sodium phosphates 2 packet PRN   potassium, sodium phosphates 2 packet PRN   potassium, sodium  phosphates 2 packet PRN   sodium chloride 0.9% 10 mL PRN     Today I personally reviewed pertinent medications, lines/drains/airways, imaging, laboratory results,     Diet  Diet NPO

## 2020-05-17 NOTE — PLAN OF CARE
POC reviewed with pt at 0430. Pt verbalized understanding. Questions and concerns addressed. No acute events overnight. Pt progressing toward goals. Will continue to monitor. See flowsheets for full assessment and VS info    CTH completed  Trickle feeds. Tolerating well  Cardene gtt titrated to maintain SBP < 160  NS @ 75 ml/hr  [place] in place  AM labs collected. Replacing Phos and K

## 2020-05-17 NOTE — PROGRESS NOTES
[facility]  Vascular Neurology  Comprehensive Stroke Center  Progress Note    Assessment/Plan:     Dysarthria  -2/2 ICH, continue SLP eval and treat    Hemiparesis, right  -2/2 ICH, continue PT eval and treat    Essential hypertension  -Stroke risk factor, likely etiology of ICH in this pt  -Goal SBP < 140, consider starting po anti-hypertensives as needed to wean nicardipine    Nontraumatic intracerebral hemorrhage  76 yo M with no known PMHx presents to [facility] 05/14/2020 transferred from OSH for large L thalamic and caudate ICH with intraventricular extension.  Of note, patient had SBP 240s documented at OSH.  Pt otherwise has no clear risk factors for ICH.  Does not appear to have cognitive deficits on exam, no reported concern for memory issues.  No medications, no EtOH use.  No recent trauma.  No unexplained weight loss or B symptoms.  Admitted to Neuro ICU w/ NSGY following.  Etiology likely hypertensive, rule out vascular malformation w/ further imaging.    -currently on cardene, goal changed to SBP < 160  -consider additional anti-hypertensives to wean    Antithrombotics for secondary stroke prevention:  None--ICH  Statins for secondary stroke prevention/HLD: LDL elevated--would not start statin acutely in setting of ICH, but could discuss low dose statin for reduction of stroke risk factors in stroke clinic follow up  Aggressive risk factor modification: HTN  Rehab efforts: PT/OT/SLP/PM&R  Diagnostics ordered/pending: CTA head to eval for vascular malformation  VTE prophylaxis: None: Reason for No Pharmacological VTE Prophylaxis: History of systemic or intracranial bleeding  BP parameters: ICH: SBP <140         05/14/2020:  Transfer[facility] L ICH/IVH from caudate and thalamus  05/15/2020:   Continues on cardene for SBP < 140, neurosurgery following.  05/16/2020:   Started back on cardene for SBP < 140 this AM    STROKE DOCUMENTATION   Acute Stroke Times   Last  Known Normal Time: 0300  Symptom Onset Date: 05/14/20  Symptom Onset Time: 0700  Stroke Team Called Time: 0806  Stroke Team Arrival Time: 0809  CT Interpretation Time: 0809  Decision to Treat Time for Alteplase: (Contraindicated 2/2 ICH)  Decision to Treat Time for IR: (no LVO)    NIH Scale:  1a. Level of Consciousness: 0--> alert  1b. LOC Questions: 0-->Answers both questions correctly  1c. LOC Commands: 0-->Performs both tasks correctly  2. Best Gaze: 0-->Normal  3. Visual: 1-->Partial hemianopia(Pt notes baseline poor vision in R eye on testing)  4. Facial Palsy: 1-->Minor paralysis (flattened nasolabial fold, asymmetry on smiling)  5a. Motor Arm, Left: 0-->No drift, limb holds 90 (or 45) degrees for full 10 secs  5b. Motor Arm, Right: 2-->some effort against gravity, limb falls  6a. Motor Leg, Left: 0-->No drift, leg holds 30 degree position for full 5 secs  6b. Motor Leg, Right: 2-->some effort against gravity, leg falls to bed immediately  7. Limb Ataxia: 0-->Absent  8. Sensory: 1-->Mild-to-moderate sensory loss, patient feels pinprick is less sharp or is dull on the affected side, or there is a loss of superficial pain with pinprick, but patient is aware of being touched  9. Best Language: 0-->No aphasia, normal  10. Dysarthria: 1-->mild-moderate dysarthria  11. Extinction and Inattention (formerly Neglect): 0-->No abnormality  Total (NIH Stroke Scale): 8    Modified Kina Score: 0  Abingdon Coma Scale:    ABCD2 Score:    [id number] Score:   HAS -BLED Score:   ICH Score:1  Hunt & Mccray Classification:      Hemorrhagic change of an Ischemic Stroke: Does this patient have an ischemic stroke with hemorrhagic changes? No     Neurologic Chief Complaint: L caudate/ thalamic ICH w/ IVH    Subjective:     Interval History: Patient is seen for follow-up neurological assessment and treatment recommendations: AMERICAON. Currently on Cardene this AM. Losartan added to help wean from cardene. Improved right movement on right  side.    HPI, Past Medical, Family, and Social History remains the same as documented in the initial encounter.     Review of Systems   Constitutional: Negative for chills and fever.   HENT: Negative for congestion and trouble swallowing.    Eyes: Negative for pain and visual disturbance.   Respiratory: Negative for cough and shortness of breath.    Cardiovascular: Negative for chest pain and palpitations.   Gastrointestinal: Negative for abdominal pain, constipation, diarrhea, nausea and vomiting.   Genitourinary: Negative for dysuria and flank pain.   Musculoskeletal: Negative for myalgias.   Neurological: Positive for speech difficulty and weakness. Negative for numbness and headaches.   Psychiatric/Behavioral: The patient is not nervous/anxious.      Scheduled Meds:   albuterol-ipratropium  3 mL Nebulization Q6H    hydrALAZINE  50 mg Per NG tube Q8H    losartan  50 mg Per NG tube Daily    senna-docusate 8.6-50 mg  1 tablet Per NG tube Daily    silodosin  4 mg Per NG tube Daily     Continuous Infusions:   sodium chloride 0.9% 75 mL/hr at 05/17/20 0805    niCARdipine 5 mg/hr (05/17/20 0805)     PRN Meds:acetaminophen, hydrALAZINE, labetaloL, magnesium oxide, magnesium oxide, potassium chloride 10%, potassium chloride 10%, potassium chloride 10%, potassium, sodium phosphates, potassium, sodium phosphates, potassium, sodium phosphates, sodium chloride 0.9%    Objective:     Vital Signs (Most Recent):  Temp: 98.9 °F (37.2 °C) (05/17/20 0705)  Pulse: 87 (05/17/20 0805)  Resp: 18 (05/17/20 0805)  BP: 139/70 (05/17/20 0805)  SpO2: 97 % (05/17/20 0805)  BP Location: Left arm    Vital Signs Range (Last 24H):  Temp:  [97.6 °F (36.4 °C)-98.9 °F (37.2 °C)]   Pulse:  []   Resp:  [18-28]   BP: (125-170)/(70-99)   SpO2:  [92 %-98 %]   Arterial Line BP: (123-169)/(50-87)   BP Location: Left arm    Physical Exam   Constitutional: He is oriented to person, place, and time. No distress.   Cardiovascular: Normal rate  and regular rhythm.   Pulmonary/Chest: Effort normal.   Abdominal: Soft.   Neurological: He is alert and oriented to person, place, and time.   Skin: Skin is warm. He is not diaphoretic.       Neurological Exam:   LOC: alert  Attention Span: Good   Language: No aphasia  Articulation: Dysarthria  Motor: Arm left  Normal 5/5  Leg left  Normal 5/5  Arm right  Paresis 1/5  Leg right Paresis 1/5  Cebellar: No evidence of appendicular or axial ataxia  Sensation: Intact to light touch, temperature and vibration    Laboratory:  CMP:   Recent Labs   Lab 20  013   CALCIUM 8.7   ALBUMIN 3.4*   PROT 6.5      K 3.5   CO2 22*      BUN 15   CREATININE 0.8   ALKPHOS 54*   ALT 11   AST 23   BILITOT 0.7     CBC:   Recent Labs   Lab 20   WBC 10.90   RBC 4.77   HGB 13.8*   HCT 43.8      MCV 92   MCH 28.9   MCHC 31.5*       Diagnostic Results     Brain imagin2020 CT head w/o contrast:      Redemonstration of an acute parenchymal hemorrhage centered within the left basal ganglia with intraventricular extension.  Persistent ventricular prominence, stable in size when compared to prior study, in keeping with evolving hydrocephalus.      Vessel Imaging:  None     Cardiac Evaluation:   None       Nani Aparicio MD  Comprehensive Stroke Center  Department of Vascular Neurology   [facility]

## 2020-05-17 NOTE — PROGRESS NOTES
[facility]  Neurosurgery  Progress Note    Subjective:     History of Present Illness: Brett Vega is a 75 y.o. male with no known PMH and no contact with healthcare who presents as transfer from [facility] for new onset of R hemiparesis and dysarthria. Patient believes onset was around 3 am. Wife awoke this morning and found him unable to more R side with slurred speech so called EMS. BP on arrival to OSH was 247/129. He was started on a cardene gtt. CTH revealed L caudate/thalamic ICH w/ IVH. NSGY consulted for evaluation. Upon my exam, pt has GCS 15, AAOx3 and FC on L side, hemiparetic on R with significant dysarthria. He is legally blind at baseline, denies any acute visual changes. Denies any other complaints. He takes no blood thinners or any other medications.      Post-Op Info:  * No surgery found *         Interval History: NAEON. AFVSS. Patient remains stable. No complaints at this time. No surgical intervention at present    No medications prior to admission.       Review of patient's allergies indicates:  No Known Allergies    History reviewed. No pertinent past medical history.  History reviewed. No pertinent surgical history.  Family History     None        Tobacco Use    Smoking status: Former Smoker    Smokeless tobacco: Never Used   Substance and Sexual Activity    Alcohol use: Not on file    Drug use: Not on file    Sexual activity: Not on file     Review of Systems    Objective:     Weight: 95.3 kg (210 lb)  Body mass index is 30.13 kg/m².  Vital Signs (Most Recent):  Temp: 98.9 °F (37.2 °C) (05/17/20 0705)  Pulse: 80 (05/17/20 0905)  Resp: 20 (05/17/20 0905)  BP: 138/70 (05/17/20 0905)  SpO2: 95 % (05/17/20 0905) Vital Signs (24h Range):  Temp:  [97.6 °F (36.4 °C)-98.9 °F (37.2 °C)] 98.9 °F (37.2 °C)  Pulse:  [] 80  Resp:  [18-28] 20  SpO2:  [95 %-98 %] 95 %  BP: (125-170)/(70-99) 138/70  Arterial Line BP: (123-169)/(50-83) 138/67     Date 05/17/20 0700 - 05/18/20  "0659   Shift 2408-8170 5874-1721 3824-6140 24 Hour Total   INTAKE   I.V.(mL/kg) 385.4(4)   385.4(4)   NG/GT 30   30   Shift Total(mL/kg) 415.4(4.4)   415.4(4.4)   OUTPUT   Urine(mL/kg/hr) 275   275   Shift Total(mL/kg) 275(2.9)   275(2.9)   Weight (kg) 95.3 95.3 95.3 95.3              Oxygen Concentration (%):  [24] 24         NG/OG Tube 05/15/20 1630 Left nostril (Active)   Placement Check placement verified by aspirate characteristics;placement verified by distal tube length measurement;placement verified by x-ray 5/16/2020 11:05 AM   Tolerance no signs/symptoms of discomfort 5/16/2020 11:05 AM   Securement secured to nostril center w/ adhesive device 5/16/2020 11:05 AM   Clamp Status/Tolerance unclamped 5/16/2020 12:05 PM   Insertion Site Appearance no redness, warmth, tenderness, skin breakdown, drainage 5/16/2020 12:05 PM   Flush/Irrigation flushed w/;water;no resistance met 5/16/2020 12:05 PM   Feeding Type continuous 5/16/2020 12:05 PM   Feeding Action feeding restarted 5/16/2020 12:05 PM   Current Rate (mL/hr) 10 mL/hr 5/16/2020 12:05 PM   Goal Rate (mL/hr) 10 mL/hr 5/16/2020 12:05 PM   Intake (mL) 30 mL 5/16/2020 12:05 PM   Intake (mL) - Formula Tube Feeding 10 5/16/2020 12:05 PM   Residual Amount (ml) 0 ml 5/16/2020 12:05 PM            Urethral Catheter 05/16/20 1205 Straight-tip (Active)   Site Assessment Clean;Intact 5/16/2020 12:05 PM   Collection Container Urimeter 5/16/2020 12:05 PM   Securement Method secured to top of thigh w/ adhesive device 5/16/2020 12:05 PM   Catheter Care Performed yes 5/16/2020 12:05 PM   Reason for Continuing Urinary Catheterization Urinary retention 5/16/2020 12:05 PM   CAUTI Prevention Bundle StatLock in place w 1" slack;Intact seal between catheter & drainage tubing;Drainage bag/urimeter off the floor;Green sheeting clip in use;No dependent loops or kinks;Drainage bag/urimeter not overfilled (<2/3 full);Drainage bag/urimeter below bladder 5/16/2020 12:05 PM   Output (mL) " 30 mL 5/16/2020 12:05 PM       Neurosurgery Physical Exam       [id number]. AOx2 (self, place). No distress.  CN II-XII intact except for R facial droop. Blind at baseline  FC on LUE/LLE with 5/5 strength  Paretic on RUE/RLE  Sensation intact      Significant Labs:  Recent Labs   Lab 05/16/20  0201 05/17/20  0137    112*    141   K 3.9 3.5    109   CO2 23 22*   BUN 13 15   CREATININE 0.8 0.8   CALCIUM 8.8 8.7   MG 2.0 2.0     Recent Labs   Lab 05/16/20  0201 05/17/20  0137   WBC 11.42 10.90   HGB 14.1 13.8*   HCT 45.2 43.8    237     No results for input(s): LABPT, INR, APTT in the last 48 hours.  Microbiology Results (last 7 days)     ** No results found for the last 168 hours. **        All pertinent labs from the last 24 hours have been reviewed.    Significant Diagnostics:  CT: No results found in the last 24 hours.  MRI: No results found in the last 24 hours.    Assessment/Plan:     Nontraumatic intracerebral hemorrhage   76 y/o M w/ no known PMH who presented with new onset RSW and dysarthria, found to have L caudate tail/thalamic ICH with IVH (ICH score 2):    --Patient admitted to [facility] on telemetry      -q1h neurochecks in ICU  --All labs and diagnostics reviewed  --CTH 5/14 shows L caudate/thalamic ICH with IVH in lateral ventricles (L>R), 3rd, and 4th ventricles. Mild prominence of ventricles but no overt hydrocephalus, stable on repeat CTH   - CT 5/17: stable  --No EVD or other surgical intervention at this time, will follow exam closely for any deterioration and FU repeat scan.  --No anti-plt/coag medications  --HTN: SBP <140 (continue cardene ggt; hydralazine & labetalol PRN) - [facility] managing  --Na >135  --HOB >30  --Full pre-op labs reviewed, coags WNL, T&S reviewed  --Covid test negative 5/14  --Advance nutrition per [facility]  --Continue to monitor clinically, notify NSGY immediately with any changes in neuro status  --Please call with any questions or concerns    Dispo: Per  [facility]        Loco Tucker MD  Neurosurgery  [facility]

## 2020-05-17 NOTE — SUBJECTIVE & OBJECTIVE
Neurologic Chief Complaint: L caudate/ thalamic ICH w/ IVH    Subjective:     Interval History: Patient is seen for follow-up neurological assessment and treatment recommendations: LORY. Currently on Cardene this AM. Losartan added to help wean from cardene. Improved right movement on right side.    HPI, Past Medical, Family, and Social History remains the same as documented in the initial encounter.     Review of Systems   Constitutional: Negative for chills and fever.   HENT: Negative for congestion and trouble swallowing.    Eyes: Negative for pain and visual disturbance.   Respiratory: Negative for cough and shortness of breath.    Cardiovascular: Negative for chest pain and palpitations.   Gastrointestinal: Negative for abdominal pain, constipation, diarrhea, nausea and vomiting.   Genitourinary: Negative for dysuria and flank pain.   Musculoskeletal: Negative for myalgias.   Neurological: Positive for speech difficulty and weakness. Negative for numbness and headaches.   Psychiatric/Behavioral: The patient is not nervous/anxious.      Scheduled Meds:   albuterol-ipratropium  3 mL Nebulization Q6H    hydrALAZINE  50 mg Per NG tube Q8H    losartan  50 mg Per NG tube Daily    senna-docusate 8.6-50 mg  1 tablet Per NG tube Daily    silodosin  4 mg Per NG tube Daily     Continuous Infusions:   sodium chloride 0.9% 75 mL/hr at 05/17/20 0805    niCARdipine 5 mg/hr (05/17/20 0805)     PRN Meds:acetaminophen, hydrALAZINE, labetaloL, magnesium oxide, magnesium oxide, potassium chloride 10%, potassium chloride 10%, potassium chloride 10%, potassium, sodium phosphates, potassium, sodium phosphates, potassium, sodium phosphates, sodium chloride 0.9%    Objective:     Vital Signs (Most Recent):  Temp: 98.9 °F (37.2 °C) (05/17/20 0705)  Pulse: 87 (05/17/20 0805)  Resp: 18 (05/17/20 0805)  BP: 139/70 (05/17/20 0805)  SpO2: 97 % (05/17/20 0805)  BP Location: Left arm    Vital Signs Range (Last 24H):  Temp:  [97.6 °F  (36.4 °C)-98.9 °F (37.2 °C)]   Pulse:  []   Resp:  [18-28]   BP: (125-170)/(70-99)   SpO2:  [92 %-98 %]   Arterial Line BP: (123-169)/(50-87)   BP Location: Left arm    Physical Exam   Constitutional: He is oriented to person, place, and time. No distress.   Cardiovascular: Normal rate and regular rhythm.   Pulmonary/Chest: Effort normal.   Abdominal: Soft.   Neurological: He is alert and oriented to person, place, and time.   Skin: Skin is warm. He is not diaphoretic.       Neurological Exam:   LOC: alert  Attention Span: Good   Language: No aphasia  Articulation: Dysarthria  Motor: Arm left  Normal 5/5  Leg left  Normal 5/5  Arm right  Plegia 0/5  Leg right Plegia 0/5  Cebellar: No evidence of appendicular or axial ataxia  Sensation: Intact to light touch, temperature and vibration    Laboratory:  CMP:   Recent Labs   Lab 20  0137   CALCIUM 8.7   ALBUMIN 3.4*   PROT 6.5      K 3.5   CO2 22*      BUN 15   CREATININE 0.8   ALKPHOS 54*   ALT 11   AST 23   BILITOT 0.7     CBC:   Recent Labs   Lab 20  0137   WBC 10.90   RBC 4.77   HGB 13.8*   HCT 43.8      MCV 92   MCH 28.9   MCHC 31.5*       Diagnostic Results     Brain imagin2020 CT head w/o contrast:      Redemonstration of an acute parenchymal hemorrhage centered within the left basal ganglia with intraventricular extension.  Persistent ventricular prominence, stable in size when compared to prior study, in keeping with evolving hydrocephalus.      Vessel Imaging:  None     Cardiac Evaluation:   None

## 2020-05-17 NOTE — ASSESSMENT & PLAN NOTE
-New onset of RSW and dysarthria in setting of /129 on presentation, no meds at home.  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   -Telemedicine-stroke done by Dr Looney  -Admitted to [facility]  -q1hr neurocheck  -SBP goal <160 On Cardene ggt, currently at goal  -Vascular neurology and STAT NSGY   -PT/INR, PTT   - EVD watch, sbp < 160, PT/OT  5/16/2020:  Increase hydralazine, add losartan, initiated TF, follow CTH tomorrow morning, place cordova cath and start silodosin, replace lytes  5/17/2020: CTH stable

## 2020-05-18 LAB
ALBUMIN SERPL BCP-MCNC: 3.1 G/DL (ref 3.5–5.2)
ALP SERPL-CCNC: 48 U/L (ref 55–135)
ALT SERPL W/O P-5'-P-CCNC: 15 U/L (ref 10–44)
ANION GAP SERPL CALC-SCNC: 9 MMOL/L (ref 8–16)
AST SERPL-CCNC: 20 U/L (ref 10–40)
BASOPHILS # BLD AUTO: 0.02 K/UL (ref 0–0.2)
BASOPHILS NFR BLD: 0.2 % (ref 0–1.9)
BILIRUB SERPL-MCNC: 0.5 MG/DL (ref 0.1–1)
BUN SERPL-MCNC: 24 MG/DL (ref 8–23)
CALCIUM SERPL-MCNC: 8.6 MG/DL (ref 8.7–10.5)
CHLORIDE SERPL-SCNC: 111 MMOL/L (ref 95–110)
CO2 SERPL-SCNC: 22 MMOL/L (ref 23–29)
CREAT SERPL-MCNC: 0.8 MG/DL (ref 0.5–1.4)
DIFFERENTIAL METHOD: ABNORMAL
EOSINOPHIL # BLD AUTO: 0 K/UL (ref 0–0.5)
EOSINOPHIL NFR BLD: 0.3 % (ref 0–8)
ERYTHROCYTE [DISTWIDTH] IN BLOOD BY AUTOMATED COUNT: 13.5 % (ref 11.5–14.5)
EST. GFR  (AFRICAN AMERICAN): >60 ML/MIN/1.73 M^2
EST. GFR  (NON AFRICAN AMERICAN): >60 ML/MIN/1.73 M^2
GLUCOSE SERPL-MCNC: 134 MG/DL (ref 70–110)
HCT VFR BLD AUTO: 40.4 % (ref 40–54)
HGB BLD-MCNC: 12.8 G/DL (ref 14–18)
IMM GRANULOCYTES # BLD AUTO: 0.03 K/UL (ref 0–0.04)
IMM GRANULOCYTES NFR BLD AUTO: 0.3 % (ref 0–0.5)
LYMPHOCYTES # BLD AUTO: 0.9 K/UL (ref 1–4.8)
LYMPHOCYTES NFR BLD: 10.3 % (ref 18–48)
MAGNESIUM SERPL-MCNC: 2.1 MG/DL (ref 1.6–2.6)
MCH RBC QN AUTO: 28.7 PG (ref 27–31)
MCHC RBC AUTO-ENTMCNC: 31.7 G/DL (ref 32–36)
MCV RBC AUTO: 91 FL (ref 82–98)
MONOCYTES # BLD AUTO: 0.8 K/UL (ref 0.3–1)
MONOCYTES NFR BLD: 9.6 % (ref 4–15)
NEUTROPHILS # BLD AUTO: 6.9 K/UL (ref 1.8–7.7)
NEUTROPHILS NFR BLD: 79.3 % (ref 38–73)
NRBC BLD-RTO: 0 /100 WBC
PHOSPHATE SERPL-MCNC: 2.6 MG/DL (ref 2.7–4.5)
PLATELET # BLD AUTO: 226 K/UL (ref 150–350)
PMV BLD AUTO: 9.9 FL (ref 9.2–12.9)
POTASSIUM SERPL-SCNC: 4 MMOL/L (ref 3.5–5.1)
PROT SERPL-MCNC: 5.9 G/DL (ref 6–8.4)
RBC # BLD AUTO: 4.46 M/UL (ref 4.6–6.2)
SODIUM SERPL-SCNC: 142 MMOL/L (ref 136–145)
WBC # BLD AUTO: 8.65 K/UL (ref 3.9–12.7)

## 2020-05-18 PROCEDURE — 80053 COMPREHEN METABOLIC PANEL: CPT

## 2020-05-18 PROCEDURE — 99233 SBSQ HOSP IP/OBS HIGH 50: CPT | Mod: GC,,, | Performed by: PSYCHIATRY & NEUROLOGY

## 2020-05-18 PROCEDURE — 25000003 PHARM REV CODE 250: Performed by: NURSE PRACTITIONER

## 2020-05-18 PROCEDURE — 99232 SBSQ HOSP IP/OBS MODERATE 35: CPT | Mod: GC,,, | Performed by: NEUROLOGICAL SURGERY

## 2020-05-18 PROCEDURE — 83735 ASSAY OF MAGNESIUM: CPT

## 2020-05-18 PROCEDURE — 85025 COMPLETE CBC W/AUTO DIFF WBC: CPT

## 2020-05-18 PROCEDURE — 25000003 PHARM REV CODE 250: Performed by: PSYCHIATRY & NEUROLOGY

## 2020-05-18 PROCEDURE — 99233 PR SUBSEQUENT HOSPITAL CARE,LEVL III: ICD-10-PCS | Mod: GC,,, | Performed by: PSYCHIATRY & NEUROLOGY

## 2020-05-18 PROCEDURE — 99900035 HC TECH TIME PER 15 MIN (STAT)

## 2020-05-18 PROCEDURE — 94640 AIRWAY INHALATION TREATMENT: CPT

## 2020-05-18 PROCEDURE — 99232 PR SUBSEQUENT HOSPITAL CARE,LEVL II: ICD-10-PCS | Mod: GC,,, | Performed by: NEUROLOGICAL SURGERY

## 2020-05-18 PROCEDURE — 63600175 PHARM REV CODE 636 W HCPCS: Performed by: PSYCHIATRY & NEUROLOGY

## 2020-05-18 PROCEDURE — 97530 THERAPEUTIC ACTIVITIES: CPT

## 2020-05-18 PROCEDURE — 27000221 HC OXYGEN, UP TO 24 HOURS

## 2020-05-18 PROCEDURE — 97110 THERAPEUTIC EXERCISES: CPT

## 2020-05-18 PROCEDURE — 25000242 PHARM REV CODE 250 ALT 637 W/ HCPCS: Performed by: NURSE PRACTITIONER

## 2020-05-18 PROCEDURE — 63600175 PHARM REV CODE 636 W HCPCS: Performed by: NURSE PRACTITIONER

## 2020-05-18 PROCEDURE — 92526 ORAL FUNCTION THERAPY: CPT

## 2020-05-18 PROCEDURE — 97535 SELF CARE MNGMENT TRAINING: CPT

## 2020-05-18 PROCEDURE — 84100 ASSAY OF PHOSPHORUS: CPT

## 2020-05-18 PROCEDURE — 20000000 HC ICU ROOM

## 2020-05-18 PROCEDURE — 94761 N-INVAS EAR/PLS OXIMETRY MLT: CPT

## 2020-05-18 PROCEDURE — 92507 TX SP LANG VOICE COMM INDIV: CPT

## 2020-05-18 PROCEDURE — 97112 NEUROMUSCULAR REEDUCATION: CPT

## 2020-05-18 RX ORDER — IPRATROPIUM BROMIDE AND ALBUTEROL SULFATE 2.5; .5 MG/3ML; MG/3ML
3 SOLUTION RESPIRATORY (INHALATION) EVERY 4 HOURS PRN
Status: DISCONTINUED | OUTPATIENT
Start: 2020-05-18 | End: 2020-05-26 | Stop reason: HOSPADM

## 2020-05-18 RX ORDER — HYDRALAZINE HYDROCHLORIDE 50 MG/1
50 TABLET, FILM COATED ORAL EVERY 6 HOURS
Status: DISCONTINUED | OUTPATIENT
Start: 2020-05-18 | End: 2020-05-18

## 2020-05-18 RX ORDER — LOSARTAN POTASSIUM 25 MG/1
50 TABLET ORAL DAILY
Status: DISCONTINUED | OUTPATIENT
Start: 2020-05-19 | End: 2020-05-19

## 2020-05-18 RX ORDER — POLYETHYLENE GLYCOL 3350 17 G/17G
17 POWDER, FOR SOLUTION ORAL DAILY
Status: DISCONTINUED | OUTPATIENT
Start: 2020-05-19 | End: 2020-05-26 | Stop reason: HOSPADM

## 2020-05-18 RX ORDER — ENOXAPARIN SODIUM 100 MG/ML
40 INJECTION SUBCUTANEOUS EVERY 24 HOURS
Status: DISCONTINUED | OUTPATIENT
Start: 2020-05-18 | End: 2020-05-26 | Stop reason: HOSPADM

## 2020-05-18 RX ORDER — LABETALOL 100 MG/1
100 TABLET, FILM COATED ORAL EVERY 8 HOURS
Status: DISCONTINUED | OUTPATIENT
Start: 2020-05-18 | End: 2020-05-20

## 2020-05-18 RX ORDER — SILODOSIN 4 MG/1
4 CAPSULE ORAL DAILY
Status: DISCONTINUED | OUTPATIENT
Start: 2020-05-19 | End: 2020-05-26 | Stop reason: HOSPADM

## 2020-05-18 RX ORDER — AMOXICILLIN 250 MG
1 CAPSULE ORAL 2 TIMES DAILY
Status: DISCONTINUED | OUTPATIENT
Start: 2020-05-18 | End: 2020-05-26 | Stop reason: HOSPADM

## 2020-05-18 RX ORDER — HYDRALAZINE HYDROCHLORIDE 50 MG/1
50 TABLET, FILM COATED ORAL EVERY 6 HOURS
Status: DISCONTINUED | OUTPATIENT
Start: 2020-05-18 | End: 2020-05-19

## 2020-05-18 RX ADMIN — IPRATROPIUM BROMIDE AND ALBUTEROL SULFATE 3 ML: .5; 3 SOLUTION RESPIRATORY (INHALATION) at 07:05

## 2020-05-18 RX ADMIN — SILODOSIN 4 MG: 4 CAPSULE ORAL at 09:05

## 2020-05-18 RX ADMIN — HYDRALAZINE HYDROCHLORIDE 50 MG: 50 TABLET, FILM COATED ORAL at 06:05

## 2020-05-18 RX ADMIN — IPRATROPIUM BROMIDE AND ALBUTEROL SULFATE 3 ML: .5; 3 SOLUTION RESPIRATORY (INHALATION) at 12:05

## 2020-05-18 RX ADMIN — ENOXAPARIN SODIUM 40 MG: 100 INJECTION SUBCUTANEOUS at 05:05

## 2020-05-18 RX ADMIN — HYDRALAZINE HYDROCHLORIDE 50 MG: 50 TABLET, FILM COATED ORAL at 11:05

## 2020-05-18 RX ADMIN — HYDRALAZINE HYDROCHLORIDE 10 MG: 20 INJECTION INTRAMUSCULAR; INTRAVENOUS at 04:05

## 2020-05-18 RX ADMIN — LABETALOL HYDROCHLORIDE 100 MG: 100 TABLET, FILM COATED ORAL at 09:05

## 2020-05-18 RX ADMIN — LABETALOL HYDROCHLORIDE 100 MG: 100 TABLET, FILM COATED ORAL at 02:05

## 2020-05-18 RX ADMIN — POTASSIUM & SODIUM PHOSPHATES POWDER PACK 280-160-250 MG 2 PACKET: 280-160-250 PACK at 09:05

## 2020-05-18 RX ADMIN — LABETALOL HYDROCHLORIDE 100 MG: 100 TABLET, FILM COATED ORAL at 06:05

## 2020-05-18 RX ADMIN — LOSARTAN POTASSIUM 50 MG: 25 TABLET ORAL at 09:05

## 2020-05-18 RX ADMIN — HYDRALAZINE HYDROCHLORIDE 50 MG: 50 TABLET, FILM COATED ORAL at 05:05

## 2020-05-18 RX ADMIN — SODIUM CHLORIDE: 0.9 INJECTION, SOLUTION INTRAVENOUS at 11:05

## 2020-05-18 RX ADMIN — HYDRALAZINE HYDROCHLORIDE 10 MG: 20 INJECTION INTRAMUSCULAR; INTRAVENOUS at 10:05

## 2020-05-18 NOTE — PT/OT/SLP PROGRESS
Speech Language Pathology Treatment    Patient Name:  Brett Vega   MRN:  [id number]  Admitting Diagnosis: <principal problem not specified>    Recommendations:                 General Recommendations:  Dysphagia therapy and Speech/language therapy  Diet recommendations:  Puree, Liquid Diet Level: Honey Thick   Aspiration Precautions: 1 bite/sip at a time, Feed only when awake/alert, HOB to 90 degrees, Meds crushed in puree and Small bites/sips   General Precautions: Standard, aspiration, fall  Communication strategies:  speech strategies    Subjective     Awake/alert    Pain/Comfort:  · Pain Rating 1: 0/10  · Pain Rating Post-Intervention 1: 0/10    Objective:     Has the patient been evaluated by SLP for swallowing?   Yes  Keep patient NPO? No   Current Respiratory Status: room air      Pt repositioned upright in bed for PO trials. He tolerated honey thick liquids x4 oz and puree x5 with timely swallow initiation and no overt clinical signs of airway compromise. Pt's spO2 remained at 97% throughout trials. Armando hernandez tolerated x2 with prolonged mastication and increased WOB post swallow. Increased intelligibility noted from previous session. Pt was ~85% intelligible and required min cues to utilize strategies. SLP provided ongoing education to pt on OME and speech strategies. Pt verbalized understanding and completed OME x5 reps each. Recommend honey thick liquids/pureed diet at this time. SLP will follow up.    Assessment:     Brett Vega is a 75 y.o. male with an SLP diagnosis of Dysphagia.    Goals:   Multidisciplinary Problems     SLP Goals        Problem: SLP Goal    Goal Priority Disciplines Outcome   SLP Goal     SLP Ongoing, Progressing   Description:  Speech Language Pathology Goals  Goals expected to be met by 5/21    1. Pt will participate in ongoing swallow assessment  2. Pt will utilize speech strategies with min cues  3. Pt will follow complex commands with 50% accy and min cues  4. Pt  will complete problem solving task with 80% accy and min cues                            Plan:     · Patient to be seen:  5 x/week   · Plan of Care reviewed with:  patient   · SLP Follow-Up:  Yes       Discharge recommendations:  rehabilitation facility     Time Tracking:     SLP Treatment Date:   05/18/20  Speech Start Time:  0838  Speech Stop Time:  0853     Speech Total Time (min):  15 min    Billable Minutes: Speech Therapy Individual 7 and Treatment Swallowing Dysfunction 8    Johnna Duran CCC-SLP  05/18/2020

## 2020-05-18 NOTE — PLAN OF CARE
Problem: Physical Therapy Goal  Goal: Physical Therapy Goal  Description  PT goals until 5/28/20    1. Pt supine to sit with minimal assist-not met  2. Pt sit to supine with minimal assist-not met  3. Pt sit to stand with hemiwalker with minimal assist-not met  4. Pt to perform gait 5ft with hemiwalker with max assist.-not met  5. Pt to transfer bed to/from bedside chair with hemiwalker with moderate assist.-not met  6. Pt to perform B LE exs in sitting or supine x 15 reps to strengthen B LE to improve functional mobility.-not met  7. Pt to sit on the EOB ~ 10 min with CGA to allow pt to perform functional activities-not met    Outcome: Ongoing, Progressing   Pt's goals remain appropriate and pt will continue to benefit from skilled PT services to work towards improved functional mobility including: bed mobility, transfers, and gait.   Marissa Guajardo, PT  5/18/2020

## 2020-05-18 NOTE — PLAN OF CARE
Goals remain appropriate. DELLA Evans 5/18/2020   Problem: Occupational Therapy Goal  Goal: Occupational Therapy Goal  Description  Goals to be met by: 5/29/2020     Patient will increase functional independence with ADLs by performing:    Grooming while EOB with Minimal Assistance.  Toileting from bedside commode with Moderate Assistance for hygiene and clothing management.   Sitting at edge of bed x10 minutes with Minimal Assistance in preparation of functional seated grooming tasks   Stand pivot transfers with Moderate Assistance.  Toilet transfer to bedside commode with Moderate Assistance.     Outcome: Ongoing, Progressing

## 2020-05-18 NOTE — PLAN OF CARE
Patient is not medically ready for stepdown at this time.   [facility]     05/18/20 1556   Discharge Reassessment   Assessment Type Discharge Planning Reassessment   Discharge Plan A Rehab   Discharge Plan B Skilled Nursing Facility   DME Needed Upon Discharge    (TBD)   Anticipated Discharge Disposition Rehab   Post-Acute Status   Post-Acute Authorization Placement   Post-Acute Placement Status Awaiting Internal Medical Clearance

## 2020-05-18 NOTE — ASSESSMENT & PLAN NOTE
-New onset of RSW and dysarthria in setting of /129 on presentation, no meds at home.  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   -Telemedicine-stroke done by Dr Looney  -Admitted to [facility]  -q1hr neurocheck  -SBP goal <160 On Cardene ggt, currently at goal  -Vascular neurology and STAT NSGY   -PT/INR, PTT   - EVD watch, sbp < 160, PT/OT  5/16/2020:  Increase hydralazine, add losartan, initiated TF, follow CTH tomorrow morning, place cordova cath and start silodosin, replace lytes  5/18/2020: CTH stable, off cardene, po hydralizine adjusted to q6 hrs

## 2020-05-18 NOTE — PROGRESS NOTES
[facility]  Neurocritical Care  Progress Note    Admit Date: 5/14/2020  Service Date: 05/18/2020  Length of Stay: 4    Subjective:     Chief Complaint: <principal problem not specified>    History of Present Illness: 74 yo male with no PMHX and no contact with health care his entire adult life transferred from [facility] after he presented there for new onset of dense right hemiparesis and marked dysarthria. Onset unclear but likely around 3am this morning. No recent illness. Legally blind. BP on arrival to outside facility 247/129. On Cardene ggt. 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 Telemedicine-stroke done by Dr Looney.  Currently pt awake, alert, oriented, following commands. No movement against gravity on the right side. On Cardene ggt. STAT consult to NSGY. Awaiting recs.     Hospital Course: 05/15/2020: SBP < 160, add hydralazine, PT/OT, evd watch  5/16/2020: increase Hydralazine, add losartan, start tube feeds, follow up CTH in AM, add silodosin and place cordova catheter, replace electrolytes PRN  5/17/2020: CT head stable, advance TF, add labetalol 100 q8hr, d/c cardene gtt  5/18/2020: required one prn dose of labetalol at 3 AM, scheduled hydralizine changed to q6 hrs, trial of puree consistency today although I do not feel patients swallowing is at point where can eat unattended or large quantities of food        Review of Systems   Constitutional: Negative for chills.   HENT: Positive for trouble swallowing and voice change.    Eyes: Negative for visual disturbance.   Respiratory: Negative for chest tightness.    Cardiovascular: Negative for chest pain.   Gastrointestinal: Negative for abdominal pain.   Genitourinary: Positive for difficulty urinating.   Musculoskeletal: Positive for neck stiffness.   Skin: Negative.    Neurological: Positive for facial asymmetry, speech difficulty and weakness.    Hematological: Negative.    Psychiatric/Behavioral: Negative.        Objective:     Vitals:  Temp: 99 °F (37.2 °C)  Pulse: 82  Rhythm: normal sinus rhythm  BP: (!) 154/88  MAP (mmHg): 102  Resp: (!) 21  SpO2: 95 %  O2 Device (Oxygen Therapy): nasal cannula    Temp  Min: 98.5 °F (36.9 °C)  Max: 99 °F (37.2 °C)  Pulse  Min: 73  Max: 109  BP  Min: 122/67  Max: 174/80  MAP (mmHg)  Min: 89  Max: 117  Resp  Min: 14  Max: 28  SpO2  Min: 94 %  Max: 97 %    05/17 0701 - 05/18 0700  In: 2842.2 [I.V.:1767.2]  Out: 1265 [Urine:1265]   Unmeasured Output  Urine Occurrence: 0  Stool Occurrence: 1  Emesis Occurrence: 0  Pad Count: 2       Physical Exam   Constitutional: He appears well-developed.   HENT:   Head: Normocephalic.   Eyes: Pupils are equal, round, and reactive to light.   Neck: No JVD present.   Cardiovascular: Normal heart sounds.   Pulmonary/Chest: Breath sounds normal.   Abdominal: Soft. Bowel sounds are normal.   Musculoskeletal: He exhibits edema.   Neurological: He is alert.   Significantly dysarthric  Right leg slightly stronger then right arm, distal UE strength more intact  Right hemisensory deficit   Skin: Skin is warm. Capillary refill takes less than 2 seconds.   Psychiatric: He has a normal mood and affect.       Medications:  Continuous  sodium chloride 0.9% Last Rate: 100 mL/hr at 05/18/20 1141   Scheduled  hydrALAZINE 50 mg Q6H   labetaloL 100 mg Q8H   [START ON 5/19/2020] losartan 50 mg Daily   [START ON 5/19/2020] polyethylene glycol 17 g Daily   senna-docusate 8.6-50 mg 1 tablet BID   [START ON 5/19/2020] silodosin 4 mg Daily   PRN  acetaminophen 650 mg Q4H PRN   albuterol-ipratropium 3 mL Q4H PRN   hydrALAZINE 10 mg Q4H PRN   labetaloL 10 mg Q4H PRN   magnesium oxide 800 mg PRN   magnesium oxide 800 mg PRN   potassium chloride 10% 40 mEq PRN   potassium chloride 10% 40 mEq PRN   potassium chloride 10% 40 mEq PRN   potassium, sodium phosphates 2 packet PRN   potassium, sodium phosphates 2 packet PRN    potassium, sodium phosphates 2 packet PRN   sodium chloride 0.9% 10 mL PRN     Today I personally reviewed pertinent medications, lines/drains/airways, imaging, laboratory results, notably:    Diet  Diet Dysphagia Pureed (IDDSI Level 4) Honey Thick  Diet Dysphagia Pureed (IDDSI Level 4) Honey Thick        Assessment/Plan:     Neuro  Dysarthria  SLP eval  05/18: gradual escalation of diet with close monitoring, would have low threshold for peg    Basal ganglia hemorrhage  IV extension but no EVD required    Nontraumatic intracerebral hemorrhage  -New onset of RSW and dysarthria in setting of /129 on presentation, no meds at home.  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   -Telemedicine-stroke done by Dr Looney  -Admitted to [facility]  -q1hr neurocheck  -SBP goal <160 On Cardene ggt, currently at goal  -Vascular neurology and STAT NSGY   -PT/INR, PTT   - EVD watch, sbp < 160, PT/OT  5/16/2020:  Increase hydralazine, add losartan, initiated TF, follow CTH tomorrow morning, place cordova cath and start silodosin, replace lytes  5/18/2020: CTH stable, off cardene, po hydralizine adjusted to q6 hrs          The patient is being Prophylaxed for:  Venous Thromboembolism with: Chemical  Stress Ulcer with: None  Ventilator Pneumonia with: not applicable    Activity Orders          Diet Dysphagia Pureed (IDDSI Level 4) Honey Thick: Dysphagia 1 (Pureed) starting at 05/18 1138    Commode at bedside starting at 05/14 1056        Full Code    Julito Collazo MD  Neurocritical Care  [facility]

## 2020-05-18 NOTE — PLAN OF CARE
POC reviewed with pt and family at 1400. Pt verbalized understanding. Questions and concerns addressed. No acute events today. Tube feeds continuing towards goal of 50. NS at 75/hr. Pt progressing toward goals. Will continue to monitor. See flowsheets for full assessment and VS info.

## 2020-05-18 NOTE — SUBJECTIVE & OBJECTIVE
Interval History: 5/18: MIRANDA HENLEY, Exam remains stable, no surgical intervention    No medications prior to admission.       Review of patient's allergies indicates:  No Known Allergies    History reviewed. No pertinent past medical history.  History reviewed. No pertinent surgical history.  Family History     None        Tobacco Use    Smoking status: Former Smoker    Smokeless tobacco: Never Used   Substance and Sexual Activity    Alcohol use: Not on file    Drug use: Not on file    Sexual activity: Not on file     Review of Systems    Objective:     Weight: 95.3 kg (210 lb)  Body mass index is 30.13 kg/m².  Vital Signs (Most Recent):  Temp: 98.6 °F (37 °C) (05/18/20 0305)  Pulse: 88 (05/18/20 0611)  Resp: 20 (05/18/20 0605)  BP: (!) 160/74 (05/18/20 0611)  SpO2: 95 % (05/18/20 0605) Vital Signs (24h Range):  Temp:  [98.2 °F (36.8 °C)-98.9 °F (37.2 °C)] 98.6 °F (37 °C)  Pulse:  [] 88  Resp:  [13-23] 20  SpO2:  [94 %-97 %] 95 %  BP: (125-174)/(58-96) 160/74  Arterial Line BP: (110-159)/(49-84) 154/72                Oxygen Concentration (%):  [24] 24         NG/OG Tube 05/15/20 1630 Left nostril (Active)   Placement Check placement verified by aspirate characteristics;placement verified by distal tube length measurement;placement verified by x-ray 5/16/2020 11:05 AM   Tolerance no signs/symptoms of discomfort 5/16/2020 11:05 AM   Securement secured to nostril center w/ adhesive device 5/16/2020 11:05 AM   Clamp Status/Tolerance unclamped 5/16/2020 12:05 PM   Insertion Site Appearance no redness, warmth, tenderness, skin breakdown, drainage 5/16/2020 12:05 PM   Flush/Irrigation flushed w/;water;no resistance met 5/16/2020 12:05 PM   Feeding Type continuous 5/16/2020 12:05 PM   Feeding Action feeding restarted 5/16/2020 12:05 PM   Current Rate (mL/hr) 10 mL/hr 5/16/2020 12:05 PM   Goal Rate (mL/hr) 10 mL/hr 5/16/2020 12:05 PM   Intake (mL) 30 mL 5/16/2020 12:05 PM   Intake (mL) - Formula Tube Feeding 10  "5/16/2020 12:05 PM   Residual Amount (ml) 0 ml 5/16/2020 12:05 PM            Urethral Catheter 05/16/20 1205 Straight-tip (Active)   Site Assessment Clean;Intact 5/16/2020 12:05 PM   Collection Container Urimeter 5/16/2020 12:05 PM   Securement Method secured to top of thigh w/ adhesive device 5/16/2020 12:05 PM   Catheter Care Performed yes 5/16/2020 12:05 PM   Reason for Continuing Urinary Catheterization Urinary retention 5/16/2020 12:05 PM   CAUTI Prevention Bundle StatLock in place w 1" slack;Intact seal between catheter & drainage tubing;Drainage bag/urimeter off the floor;Green sheeting clip in use;No dependent loops or kinks;Drainage bag/urimeter not overfilled (<2/3 full);Drainage bag/urimeter below bladder 5/16/2020 12:05 PM   Output (mL) 30 mL 5/16/2020 12:05 PM       Neurosurgery Physical Exam  [id number]. AOx2 (self, place). No distress.  CN II-XII intact except for R facial droop. Blind at baseline  FC on LUE/LLE with 5/5 strength  Paretic on RUE/RLE  Sensation intact      Significant Labs:  Recent Labs   Lab 05/17/20  0137 05/17/20  1239 05/18/20  0005   *  --  134*     --  142   K 3.5 4.0 4.0     --  111*   CO2 22*  --  22*   BUN 15  --  24*   CREATININE 0.8  --  0.8   CALCIUM 8.7  --  8.6*   MG 2.0  --  2.1     Recent Labs   Lab 05/17/20  0137 05/18/20  0005   WBC 10.90 8.65   HGB 13.8* 12.8*   HCT 43.8 40.4    226     No results for input(s): LABPT, INR, APTT in the last 48 hours.  Microbiology Results (last 7 days)     ** No results found for the last 168 hours. **        All pertinent labs from the last 24 hours have been reviewed.    Significant Diagnostics:  CT: No results found in the last 24 hours.  MRI: No results found in the last 24 hours.  "

## 2020-05-18 NOTE — ASSESSMENT & PLAN NOTE
-Stroke risk factor, likely etiology of ICH in this pt  -Goal SBP < 140, off Cardene gtt, now on Losartan and hydralazine.

## 2020-05-18 NOTE — ASSESSMENT & PLAN NOTE
SLP eval  05/18: gradual escalation of diet with close monitoring, would have low threshold for peg

## 2020-05-18 NOTE — PLAN OF CARE
POC reviewed with pt at 1750. Pt verbalized understanding. Questions and concerns addressed. No acute events today. Neuro exam stable today. SBP <160 with PRN hydralazine. SpO2 >92% on RA. NGT in place, TF turned off. Puree diet started today, honey thickened liquid tolerated well. Pt with very little appetite. [place] in place. A-jorge dc'd today. Pt progressing toward goals. Will continue to monitor. See flowsheets for full assessment and VS info.

## 2020-05-18 NOTE — ASSESSMENT & PLAN NOTE
74 yo M with no known PMHx presents to [facility] 05/14/2020 transferred from OSH for large L thalamic and caudate ICH with intraventricular extension.  Of note, patient had SBP 240s documented at OSH.  Pt otherwise has no clear risk factors for ICH.  Does not appear to have cognitive deficits on exam, no reported concern for memory issues.  No medications, no EtOH use.  No recent trauma.  No unexplained weight loss or B symptoms.  Admitted to Neuro ICU w/ NSGY following.  Etiology likely hypertensive, rule out vascular malformation w/ further imaging.    - repeat CT Head in 5/17: stable without evidence for significant interval detrimental change.  -Off Cardene gtt, now on Losartan, Labetolol and Hydralazine.   -continue to monitor in NCC    Antithrombotics for secondary stroke prevention:  None--ICH  Statins for secondary stroke prevention/HLD: LDL elevated--would not start statin acutely in setting of ICH, but could discuss low dose statin for reduction of stroke risk factors in stroke clinic follow up  Aggressive risk factor modification: HTN  Rehab efforts: PT/OT/SLP/PM&R  Diagnostics ordered/pending: CTA head to eval for vascular malformation  VTE prophylaxis: None: Reason for No Pharmacological VTE Prophylaxis: History of systemic or intracranial bleeding  BP parameters: ICH: SBP <140

## 2020-05-18 NOTE — PROGRESS NOTES
[facility]  Vascular Neurology  Comprehensive Stroke Center  Progress Note    Assessment/Plan:     Dysarthria  -2/2 ICH, continue SLP eval and treat    Hemiparesis, right  -2/2 ICH, continue PT eval and treat    Essential hypertension  -Stroke risk factor, likely etiology of ICH in this pt  -Goal SBP < 140, off Cardene gtt, now on Losartan and hydralazine.    Nontraumatic intracerebral hemorrhage  76 yo M with no known PMHx presents to [facility] 05/14/2020 transferred from OSH for large L thalamic and caudate ICH with intraventricular extension.  Of note, patient had SBP 240s documented at OSH.  Pt otherwise has no clear risk factors for ICH.  Does not appear to have cognitive deficits on exam, no reported concern for memory issues.  No medications, no EtOH use.  No recent trauma.  No unexplained weight loss or B symptoms.  Admitted to Neuro ICU w/ NSGY following.  Etiology likely hypertensive, rule out vascular malformation w/ further imaging.    - repeat CT Head in 5/17: stable without evidence for significant interval detrimental change.  - Echo showed normal EF, no reported vegetations or thrombus reported.  -Off Cardene gtt, now on Losartan, Labetolol and Hydralazine.   -continue to monitor in [facility]    Antithrombotics for secondary stroke prevention:  None--ICH  Statins for secondary stroke prevention/HLD: LDL elevated--would not start statin acutely in setting of ICH, but could discuss low dose statin for reduction of stroke risk factors in stroke clinic follow up  Aggressive risk factor modification: HTN  Rehab efforts: PT/OT/SLP/PM&R  Diagnostics ordered/pending: CTA head to eval for vascular malformation  VTE prophylaxis: None: Reason for No Pharmacological VTE Prophylaxis: History of systemic or intracranial bleeding  BP parameters: ICH: SBP <140         05/14/2020:  Transferred to [facility] from [facility] for L ICH/IVH from caudate and thalamus  05/15/2020:   Continues on cardene for SBP <  140, neurosurgery following.  05/16/2020:   Started back on cardene for SBP < 140 this AM  05/18/2020:  Off Cardene drip, placed on Losartan and Hydralazine.     STROKE DOCUMENTATION   Acute Stroke Times   Last Known Normal Time: 0300  Symptom Onset Date: 05/14/20  Symptom Onset Time: 0700  Stroke Team Called Time: 0806  Stroke Team Arrival Time: 0809  CT Interpretation Time: 0809  Decision to Treat Time for Alteplase: (Contraindicated 2/2 ICH)  Decision to Treat Time for IR: (no LVO)    NIH Scale:  1a. Level of Consciousness: 0-->Alert, keenly responsive  1b. LOC Questions: 0-->Answers both questions correctly  1c. LOC Commands: 0-->Performs both tasks correctly  2. Best Gaze: 0-->Normal  3. Visual: 0-->No visual loss  4. Facial Palsy: 1-->Minor paralysis (flattened nasolabial fold, asymmetry on smiling)  5a. Motor Arm, Left: 0-->No drift, limb holds 90 (or 45) degrees for full 10 secs  5b. Motor Arm, Right: 2-->Some effort against gravity, limb cannot get to or maintain (if cued) 90 (or 45) degrees, drifts down to bed, but has some effort against gravity  6a. Motor Leg, Left: 0-->No drift, leg holds 30 degree position for full 5 secs  6b. Motor Leg, Right: 2-->Some effort against gravity, leg falls to bed by 5 secs, but has some effort against gravity  7. Limb Ataxia: 0-->Absent  8. Sensory: 0-->Normal, no sensory loss  9. Best Language: 0-->No aphasia, normal  10. Dysarthria: 1-->Mild-to-moderate dysarthria, patient slurs at least some words and, at worst, can be understood with some difficulty  11. Extinction and Inattention (formerly Neglect): 0-->No abnormality  Total (NIH Stroke Scale): 6       Modified Graham Score: 0  [place] Coma Scale:    ABCD2 Score:    [id number] Score:   HAS -BLED Score:   ICH Score:1  Hunt & Mccray Classification:      Hemorrhagic change of an Ischemic Stroke: Does this patient have an ischemic stroke with hemorrhagic changes? No     Neurologic Chief Complaint: L caudate/ thalamic ICH  w/ IVH    Subjective:     Interval History: Patient is seen for follow-up neurological assessment and treatment recommendations: LORY. Working with PT/OT. BP better controlled.     HPI, Past Medical, Family, and Social History remains the same as documented in the initial encounter.     Review of Systems   Constitutional: Positive for appetite change. Negative for chills.   HENT: Positive for trouble swallowing.    Eyes: Negative for visual disturbance.   Respiratory: Negative for chest tightness.    Cardiovascular: Negative for chest pain.   Gastrointestinal: Negative for abdominal pain.   Genitourinary: Negative for flank pain and hematuria.   Skin: Negative.    Neurological: Positive for facial asymmetry, speech difficulty and weakness.   Hematological: Negative.    Psychiatric/Behavioral: Negative.      Scheduled Meds:   enoxaparin  40 mg Subcutaneous Daily    hydrALAZINE  50 mg Oral Q6H    labetaloL  100 mg Oral Q8H    [START ON 5/19/2020] losartan  50 mg Oral Daily    [START ON 5/19/2020] polyethylene glycol  17 g Oral Daily    senna-docusate 8.6-50 mg  1 tablet Oral BID    [START ON 5/19/2020] silodosin  4 mg Oral Daily     Continuous Infusions:   sodium chloride 0.9% 100 mL/hr at 05/18/20 1205     PRN Meds:acetaminophen, albuterol-ipratropium, hydrALAZINE, labetaloL, magnesium oxide, magnesium oxide, potassium chloride 10%, potassium chloride 10%, potassium chloride 10%, potassium, sodium phosphates, potassium, sodium phosphates, potassium, sodium phosphates, sodium chloride 0.9%    Objective:     Vital Signs (Most Recent):  Temp: 97.6 °F (36.4 °C) (05/18/20 1105)  Pulse: 84 (05/18/20 1205)  Resp: (!) 21 (05/18/20 1205)  BP: (!) 156/81 (05/18/20 1205)  SpO2: 97 % (05/18/20 1205)  BP Location: Left arm    Vital Signs Range (Last 24H):  Temp:  [97.6 °F (36.4 °C)-99 °F (37.2 °C)]   Pulse:  []   Resp:  [14-29]   BP: (122-174)/(58-96)   SpO2:  [94 %-97 %]   Arterial Line BP: (110-159)/(49-84)   BP  Location: Left arm    Physical Exam    Neurological Exam:   LOC: alert  Attention Span: Good   Language: No aphasia  Articulation: Dysarthria  Orientation: Person, Place, Time , Not oriented to person, Not oriented to place  Visual Fields: Full  EOM (CN III, IV, VI): Full/intact  Pupils (CN II, III): PERRL  Facial Sensation (CN V): Normal  Facial Movement (CN VII): Facial asymmetry in Rt side    Gag Reflex: present  Reflexes: 2+ throughout  Motor: Arm left  Normal 5/5  Leg left  Normal 5/5  Arm right  Paresis: 1/5  Leg right Paresis: 2/5  Cebellar: No evidence of appendicular or axial ataxia  Sensation: Intact to light touch, temperature and vibration  Tone: Spasticity  RUE and RLE     Laboratory:  CMP:   Recent Labs   Lab 05/18/20  0005   CALCIUM 8.6*   ALBUMIN 3.1*   PROT 5.9*      K 4.0   CO2 22*   *   BUN 24*   CREATININE 0.8   ALKPHOS 48*   ALT 15   AST 20   BILITOT 0.5     Lipid Panel:   Recent Labs   Lab 05/14/20  1251   CHOL 183  183   LDLCALC 117.2  117.2   HDL 54  54   TRIG 59  59     Hgb A1C:   Recent Labs   Lab 05/14/20  1251   HGBA1C 5.4  5.4     TSH:   Recent Labs   Lab 05/14/20  1251   TSH 1.114  1.114       Diagnostic Results     Brain Imaging   CT head:   - Left basal ganglia parenchymal hemorrhage with intraventricular extension again noted, the overall appearance is stable without evidence for significant interval detrimental change.    Cardiac Imaging   TTE:  EF 55%, normal RV diastolic function. No regurgitation or thrombus reported.      Holly Piedra MD  Comprehensive Stroke Center  Department of Vascular Neurology   [facility]

## 2020-05-18 NOTE — SUBJECTIVE & OBJECTIVE
Neurologic Chief Complaint: L caudate/ thalamic ICH w/ IVH    Subjective:     Interval History: Patient is seen for follow-up neurological assessment and treatment recommendations: LORY. Working with PT/OT. BP better controlled.     HPI, Past Medical, Family, and Social History remains the same as documented in the initial encounter.     Review of Systems   Constitutional: Positive for appetite change. Negative for chills.   HENT: Positive for trouble swallowing.    Eyes: Negative for visual disturbance.   Respiratory: Negative for chest tightness.    Cardiovascular: Negative for chest pain.   Gastrointestinal: Negative for abdominal pain.   Genitourinary: Negative for flank pain and hematuria.   Skin: Negative.    Neurological: Positive for facial asymmetry, speech difficulty and weakness.   Hematological: Negative.    Psychiatric/Behavioral: Negative.      Scheduled Meds:   enoxaparin  40 mg Subcutaneous Daily    hydrALAZINE  50 mg Oral Q6H    labetaloL  100 mg Oral Q8H    [START ON 5/19/2020] losartan  50 mg Oral Daily    [START ON 5/19/2020] polyethylene glycol  17 g Oral Daily    senna-docusate 8.6-50 mg  1 tablet Oral BID    [START ON 5/19/2020] silodosin  4 mg Oral Daily     Continuous Infusions:   sodium chloride 0.9% 100 mL/hr at 05/18/20 1205     PRN Meds:acetaminophen, albuterol-ipratropium, hydrALAZINE, labetaloL, magnesium oxide, magnesium oxide, potassium chloride 10%, potassium chloride 10%, potassium chloride 10%, potassium, sodium phosphates, potassium, sodium phosphates, potassium, sodium phosphates, sodium chloride 0.9%    Objective:     Vital Signs (Most Recent):  Temp: 97.6 °F (36.4 °C) (05/18/20 1105)  Pulse: 84 (05/18/20 1205)  Resp: (!) 21 (05/18/20 1205)  BP: (!) 156/81 (05/18/20 1205)  SpO2: 97 % (05/18/20 1205)  BP Location: Left arm    Vital Signs Range (Last 24H):  Temp:  [97.6 °F (36.4 °C)-99 °F (37.2 °C)]   Pulse:  []   Resp:  [14-29]   BP: (122-174)/(58-96)   SpO2:  [94  %-97 %]   Arterial Line BP: (110-159)/(49-84)   BP Location: Left arm    Physical Exam    Neurological Exam:   LOC: alert  Attention Span: Good   Language: No aphasia  Articulation: Dysarthria  Orientation: Person, Place, Time , Not oriented to person, Not oriented to place  Visual Fields: Full  EOM (CN III, IV, VI): Full/intact  Pupils (CN II, III): PERRL  Facial Sensation (CN V): Normal  Facial Movement (CN VII): Facial asymmetry in Rt side    Gag Reflex: present  Reflexes: 2+ throughout  Motor: Arm left  Normal 5/5  Leg left  Normal 5/5  Arm right  Paresis: 1/5  Leg right Paresis: 2/5  Cebellar: No evidence of appendicular or axial ataxia  Sensation: Intact to light touch, temperature and vibration  Tone: Spasticity  RUE and RLE     Laboratory:  CMP:   Recent Labs   Lab 05/18/20  0005   CALCIUM 8.6*   ALBUMIN 3.1*   PROT 5.9*      K 4.0   CO2 22*   *   BUN 24*   CREATININE 0.8   ALKPHOS 48*   ALT 15   AST 20   BILITOT 0.5     Lipid Panel:   Recent Labs   Lab 05/14/20  1251   CHOL 183  183   LDLCALC 117.2  117.2   HDL 54  54   TRIG 59  59     Hgb A1C:   Recent Labs   Lab 05/14/20  1251   HGBA1C 5.4  5.4     TSH:   Recent Labs   Lab 05/14/20  1251   TSH 1.114  1.114       Diagnostic Results     Brain Imaging   CT head:   - Left basal ganglia parenchymal hemorrhage with intraventricular extension again noted, the overall appearance is stable without evidence for significant interval detrimental change.    Cardiac Imaging   TTE:  EF 55%, normal RV diastolic function. No regurgitation or thrombus reported.

## 2020-05-18 NOTE — PROGRESS NOTES
[facility]  Neurosurgery  Progress Note    Subjective:     History of Present Illness: Brett Vega is a 75 y.o. male with no known PMH and no contact with healthcare who presents [place] [facility] for new onset of R hemiparesis and dysarthria. Patient believes onset was around 3 am. Wife awoke this morning and found him unable to more R side with slurred speech so called EMS. BP on arrival to OSH was 247/129. He was started on a cardene gtt. CTH revealed L caudate/thalamic ICH w/ IVH. NSGY consulted for evaluation. Upon my exam, pt has GCS 15, AAOx3 and FC on L side, hemiparetic on R with significant dysarthria. He is legally blind at baseline, denies any acute visual changes. Denies any other complaints. He takes no blood thinners or any other medications.      Post-Op Info:  * No surgery found *         Interval History: 5/18: NAEON, AFVSS, Exam remains stable, no surgical intervention    No medications prior to admission.       Review of patient's allergies indicates:  No Known Allergies    History reviewed. No pertinent past medical history.  History reviewed. No pertinent surgical history.  Family History     None        Tobacco Use    Smoking status: Former Smoker    Smokeless tobacco: Never Used   Substance and Sexual Activity    Alcohol use: Not on file    Drug use: Not on file    Sexual activity: Not on file     Review of Systems    Objective:     Weight: 95.3 kg (210 lb)  Body mass index is 30.13 kg/m².  Vital Signs (Most Recent):  Temp: 98.6 °F (37 °C) (05/18/20 0305)  Pulse: 88 (05/18/20 0611)  Resp: 20 (05/18/20 0605)  BP: (!) 160/74 (05/18/20 0611)  SpO2: 95 % (05/18/20 0605) Vital Signs (24h Range):  Temp:  [98.2 °F (36.8 °C)-98.9 °F (37.2 °C)] 98.6 °F (37 °C)  Pulse:  [] 88  Resp:  [13-23] 20  SpO2:  [94 %-97 %] 95 %  BP: (125-174)/(58-96) 160/74  Arterial Line BP: (110-159)/(49-84) 154/72                Oxygen Concentration (%):  [24] 24         NG/OG Tube  "05/15/20 1630 Left nostril (Active)   Placement Check placement verified by aspirate characteristics;placement verified by distal tube length measurement;placement verified by x-ray 5/16/2020 11:05 AM   Tolerance no signs/symptoms of discomfort 5/16/2020 11:05 AM   Securement secured to nostril center w/ adhesive device 5/16/2020 11:05 AM   Clamp Status/Tolerance unclamped 5/16/2020 12:05 PM   Insertion Site Appearance no redness, warmth, tenderness, skin breakdown, drainage 5/16/2020 12:05 PM   Flush/Irrigation flushed w/;water;no resistance met 5/16/2020 12:05 PM   Feeding Type continuous 5/16/2020 12:05 PM   Feeding Action feeding restarted 5/16/2020 12:05 PM   Current Rate (mL/hr) 10 mL/hr 5/16/2020 12:05 PM   Goal Rate (mL/hr) 10 mL/hr 5/16/2020 12:05 PM   Intake (mL) 30 mL 5/16/2020 12:05 PM   Intake (mL) - Formula Tube Feeding 10 5/16/2020 12:05 PM   Residual Amount (ml) 0 ml 5/16/2020 12:05 PM            Urethral Catheter 05/16/20 1205 Straight-tip (Active)   Site Assessment Clean;Intact 5/16/2020 12:05 PM   Collection Container Urimeter 5/16/2020 12:05 PM   Securement Method secured to top of thigh w/ adhesive device 5/16/2020 12:05 PM   Catheter Care Performed yes 5/16/2020 12:05 PM   Reason for Continuing Urinary Catheterization Urinary retention 5/16/2020 12:05 PM   CAUTI Prevention Bundle StatLock in place w 1" slack;Intact seal between catheter & drainage tubing;Drainage bag/urimeter off the floor;Green sheeting clip in use;No dependent loops or kinks;Drainage bag/urimeter not overfilled (<2/3 full);Drainage bag/urimeter below bladder 5/16/2020 12:05 PM   Output (mL) 30 mL 5/16/2020 12:05 PM       Neurosurgery Physical Exam  [id number]. AOx2 (self, place). No distress.  CN II-XII intact except for R facial droop. Blind at baseline  FC on LUE/LLE with 5/5 strength  Paretic on RUE/RLE  Sensation intact      Significant Labs:  Recent Labs   Lab 05/17/20  0137 05/17/20  1239 05/18/20  0005   *  --  134* "     --  142   K 3.5 4.0 4.0     --  111*   CO2 22*  --  22*   BUN 15  --  24*   CREATININE 0.8  --  0.8   CALCIUM 8.7  --  8.6*   MG 2.0  --  2.1     Recent Labs   Lab 05/17/20  0137 05/18/20  0005   WBC 10.90 8.65   HGB 13.8* 12.8*   HCT 43.8 40.4    226     No results for input(s): LABPT, INR, APTT in the last 48 hours.  Microbiology Results (last 7 days)     ** No results found for the last 168 hours. **        All pertinent labs from the last 24 hours have been reviewed.    Significant Diagnostics:  CT: No results found in the last 24 hours.  MRI: No results found in the last 24 hours.    Assessment/Plan:     Nontraumatic intracerebral hemorrhage   76 y/o M w/ no known PMH who presented with new onset RSW and dysarthria, found to have L caudate tail/thalamic ICH with IVH (ICH score 2):    --Patient admitted to [facility] on telemetry      -q1h neurochecks in ICU  --All labs and diagnostics reviewed  --CTH 5/14 shows L caudate/thalamic ICH with IVH in lateral ventricles (L>R), 3rd, and 4th ventricles. Mild prominence of ventricles but no overt hydrocephalus, stable on repeat CTH   - CTH 5/17: stable  --No anti-plt/coag medications  --HTN: SBP <160 (continue cardene ggt; hydralazine & labetalol PRN) - [facility] managing  --Na >135  --HOB >30  --Full pre-op labs reviewed, coags WNL, T&S reviewed  --Covid test negative 5/14  --Advance nutrition per [facility]  --Continue to monitor clinically, notify NSGY immediately with any changes in neuro status  --No EVD or other surgical intervention at this time, NSGY will sign-off at this time  --Continue care per primary team  --Please call with any questions or concerns    Dispo: Per [facility]        Connor Antoine MD  Neurosurgery  [facility]

## 2020-05-18 NOTE — ASSESSMENT & PLAN NOTE
74 y/o M w/ no known PMH who presented with new onset RSW and dysarthria, found to have L caudate tail/thalamic ICH with IVH (ICH score 2):    --Patient admitted to [facility] on telemetry      -q1h neurochecks in ICU  --All labs and diagnostics reviewed  --CTH 5/14 shows L caudate/thalamic ICH with IVH in lateral ventricles (L>R), 3rd, and 4th ventricles. Mild prominence of ventricles but no overt hydrocephalus, stable on repeat CTH   - CTH 5/17: stable  --No anti-plt/coag medications  --HTN: SBP <160 (continue cardene ggt; hydralazine & labetalol PRN) - [facility] managing  --Na >135  --HOB >30  --Full pre-op labs reviewed, coags WNL, T&S reviewed  --Covid test negative 5/14  --Advance nutrition per [facility]  --Continue to monitor clinically, notify NSGY immediately with any changes in neuro status  --No EVD or other surgical intervention at this time, NSGY will sign-off at this time  --Continue care per primary team  --Please call with any questions or concerns    Dispo: Per [facility]

## 2020-05-18 NOTE — PT/OT/SLP PROGRESS
"Physical Therapy Treatment    Patient Name:  Brett Vega   MRN:  [id number]    Recommendations:     Discharge Recommendations:  rehabilitation facility   Discharge Equipment Recommendations: (TBD as pt progresses)   Barriers to discharge: Decreased caregiver support    Assessment:     Brett Vega is a 75 y.o. male admitted with a medical diagnosis of intracerebral hemorrhage.  He presents with the following impairments/functional limitations:  weakness, impaired functional mobilty, impaired balance, decreased lower extremity function, decreased upper extremity function, decreased coordination, decreased safety awareness, impaired coordination . Pt is unsafe with functional mobility at this time due to pt requires max assist for bed mobility, moderate assist of 2 for transfers, and total assist for gait due to R LE weakness, impaired midline, and instability. Pt is motivated to progress with functional mobility.    Rehab Prognosis: Fair; patient would benefit from acute skilled PT services to address these deficits and reach maximum level of function.    Recent Surgery: * No surgery found *      Plan:     During this hospitalization, patient to be seen 4 x/week to address the identified rehab impairments via gait training, therapeutic activities, therapeutic exercises, neuromuscular re-education and progress toward the following goals:    · Plan of Care Expires:  06/14/20    Subjective   "I'm hot"    Pain/Comfort:  · Pain Rating 1: 0/10  · Pain Rating Post-Intervention 1: 0/10      Objective:     Communicated with nurse prior to session.  Patient found supine with blood pressure cuff, oxygen, peripheral IV, telemetry, pulse ox (continuous), bed alarm, Condom Catheter upon PT entry to room.     General Precautions: Standard, aspiration, fall, NPO, vision impaired(legally blind)   Orthopedic Precautions:N/A   Braces: N/A     Functional Mobility:  · Bed Mobility:     · Rolling Right: moderate " assistance  · Supine to Sit: maximal assistance  · Transfers: sit to stand with moderate assist of 2 x 2 trials with HHA to support R UE  · Bed to bedside chair with max assist of 2  · Gait: pt attempted to take 2 steps during transfer to bedside chair with R knee flexing during stance phase requiring max assist of 2 to remain upright    AM-PAC 6 CLICK MOBILITY  Turning over in bed (including adjusting bedclothes, sheets and blankets)?: 2  Sitting down on and standing up from a chair with arms (e.g., wheelchair, bedside commode, etc.): 2  Moving from lying on back to sitting on the side of the bed?: 2  Moving to and from a bed to a chair (including a wheelchair)?: 2  Need to walk in hospital room?: 1  Climbing 3-5 steps with a railing?: 1  Basic Mobility Total Score: 10     Therapeutic Activities and Exercises:   pt sat on the EOB ~ 14 min with max assist initially due to pt pushing to the R with L UE, improved to requiring only minimal assist when pt held onto the end of the bed frame with his L UE. Pt performed B LE exs in sitting x 10 reps: hip flex (with AAROM on R LE), knee ext, and ankle pumps (with AAROM on R LE)     Patient left up in chair with all lines intact, call button in reach and nurse notified..    GOALS:   Multidisciplinary Problems     Physical Therapy Goals        Problem: Physical Therapy Goal    Goal Priority Disciplines Outcome Goal Variances Interventions   Physical Therapy Goal     PT, PT/OT Ongoing, Progressing     Description:  PT goals until 5/28/20    1. Pt supine to sit with minimal assist-not met  2. Pt sit to supine with minimal assist-not met  3. Pt sit to stand with hemiwalker with minimal assist-not met  4. Pt to perform gait 5ft with hemiwalker with max assist.-not met  5. Pt to transfer bed to/from bedside chair with hemiwalker with moderate assist.-not met  6. Pt to perform B LE exs in sitting or supine x 15 reps to strengthen B LE to improve functional mobility.-not met  7. Pt  to sit on the EOB ~ 10 min with CGA to allow pt to perform functional activities-not met                     Time Tracking:     PT Received On: 05/18/20  PT Start Time: 1020     PT Stop Time: 1048  PT Total Time (min): 28 min     Billable Minutes: Therapeutic Activity 18 and Therapeutic Exercise 10    Treatment Type: Treatment  PT/PTA: PT           Marissa Guajardo, PT  05/18/2020

## 2020-05-18 NOTE — PLAN OF CARE
Problem: SLP Goal  Goal: SLP Goal  Description  Speech Language Pathology Goals  Goals expected to be met by 5/21    1. Pt will participate in ongoing swallow assessment  2. Pt will utilize speech strategies with min cues  3. Pt will follow complex commands with 50% accy and min cues  4. Pt will complete problem solving task with 80% accy and min cues           Outcome: Ongoing, Progressing   Continue POC at this time, all goals remain appropriate.   Johnna Duran CCC-SLP  5/18/2020

## 2020-05-18 NOTE — SUBJECTIVE & OBJECTIVE
Review of Systems   Constitutional: Negative for chills.   HENT: Positive for trouble swallowing and voice change.    Eyes: Negative for visual disturbance.   Respiratory: Negative for chest tightness.    Cardiovascular: Negative for chest pain.   Gastrointestinal: Negative for abdominal pain.   Genitourinary: Positive for difficulty urinating.   Musculoskeletal: Positive for neck stiffness.   Skin: Negative.    Neurological: Positive for facial asymmetry, speech difficulty and weakness.   Hematological: Negative.    Psychiatric/Behavioral: Negative.        Objective:     Vitals:  Temp: 99 °F (37.2 °C)  Pulse: 82  Rhythm: normal sinus rhythm  BP: (!) 154/88  MAP (mmHg): 102  Resp: (!) 21  SpO2: 95 %  O2 Device (Oxygen Therapy): nasal cannula    Temp  Min: 98.5 °F (36.9 °C)  Max: 99 °F (37.2 °C)  Pulse  Min: 73  Max: 109  BP  Min: 122/67  Max: 174/80  MAP (mmHg)  Min: 89  Max: 117  Resp  Min: 14  Max: 28  SpO2  Min: 94 %  Max: 97 %    05/17 0701 - 05/18 0700  In: 2842.2 [I.V.:1767.2]  Out: 1265 [Urine:1265]   Unmeasured Output  Urine Occurrence: 0  Stool Occurrence: 1  Emesis Occurrence: 0  Pad Count: 2       Physical Exam   Constitutional: He appears well-developed.   HENT:   Head: Normocephalic.   Eyes: Pupils are equal, round, and reactive to light.   Neck: No JVD present.   Cardiovascular: Normal heart sounds.   Pulmonary/Chest: Breath sounds normal.   Abdominal: Soft. Bowel sounds are normal.   Musculoskeletal: He exhibits edema.   Neurological: He is alert.   Significantly dysarthric  Right leg slightly stronger then right arm, distal UE strength more intact  Right hemisensory deficit   Skin: Skin is warm. Capillary refill takes less than 2 seconds.   Psychiatric: He has a normal mood and affect.       Medications:  Continuous  sodium chloride 0.9% Last Rate: 100 mL/hr at 05/18/20 1141   Scheduled  hydrALAZINE 50 mg Q6H   labetaloL 100 mg Q8H   [START ON 5/19/2020] losartan 50 mg Daily   [START ON  5/19/2020] polyethylene glycol 17 g Daily   senna-docusate 8.6-50 mg 1 tablet BID   [START ON 5/19/2020] silodosin 4 mg Daily   PRN  acetaminophen 650 mg Q4H PRN   albuterol-ipratropium 3 mL Q4H PRN   hydrALAZINE 10 mg Q4H PRN   labetaloL 10 mg Q4H PRN   magnesium oxide 800 mg PRN   magnesium oxide 800 mg PRN   potassium chloride 10% 40 mEq PRN   potassium chloride 10% 40 mEq PRN   potassium chloride 10% 40 mEq PRN   potassium, sodium phosphates 2 packet PRN   potassium, sodium phosphates 2 packet PRN   potassium, sodium phosphates 2 packet PRN   sodium chloride 0.9% 10 mL PRN     Today I personally reviewed pertinent medications, lines/drains/airways, imaging, laboratory results, notably:    Diet  Diet Dysphagia Pureed (IDDSI Level 4) Honey Thick  Diet Dysphagia Pureed (IDDSI Level 4) Honey Thick

## 2020-05-18 NOTE — PT/OT/SLP PROGRESS
Occupational Therapy   Treatment    Name: Brett Vega  MRN: [id number]  Admitting Diagnosis:Nontraumatic intracerebral hemorrhage    Recommendations:     Discharge Recommendations: rehabilitation facility  Discharge Equipment Recommendations:  (TBD with progression in care)  Barriers to discharge:  (level of skilled assistance required)    Assessment:     Brett Vega is a 75 y.o. male with a medical diagnosis of Nontraumatic intracerebral hemorrhage. He presents with R hemiparesis and extinction with bilateral tasks. He demo gains towards OT goals on this date and would greatly benefit from inpatient rehab at post acute level of care to maximize functional outcomes. Performance deficits affecting function are weakness, impaired endurance, impaired self care skills, impaired functional mobilty, gait instability, impaired balance, decreased upper extremity function, decreased lower extremity function, visual deficits, impaired coordination, decreased ROM, decreased safety awareness, impaired cognition.     Rehab Prognosis:  Good; patient would benefit from acute skilled OT services to address these deficits and reach maximum level of function.       Plan:     Patient to be seen 4 x/week to address the above listed problems via self-care/home management, therapeutic activities, therapeutic exercises, neuromuscular re-education, cognitive retraining  · Plan of Care Expires: 06/13/20  · Plan of Care Reviewed with: patient    Subjective     Pain/Comfort:  · Pain Rating 1: 0/10  · Pain Rating Post-Intervention 1: 0/10    Objective:     Communicated with: RN prior to session. Completed with PT in ICU setting.  Patient found HOB elevated with blood pressure cuff, peripheral IV, pulse ox (continuous), telemetry, NG tube, oxygen upon OT entry to room.    General Precautions: Standard, aspiration, fall, NPO, vision impaired   Orthopedic Precautions:N/A   Braces: N/A     Occupational Performance:     Bed Mobility:     · Patient completed Rolling/Turning to Right with moderate assistance and with side rail  · Patient completed Supine to Sit with maximal assistance and with side rail     Functional Mobility/Transfers:  · Patient completed Sit <> Stand Transfer with maximal assistance and of 2 persons  with  no assistive device   · x2 from EOB  · Patient completed Bed <> Chair Transfer using Stand Pivot technique with maximal assistance and of 2 persons with no assistive device    Activities of Daily Living:  · Feeding:  NPO    · Grooming: contact guard assistance L hand as dominant to comb hair with sitting up in chair ; set up and min(A) for oral care while sitting up in chair  · Upper Body Dressing: maximal assistance rashmi technique EOB to don gown as jacket    [facility] 6 Click ADL: 11    Treatment & Education:  -Pt alert and oriented x4; agreeable to therapy session  -EOB with max-min(A) for postural control; facilitation for R UE in extended arm weight bearing  -up in chair: completed AAROM with gentle end range stretch for R UE in all functional planes x10 reps; encouraged visual attention with task   -edu on positioning for R UE while sitting up in chair with verbalized understanding  -Communication board updated; questions/concerns addressed within OT scope of practice      Patient left up in chair with all lines intact, call button in reach and RN notifiedEducation:      GOALS:   Multidisciplinary Problems     Occupational Therapy Goals        Problem: Occupational Therapy Goal    Goal Priority Disciplines Outcome Interventions   Occupational Therapy Goal     OT, PT/OT Ongoing, Progressing    Description:  Goals to be met by: 5/29/2020     Patient will increase functional independence with ADLs by performing:    Grooming while EOB with Minimal Assistance.  Toileting from bedside commode with Moderate Assistance for hygiene and clothing management.   Sitting at edge of bed x10 minutes with Minimal Assistance in preparation of  functional seated grooming tasks   Stand pivot transfers with Moderate Assistance.  Toilet transfer to bedside commode with Moderate Assistance.                      Time Tracking:     OT Date of Treatment: 05/18/20  OT Start Time: 1023  OT Stop Time: 1046  OT Total Time (min): 23 min    Billable Minutes:Self Care/Home Management 10  Neuromuscular Re-education 13    DELLA Evans  5/18/2020

## 2020-05-19 PROBLEM — E86.1 HYPOVOLEMIA: Status: RESOLVED | Noted: 2020-05-14 | Resolved: 2020-05-19

## 2020-05-19 LAB
ALBUMIN SERPL BCP-MCNC: 3.2 G/DL (ref 3.5–5.2)
ALP SERPL-CCNC: 56 U/L (ref 55–135)
ALT SERPL W/O P-5'-P-CCNC: 15 U/L (ref 10–44)
ANION GAP SERPL CALC-SCNC: 7 MMOL/L (ref 8–16)
AST SERPL-CCNC: 20 U/L (ref 10–40)
BASOPHILS # BLD AUTO: 0.03 K/UL (ref 0–0.2)
BASOPHILS NFR BLD: 0.3 % (ref 0–1.9)
BILIRUB SERPL-MCNC: 0.5 MG/DL (ref 0.1–1)
BUN SERPL-MCNC: 25 MG/DL (ref 8–23)
CALCIUM SERPL-MCNC: 8.4 MG/DL (ref 8.7–10.5)
CHLORIDE SERPL-SCNC: 111 MMOL/L (ref 95–110)
CO2 SERPL-SCNC: 22 MMOL/L (ref 23–29)
CREAT SERPL-MCNC: 0.8 MG/DL (ref 0.5–1.4)
DIFFERENTIAL METHOD: ABNORMAL
EOSINOPHIL # BLD AUTO: 0 K/UL (ref 0–0.5)
EOSINOPHIL NFR BLD: 0.1 % (ref 0–8)
ERYTHROCYTE [DISTWIDTH] IN BLOOD BY AUTOMATED COUNT: 13.9 % (ref 11.5–14.5)
EST. GFR  (AFRICAN AMERICAN): >60 ML/MIN/1.73 M^2
EST. GFR  (NON AFRICAN AMERICAN): >60 ML/MIN/1.73 M^2
GLUCOSE SERPL-MCNC: 116 MG/DL (ref 70–110)
HCT VFR BLD AUTO: 40.7 % (ref 40–54)
HGB BLD-MCNC: 12.5 G/DL (ref 14–18)
IMM GRANULOCYTES # BLD AUTO: 0.01 K/UL (ref 0–0.04)
IMM GRANULOCYTES NFR BLD AUTO: 0.1 % (ref 0–0.5)
LYMPHOCYTES # BLD AUTO: 0.8 K/UL (ref 1–4.8)
LYMPHOCYTES NFR BLD: 8.6 % (ref 18–48)
MAGNESIUM SERPL-MCNC: 2.2 MG/DL (ref 1.6–2.6)
MCH RBC QN AUTO: 28.7 PG (ref 27–31)
MCHC RBC AUTO-ENTMCNC: 30.7 G/DL (ref 32–36)
MCV RBC AUTO: 93 FL (ref 82–98)
MONOCYTES # BLD AUTO: 0.8 K/UL (ref 0.3–1)
MONOCYTES NFR BLD: 8.9 % (ref 4–15)
NEUTROPHILS # BLD AUTO: 7.5 K/UL (ref 1.8–7.7)
NEUTROPHILS NFR BLD: 82 % (ref 38–73)
NRBC BLD-RTO: 0 /100 WBC
PHOSPHATE SERPL-MCNC: 3.4 MG/DL (ref 2.7–4.5)
PLATELET # BLD AUTO: 214 K/UL (ref 150–350)
PMV BLD AUTO: 10.3 FL (ref 9.2–12.9)
POTASSIUM SERPL-SCNC: 4.1 MMOL/L (ref 3.5–5.1)
PROT SERPL-MCNC: 6.5 G/DL (ref 6–8.4)
RBC # BLD AUTO: 4.36 M/UL (ref 4.6–6.2)
SODIUM SERPL-SCNC: 140 MMOL/L (ref 136–145)
WBC # BLD AUTO: 9.15 K/UL (ref 3.9–12.7)

## 2020-05-19 PROCEDURE — 99233 PR SUBSEQUENT HOSPITAL CARE,LEVL III: ICD-10-PCS | Mod: GC,,, | Performed by: PSYCHIATRY & NEUROLOGY

## 2020-05-19 PROCEDURE — 63600175 PHARM REV CODE 636 W HCPCS: Performed by: NURSE PRACTITIONER

## 2020-05-19 PROCEDURE — 25000003 PHARM REV CODE 250: Performed by: NURSE PRACTITIONER

## 2020-05-19 PROCEDURE — 25000003 PHARM REV CODE 250: Performed by: PSYCHIATRY & NEUROLOGY

## 2020-05-19 PROCEDURE — 84100 ASSAY OF PHOSPHORUS: CPT

## 2020-05-19 PROCEDURE — 80053 COMPREHEN METABOLIC PANEL: CPT

## 2020-05-19 PROCEDURE — 97535 SELF CARE MNGMENT TRAINING: CPT

## 2020-05-19 PROCEDURE — 85025 COMPLETE CBC W/AUTO DIFF WBC: CPT

## 2020-05-19 PROCEDURE — 63600175 PHARM REV CODE 636 W HCPCS: Performed by: PSYCHIATRY & NEUROLOGY

## 2020-05-19 PROCEDURE — 25000242 PHARM REV CODE 250 ALT 637 W/ HCPCS: Performed by: NURSE PRACTITIONER

## 2020-05-19 PROCEDURE — 83735 ASSAY OF MAGNESIUM: CPT

## 2020-05-19 PROCEDURE — 97110 THERAPEUTIC EXERCISES: CPT

## 2020-05-19 PROCEDURE — 97803 MED NUTRITION INDIV SUBSEQ: CPT

## 2020-05-19 PROCEDURE — 99233 SBSQ HOSP IP/OBS HIGH 50: CPT | Mod: GC,,, | Performed by: PSYCHIATRY & NEUROLOGY

## 2020-05-19 PROCEDURE — 97530 THERAPEUTIC ACTIVITIES: CPT

## 2020-05-19 PROCEDURE — 92526 ORAL FUNCTION THERAPY: CPT

## 2020-05-19 PROCEDURE — 11000001 HC ACUTE MED/SURG PRIVATE ROOM

## 2020-05-19 PROCEDURE — 94761 N-INVAS EAR/PLS OXIMETRY MLT: CPT

## 2020-05-19 PROCEDURE — 92507 TX SP LANG VOICE COMM INDIV: CPT

## 2020-05-19 PROCEDURE — 94640 AIRWAY INHALATION TREATMENT: CPT

## 2020-05-19 RX ORDER — LOSARTAN POTASSIUM 50 MG/1
100 TABLET ORAL DAILY
Status: DISCONTINUED | OUTPATIENT
Start: 2020-05-20 | End: 2020-05-25

## 2020-05-19 RX ORDER — ALBUTEROL SULFATE 2.5 MG/.5ML
2.5 SOLUTION RESPIRATORY (INHALATION) ONCE
Status: COMPLETED | OUTPATIENT
Start: 2020-05-19 | End: 2020-05-19

## 2020-05-19 RX ORDER — ALBUTEROL SULFATE 2.5 MG/.5ML
2.5 SOLUTION RESPIRATORY (INHALATION) EVERY 6 HOURS PRN
Status: DISCONTINUED | OUTPATIENT
Start: 2020-05-19 | End: 2020-05-20

## 2020-05-19 RX ADMIN — LABETALOL HYDROCHLORIDE 100 MG: 100 TABLET, FILM COATED ORAL at 04:05

## 2020-05-19 RX ADMIN — ENOXAPARIN SODIUM 40 MG: 100 INJECTION SUBCUTANEOUS at 05:05

## 2020-05-19 RX ADMIN — Medication 10 MG: at 01:05

## 2020-05-19 RX ADMIN — LOSARTAN POTASSIUM 50 MG: 25 TABLET ORAL at 08:05

## 2020-05-19 RX ADMIN — HYDRALAZINE HYDROCHLORIDE 10 MG: 20 INJECTION INTRAMUSCULAR; INTRAVENOUS at 07:05

## 2020-05-19 RX ADMIN — HYDRALAZINE HYDROCHLORIDE 75 MG: 50 TABLET ORAL at 11:05

## 2020-05-19 RX ADMIN — HYDRALAZINE HYDROCHLORIDE 75 MG: 50 TABLET ORAL at 05:05

## 2020-05-19 RX ADMIN — LABETALOL HYDROCHLORIDE 100 MG: 100 TABLET, FILM COATED ORAL at 09:05

## 2020-05-19 RX ADMIN — SODIUM CHLORIDE: 0.9 INJECTION, SOLUTION INTRAVENOUS at 06:05

## 2020-05-19 RX ADMIN — HYDRALAZINE HYDROCHLORIDE 10 MG: 20 INJECTION INTRAMUSCULAR; INTRAVENOUS at 02:05

## 2020-05-19 RX ADMIN — SILODOSIN 4 MG: 4 CAPSULE ORAL at 08:05

## 2020-05-19 RX ADMIN — LABETALOL HYDROCHLORIDE 100 MG: 100 TABLET, FILM COATED ORAL at 05:05

## 2020-05-19 RX ADMIN — IPRATROPIUM BROMIDE AND ALBUTEROL SULFATE 3 ML: .5; 3 SOLUTION RESPIRATORY (INHALATION) at 07:05

## 2020-05-19 RX ADMIN — STANDARDIZED SENNA CONCENTRATE AND DOCUSATE SODIUM 1 TABLET: 8.6; 5 TABLET ORAL at 09:05

## 2020-05-19 RX ADMIN — HYDRALAZINE HYDROCHLORIDE 50 MG: 50 TABLET, FILM COATED ORAL at 05:05

## 2020-05-19 RX ADMIN — ALBUTEROL SULFATE 2.5 MG: 2.5 SOLUTION RESPIRATORY (INHALATION) at 10:05

## 2020-05-19 RX ADMIN — HYDRALAZINE HYDROCHLORIDE 75 MG: 50 TABLET ORAL at 12:05

## 2020-05-19 NOTE — PLAN OF CARE
Problem: Adult Inpatient Plan of Care  Goal: Plan of Care Review  Outcome: Ongoing, Progressing  Goal: Patient-Specific Goal (Individualization)  Description  Pt likes lights dimmed   Outcome: Ongoing, Progressing  Goal: Absence of Hospital-Acquired Illness or Injury  Outcome: Ongoing, Progressing  Goal: Readiness for Transition of Care  Outcome: Ongoing, Progressing  Goal: Rounds/Family Conference  Outcome: Ongoing, Progressing     Problem: Fall Injury Risk  Goal: Absence of Fall and Fall-Related Injury  Outcome: Ongoing, Progressing     Problem: Skin Injury Risk Increased  Goal: Skin Health and Integrity  Outcome: Ongoing, Progressing     Problem: Adjustment to Illness (Stroke, Hemorrhagic)  Goal: Optimal Coping  Outcome: Ongoing, Progressing     Problem: Bowel Elimination Impaired (Stroke, Hemorrhagic)  Goal: Effective Bowel Elimination  Outcome: Ongoing, Progressing     Problem: Communication Impairment (Stroke, Hemorrhagic)  Goal: Improved Communication Skills and Cognitive Function  Outcome: Ongoing, Progressing     Problem: Eating/Swallowing Impairment (Stroke, Hemorrhagic)  Goal: Oral Intake without Aspiration  Outcome: Ongoing, Progressing     Problem: Functional Ability Impaired (Stroke, Hemorrhagic)  Goal: Optimal Functional Ability  Outcome: Ongoing, Progressing     Problem: Pain (Stroke, Hemorrhagic)  Goal: Acceptable Pain Control  Outcome: Ongoing, Progressing     Problem: Sensorimotor Impairment (Stroke, Hemorrhagic)  Goal: Improved Sensorimotor Function  Outcome: Ongoing, Progressing     Problem: Urinary Elimination Impaired (Stroke, Hemorrhagic)  Goal: Effective Urinary Elimination  Outcome: Ongoing, Progressing     Problem: Infection  Goal: Infection Symptom Resolution  Outcome: Ongoing, Progressing     Problem: Feeding Intolerance (Enteral Nutrition)  Goal: Feeding Tolerance  Outcome: Ongoing, Progressing     POC discussed with patient and he states understanding. Will continue to monitor BP for  SBP less than 160. No signs of distress at the moment.

## 2020-05-19 NOTE — PT/OT/SLP PROGRESS
Occupational Therapy   Treatment    Name: Brett Vega  MRN: [id number]  Admitting Diagnosis:  Nontraumatic intracerebral hemorrhage       Recommendations:     Discharge Recommendations: rehabilitation facility  Discharge Equipment Recommendations:  (TBD with progression in care)  Barriers to discharge:  (level of skilled assistance required)    Assessment:     Brett Vega is a 75 y.o. male with a medical diagnosis of Nontraumatic intracerebral hemorrhage.  He presents with R (dominant) hemiparesis and an overall decline in his functional indep and safety with ADLs and mobility. He demo cont participation and motivation for rehab sessions at this time with gains towards OT goals. Performance deficits affecting function are weakness, impaired endurance, impaired self care skills, impaired functional mobilty, gait instability, impaired balance, decreased upper extremity function, decreased lower extremity function, visual deficits, impaired coordination, decreased ROM, decreased safety awareness, impaired cognition. Patient was living in community setting functioning at independent level for ADLs, and mobility prior to this event.  There is an expectation of returning to prior level of function to maintain independence thus avoiding readmission.  Patient's clinical condition meets full Inpatient Rehab (IPR) criteria; including the ability to actively participate in 3 hours of therapy.  A lower level of care(SNF) cannot provide the interdisciplinary treatment approach needed.     *Pt with increased edema to R hand on this date-- please be sure to elevate in supine with hand open (R UE: elevation and extension and abduction and open palm)    Rehab Prognosis:  Good; patient would benefit from acute skilled OT services to address these deficits and reach maximum level of function.       Plan:     Patient to be seen 4 x/week to address the above listed problems via self-care/home management, therapeutic activities,  therapeutic exercises, neuromuscular re-education, cognitive retraining  · Plan of Care Expires: 06/13/20  · Plan of Care Reviewed with: patient    Subjective     Pain/Comfort:  · Pain Rating 1: 0/10  · Pain Rating Post-Intervention 1: 0/10    Objective:     Communicated with: RN Rogelio) prior to session. Completed with PT in ICU setting. Patient found HOB elevated with blood pressure cuff, peripheral IV, pulse ox (continuous), telemetry, NG tube, oxygen upon OT entry to room.    General Precautions: Standard, pureed diet, honey thick, vision impaired, aspiration, fall, seizure   Orthopedic Precautions:N/A   Braces: N/A     Occupational Performance:     Bed Mobility:    · Patient completed Rolling/Turning to Right with maximal assistance  · Patient completed Supine to Sit with maximal assistance     Functional Mobility/Transfers:  · Patient completed Sit <> Stand Transfer with maximal assistance  with  no assistive device   · Patient completed Bed <> Chair Transfer using Step Transfer technique with maximal assistance and of 2 persons with no assistive device  · Functional Mobility: max(A) x2 persons for weight shift for pre gait steps at EOB    Activities of Daily Living:  · Feeding:  minimum assistance with set up  · Grooming: minimum assistance sitting up in chair   · Upper Body Dressing: maximal assistance rashmi technique EOB  · Lower Body Dressing: maximal assistance B socks- donning  · Toileting: total assistance perineal care in standing     [facility] 6 Click ADL: 12    Treatment & Education:  -Pt alert and agreeable to therapy session; reported orientation x4  -re edu on OT role in care and progression in care  -edu on weight bearing and R side attention with EOB (~10 min duration) task with min(A); able to sustain CGA for postural control x2 min duration with visual cue for midline control; facilitation required for R UE in extended arm weight bearing  -up in chair: EFFIE WHITTEN with gentle end range stretch in all  functional planes x10 reps; encouraged visual attention with task with min verbal cues to sustain  -edu on R UE positioning in supine with need for reinforcement   -Communication board updated; questions/concerns addressed within OT scope of practice      Patient left up in chair with all lines intact, call button in reach and RN notifiedEducation:      GOALS:   Multidisciplinary Problems     Occupational Therapy Goals        Problem: Occupational Therapy Goal    Goal Priority Disciplines Outcome Interventions   Occupational Therapy Goal     OT, PT/OT Ongoing, Progressing    Description:  Goals to be met by: 5/29/2020     Patient will increase functional independence with ADLs by performing:    Grooming while standing with Moderate Assistance.  LB dressing with moderate assistance.  Toileting from bedside commode with Moderate Assistance for hygiene and clothing management.   Sitting at edge of bed x10 minutes with Minimal Assistance in preparation of functional seated grooming tasks. MET  *revised: with CGA x10 min and midline orientation maintained  Stand pivot transfers with Moderate Assistance.  Toilet transfer to bedside commode with Moderate Assistance.                       Time Tracking:     OT Date of Treatment: 05/19/20  OT Start Time: 0822  OT Stop Time: 0850  OT Total Time (min): 28 min    Billable Minutes:Self Care/Home Management 8  Therapeutic Exercise 17    DELLA Evans  5/19/2020

## 2020-05-19 NOTE — PROGRESS NOTES
[facility]  Neurocritical Care  Progress Note    Admit Date: 5/14/2020  Service Date: 05/19/2020  Length of Stay: 5    Subjective:     Chief Complaint: Nontraumatic intracerebral hemorrhage    History of Present Illness: 76 yo male with no PMHX and no contact with health care his entire adult life transferred from [facility] after he presented there for new onset of dense right hemiparesis and marked dysarthria. Onset unclear but likely around 3am this morning. No recent illness. Legally blind. BP on arrival to outside facility 247/129. On Cardene ggt. 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 Telemedicine-stroke done by Dr Looney.  Currently pt awake, alert, oriented, following commands. No movement against gravity on the right side. On Cardene ggt. STAT consult to NSGY. Awaiting recs.     Hospital Course: 05/15/2020: SBP < 160, add hydralazine, PT/OT, evd watch  5/16/2020: increase Hydralazine, add losartan, start tube feeds, follow up CTH in AM, add silodosin and place cordova catheter, replace electrolytes PRN  5/17/2020: CT head stable, advance TF, add labetalol 100 q8hr, d/c cardene gtt  5/18/2020: required one prn dose of labetalol at 3 AM, scheduled hydralizine changed to q6 hrs, trial of puree consistency today although I do not feel patients swallowing is at point where can eat unattended or large quantities of food  5/19/20 Losartan increased to 100, hydralazine increased to 75. Resuming TF. Modified barium swallow test ordered. Nebs q6hr PRN. Transferred to Stroke Floor.     Interval History:    Losartan increased to 100, hydralazine increased to 75. Resuming TF. Modified barium swallow test ordered. Nebs q6hr PRN. Transferred to Stroke Floor.     Review of Systems  Constitutional: Negative for chills.   HENT: Positive for trouble swallowing and voice change.    Eyes: Negative for visual disturbance.    Respiratory: Negative for chest tightness.    Cardiovascular: Negative for chest pain.   Gastrointestinal: Negative for abdominal pain.   Musculoskeletal: Positive for neck stiffness.   Skin: Negative.    Neurological: Positive for facial asymmetry, speech difficulty and weakness.   Hematological: Negative.    Psychiatric/Behavioral: Negative.   Objective:     Vitals:  Temp: 98.3 °F (36.8 °C)  Pulse: 83  Rhythm: normal sinus rhythm  BP: (!) 147/74  MAP (mmHg): 105  Resp: (!) 21  SpO2: 97 %  O2 Device (Oxygen Therapy): room air    Temp  Min: 98 °F (36.7 °C)  Max: 98.5 °F (36.9 °C)  Pulse  Min: 77  Max: 95  BP  Min: 140/91  Max: 183/102  MAP (mmHg)  Min: 105  Max: 129  Resp  Min: 21  Max: 32  SpO2  Min: 92 %  Max: 97 %    05/18 0701 - 05/19 0700  In: 2654.2 [P.O.:90; I.V.:2314.2]  Out: 1415 [Urine:1415]   Unmeasured Output  Urine Occurrence: 0  Stool Occurrence: 1  Emesis Occurrence: 0  Pad Count: 2       Physical Exam  Constitutional: He appears well-developed.  NAD.  HENT:   Head: Normocephalic.   Eyes: Pupils are equal, round, and reactive to light.   Neck: No JVD present.   Cardiovascular: Normal heart sounds.   Pulmonary/Chest: Breath sounds normal.   Abdominal: Soft. Bowel sounds are normal.   Musculoskeletal: He exhibits edema.   Neurological: He is alert.   Dysarthria, but slightly improved.  Right leg slightly stronger then right arm, distal UE strength more intact  Right hemisensory deficit   Skin: Skin is warm. Capillary refill takes less than 2 seconds.   Psychiatric: He has a normal mood and affect.     Medications:  Continuous  sodium chloride 0.9% Last Rate: 100 mL/hr at 05/19/20 0905   Scheduled  enoxaparin 40 mg Daily   hydrALAZINE 75 mg Q6H   labetaloL 100 mg Q8H   [START ON 5/20/2020] losartan 100 mg Daily   polyethylene glycol 17 g Daily   senna-docusate 8.6-50 mg 1 tablet BID   silodosin 4 mg Daily   PRN  acetaminophen 650 mg Q4H PRN   albuterol sulfate 2.5 mg Q6H PRN   albuterol-ipratropium 3  mL Q4H PRN   hydrALAZINE 10 mg Q4H PRN   labetaloL 10 mg Q4H PRN   magnesium oxide 800 mg PRN   magnesium oxide 800 mg PRN   potassium chloride 10% 40 mEq PRN   potassium chloride 10% 40 mEq PRN   potassium chloride 10% 40 mEq PRN   potassium, sodium phosphates 2 packet PRN   potassium, sodium phosphates 2 packet PRN   potassium, sodium phosphates 2 packet PRN   sodium chloride 0.9% 10 mL PRN     Today I personally reviewed pertinent medications, lines/drains/airways, laboratory results.    Diet  Diet NPO      Assessment/Plan:     Neuro  * Nontraumatic intracerebral hemorrhage  -New onset of RSW and dysarthria in setting of /129 on presentation, no meds at home.  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   -Telemedicine-stroke done by Dr Looney  -Admitted to [facility]  -q1hr neurocheck  -SBP goal <160 On Cardene ggt, currently at goal  -Vascular neurology and STAT NSGY   -PT/INR, PTT   - EVD watch, sbp < 160, PT/OT  5/16/2020:  Increase hydralazine, add losartan, initiated TF, follow CTH tomorrow morning, place cordova cath and start silodosin, replace lytes  5/18/2020: CTH stable, off cardene, po hydralizine adjusted to q6 hrs  5/19/20 Increased po GARCIA meds: losartan 100: hydralazine 75. At goal. Stepdown to Vascular Neurology.      Dysarthria  SLP eval  05/18: gradual escalation of diet with close monitoring, would have low threshold for peg  5/19 Slight improvement. Modifies Swallow test ordered.    Hemiparesis, right  Due to stroke  PT/OT    Basal ganglia hemorrhage  IV extension but no EVD required    Cardiac/Vascular  Essential hypertension  -BP on arrival to outside facility 247/129, required Cardene ggt  -SBP goal <160  -d/c cardene gtt  - hydralazine 50 mg q8h  -  losartan 50 mg daily  - 5/17: add labetalol 100 q8hr  5/19 Hydralazine increased to 75mg; losartan increased to 100. At goal.          The patient is being Prophylaxed for:  Venous  Thromboembolism with: Chemical  Stress Ulcer with: Not Applicable   Ventilator Pneumonia with: not applicable    Activity Orders          Diet NPO: NPO starting at 05/19 1029    Commode at bedside starting at 05/14 1056        Full Code    Krystal Alex MD  Neurocritical Care  [facility]

## 2020-05-19 NOTE — SUBJECTIVE & OBJECTIVE
Neurologic Chief Complaint: Nontraumatic intracerebral hemorrhage    Subjective:     Interval History: Patient is seen for follow-up neurological assessment and treatment recommendations: NAEON. /180. Still with Rt side weakness.     HPI, Past Medical, Family, and Social History remains the same as documented in the initial encounter.     Review of Systems   Constitutional: Positive for appetite change. Negative for chills.   HENT: Positive for trouble swallowing.    Eyes: Negative for visual disturbance.   Respiratory: Negative for chest tightness.    Cardiovascular: Negative for chest pain.   Gastrointestinal: Negative for abdominal pain.   Genitourinary: Negative for flank pain and hematuria.   Skin: Negative.    Neurological: Positive for facial asymmetry, speech difficulty and weakness.   Hematological: Negative.    Psychiatric/Behavioral: Negative.      Scheduled Meds:   enoxaparin  40 mg Subcutaneous Daily    hydrALAZINE  75 mg Oral Q6H    labetaloL  100 mg Oral Q8H    [START ON 5/20/2020] losartan  100 mg Oral Daily    polyethylene glycol  17 g Oral Daily    senna-docusate 8.6-50 mg  1 tablet Oral BID    silodosin  4 mg Oral Daily     Continuous Infusions:   sodium chloride 0.9% 50 mL/hr at 05/19/20 1206     PRN Meds:acetaminophen, albuterol sulfate, albuterol-ipratropium, hydrALAZINE, labetaloL, magnesium oxide, magnesium oxide, potassium chloride 10%, potassium chloride 10%, potassium chloride 10%, potassium, sodium phosphates, potassium, sodium phosphates, potassium, sodium phosphates, sodium chloride 0.9%    Objective:     Vital Signs (Most Recent):  Temp: 98.2 °F (36.8 °C) (05/19/20 1130)  Pulse: 90 (05/19/20 1155)  Resp: (!) 21 (05/19/20 1005)  BP: (!) 160/93 (05/19/20 1130)  SpO2: (!) 94 % (05/19/20 1130)  BP Location: Left arm    Vital Signs Range (Last 24H):  Temp:  [98 °F (36.7 °C)-98.5 °F (36.9 °C)]   Pulse:  [77-95]   Resp:  [21-32]   BP: (140-183)/()   SpO2:  [92 %-97 %]    BP Location: Left arm    Physical Exam   Constitutional: He is oriented to person, place, and time. He appears well-developed and well-nourished. No distress.   HENT:   Head: Normocephalic and atraumatic.   Eyes: Pupils are equal, round, and reactive to light. EOM are normal.   Neck: No JVD present. No thyromegaly present.   Cardiovascular: Normal rate and regular rhythm.   Pulmonary/Chest: Effort normal and breath sounds normal. No respiratory distress. He has no wheezes.   Abdominal: He exhibits no distension. There is no tenderness. There is no guarding.   Musculoskeletal: He exhibits no edema, tenderness or deformity.   Neurological: He is alert and oriented to person, place, and time. A sensory deficit is present. He exhibits abnormal muscle tone.   Rt side weakness and dysarthria   Skin: No rash noted. No erythema.       Neurological Exam:   LOC: alert  Attention Span: Good   Language: No aphasia  Articulation: Dysarthria  Orientation: Not oriented to time  Visual Fields: Full  EOM (CN III, IV, VI): Full/intact  Pupils (CN II, III): PERRL  Facial Sensation (CN V): Normal  Reflexes: 2+ throughout  Motor: Arm left  Paresis: 1/5  Leg left  Normal 5/5  Arm right  Paresis: 1/5  Leg right Normal 5/5  Cebellar: No evidence of appendicular or axial ataxia  Sensation: Intact to light touch, temperature and vibration  Tone: Spasticity  RUE and RLE     Laboratory:  CMP:   Recent Labs   Lab 05/19/20  0203   CALCIUM 8.4*   ALBUMIN 3.2*   PROT 6.5      K 4.1   CO2 22*   *   BUN 25*   CREATININE 0.8   ALKPHOS 56   ALT 15   AST 20   BILITOT 0.5     CBC:   Recent Labs   Lab 05/19/20  0203   WBC 9.15   RBC 4.36*   HGB 12.5*   HCT 40.7      MCV 93   MCH 28.7   MCHC 30.7*     Lipid Panel:   Recent Labs   Lab 05/14/20  1251   CHOL 183  183   LDLCALC 117.2  117.2   HDL 54  54   TRIG 59  59     Hgb A1C:   Recent Labs   Lab 05/14/20  1251   HGBA1C 5.4  5.4       Diagnostic Results     Brain Imaging   CT  head:   - Left basal ganglia parenchymal hemorrhage with intraventricular extension again noted, the overall appearance is stable without evidence for significant interval detrimental change.     Cardiac Imaging   TTE:  EF 55%, normal RV diastolic function. No regurgitation or thrombus reported.

## 2020-05-19 NOTE — PROGRESS NOTES
[facility]  Vascular Neurology  Comprehensive Stroke Center  Progress Note    Assessment/Plan:     * Nontraumatic intracerebral hemorrhage  76 yo M with no known PMHx presents to [facility] 05/14/2020 transferred from OSH for large L thalamic and caudate ICH with intraventricular extension.  Of note, patient had SBP 240s documented at OSH.  Pt otherwise has no clear risk factors for ICH.  Does not appear to have cognitive deficits on exam, no reported concern for memory issues.  No medications, no EtOH use.  No recent trauma.  No unexplained weight loss or B symptoms.  Admitted to Neuro ICU w/ NSGY following.  Etiology likely hypertensive, rule out vascular malformation w/ further imaging.    - repeat CT Head in 5/17: stable without evidence for significant interval detrimental change.  -Off Cardene gtt, now on Losartan, Labetolol and Hydralazine.   -continue to monitor in [facility]    Antithrombotics for secondary stroke prevention:  None--ICH  Statins for secondary stroke prevention/HLD: LDL elevated--would not start statin acutely in setting of ICH, but could discuss low dose statin for reduction of stroke risk factors in stroke clinic follow up  Aggressive risk factor modification: HTN  Rehab efforts: PT/OT/SLP/PM&R  Diagnostics ordered/pending: CTA head to eval for vascular malformation  VTE prophylaxis: None: Reason for No Pharmacological VTE Prophylaxis: History of systemic or intracranial bleeding  BP parameters: ICH: SBP <140    Dysarthria  -2/2 ICH, continue SLP eval and treat    Hemiparesis, right  -2/2 ICH, continue PT eval and treat    Essential hypertension  -Stroke risk factor, likely etiology of ICH in this pt  -Goal SBP < 140, off Cardene gtt, now on Losartan, Labetolol and hydralazine.         05/14/2020:  Transfer[facility]/IVH from caudate and thalamus  05/15/2020:   Continues on cardene for SBP < 140, neurosurgery following.  05/16/2020:   Started back on  cardene for SBP < 140 this AM  05/18/2020:  Off Cardene drip, placed on Losartan and Hydralazine. Lovenox.  05/19/2010:  /180. Still with Rt side weakness.     STROKE DOCUMENTATION   Acute Stroke Times   Last Known Normal Time: 0300  Symptom Onset Date: 05/14/20  Symptom Onset Time: 0700  Stroke Team Called Time: 0806  Stroke Team Arrival Time: 0809  CT Interpretation Time: 0809  Decision to Treat Time for Alteplase: (Contraindicated 2/2 ICH)  Decision to Treat Time for IR: (no LVO)    NIH Scale:  1a. Level of Consciousness: 0-->Alert, keenly responsive  1b. LOC Questions: 0-->Answers both questions correctly  1c. LOC Commands: 0-->Performs both tasks correctly  2. Best Gaze: 0-->Normal  3. Visual: 0-->No visual loss  4. Facial Palsy: 0-->Normal symmetrical movements  5a. Motor Arm, Left: 0-->No drift, limb holds 90 (or 45) degrees for full 10 secs  5b. Motor Arm, Right: 2-->Some effort against gravity, limb cannot get to or maintain (if cued) 90 (or 45) degrees, drifts down to bed, but has some effort against gravity  6a. Motor Leg, Left: 0-->No drift, leg holds 30 degree position for full 5 secs  6b. Motor Leg, Right: 2-->Some effort against gravity, leg falls to bed by 5 secs, but has some effort against gravity  7. Limb Ataxia: 0-->Absent  8. Sensory: 0-->Normal, no sensory loss  9. Best Language: 0-->No aphasia, normal  10. Dysarthria: 1-->Mild-to-moderate dysarthria, patient slurs at least some words and, at worst, can be understood with some difficulty  11. Extinction and Inattention (formerly Neglect): 0-->No abnormality  Total (NIH Stroke Scale): 5       Modified [place] Score: 0  [place] Coma Scale:    ABCD2 Score:    [id number] Score:   HAS -BLED Score:   ICH Score:1  Hunt & Mccray Classification:      Hemorrhagic change of an Ischemic Stroke: Does this patient have an ischemic stroke with hemorrhagic changes? No     Neurologic Chief Complaint: Nontraumatic intracerebral hemorrhage    Subjective:      Interval History: Patient is seen for follow-up neurological assessment and treatment recommendations: LORY. /180. Still with Rt side weakness.     HPI, Past Medical, Family, and Social History remains the same as documented in the initial encounter.     Review of Systems   Constitutional: Positive for appetite change. Negative for chills.   HENT: Positive for trouble swallowing.    Eyes: Negative for visual disturbance.   Respiratory: Negative for chest tightness.    Cardiovascular: Negative for chest pain.   Gastrointestinal: Negative for abdominal pain.   Genitourinary: Negative for flank pain and hematuria.   Skin: Negative.    Neurological: Positive for facial asymmetry, speech difficulty and weakness.   Hematological: Negative.    Psychiatric/Behavioral: Negative.      Scheduled Meds:   enoxaparin  40 mg Subcutaneous Daily    hydrALAZINE  75 mg Oral Q6H    labetaloL  100 mg Oral Q8H    [START ON 5/20/2020] losartan  100 mg Oral Daily    polyethylene glycol  17 g Oral Daily    senna-docusate 8.6-50 mg  1 tablet Oral BID    silodosin  4 mg Oral Daily     Continuous Infusions:   sodium chloride 0.9% 50 mL/hr at 05/19/20 1206     PRN Meds:acetaminophen, albuterol sulfate, albuterol-ipratropium, hydrALAZINE, labetaloL, magnesium oxide, magnesium oxide, potassium chloride 10%, potassium chloride 10%, potassium chloride 10%, potassium, sodium phosphates, potassium, sodium phosphates, potassium, sodium phosphates, sodium chloride 0.9%    Objective:     Vital Signs (Most Recent):  Temp: 98.2 °F (36.8 °C) (05/19/20 1130)  Pulse: 90 (05/19/20 1155)  Resp: (!) 21 (05/19/20 1005)  BP: (!) 160/93 (05/19/20 1130)  SpO2: (!) 94 % (05/19/20 1130)  BP Location: Left arm    Vital Signs Range (Last 24H):  Temp:  [98 °F (36.7 °C)-98.5 °F (36.9 °C)]   Pulse:  [77-95]   Resp:  [21-32]   BP: (140-183)/()   SpO2:  [92 %-97 %]   BP Location: Left arm    Physical Exam   Constitutional: He is oriented to person,  place, and time. He appears well-developed and well-nourished. No distress.   HENT:   Head: Normocephalic and atraumatic.   Eyes: Pupils are equal, round, and reactive to light. EOM are normal.   Neck: No JVD present. No thyromegaly present.   Cardiovascular: Normal rate and regular rhythm.   Pulmonary/Chest: Effort normal and breath sounds normal. No respiratory distress. He has no wheezes.   Abdominal: He exhibits no distension. There is no tenderness. There is no guarding.   Musculoskeletal: He exhibits no edema, tenderness or deformity.   Neurological: He is alert and oriented to person, place, and time. A sensory deficit is present. He exhibits abnormal muscle tone.   Rt side weakness and dysarthria   Skin: No rash noted. No erythema.       Neurological Exam:   LOC: alert  Attention Span: Good   Language: No aphasia  Articulation: Dysarthria  Orientation: Not oriented to time  Visual Fields: Full  EOM (CN III, IV, VI): Full/intact  Pupils (CN II, III): PERRL  Facial Sensation (CN V): Normal  Reflexes: 2+ throughout  Motor: Arm left  Paresis: 1/5  Leg left  Normal 5/5  Arm right  Paresis: 1/5  Leg right Normal 5/5  Cebellar: No evidence of appendicular or axial ataxia  Sensation: Intact to light touch, temperature and vibration  Tone: Spasticity  RUE and RLE     Laboratory:  CMP:   Recent Labs   Lab 05/19/20  0203   CALCIUM 8.4*   ALBUMIN 3.2*   PROT 6.5      K 4.1   CO2 22*   *   BUN 25*   CREATININE 0.8   ALKPHOS 56   ALT 15   AST 20   BILITOT 0.5     CBC:   Recent Labs   Lab 05/19/20  0203   WBC 9.15   RBC 4.36*   HGB 12.5*   HCT 40.7      MCV 93   MCH 28.7   MCHC 30.7*     Lipid Panel:   Recent Labs   Lab 05/14/20  1251   CHOL 183  183   LDLCALC 117.2  117.2   HDL 54  54   TRIG 59  59     Hgb A1C:   Recent Labs   Lab 05/14/20  1251   HGBA1C 5.4  5.4       Diagnostic Results     Brain Imaging   CT head:   - Left basal ganglia parenchymal hemorrhage with intraventricular extension again  noted, the overall appearance is stable without evidence for significant interval detrimental change.     Cardiac Imaging   TTE:  EF 55%, normal RV diastolic function. No regurgitation or thrombus reported.      Holly Piedra MD  [facility]  Department of Vascular Neurology   [facility]

## 2020-05-19 NOTE — ASSESSMENT & PLAN NOTE
SLP eval  05/18: gradual escalation of diet with close monitoring, would have low threshold for peg  5/19 Slight improvement. Modifies Swallow test ordered.

## 2020-05-19 NOTE — PLAN OF CARE
Problem: SLP Goal  Goal: SLP Goal  Description  Speech Language Pathology Goals  Goals expected to be met by 5/21    1. Pt will participate in ongoing swallow assessment  2. Pt will utilize speech strategies with min cues  3. Pt will follow complex commands with 50% accy and min cues  4. Pt will complete problem solving task with 80% accy and min cues           Outcome: Ongoing, Progressing   Continue POC at this time. All goals remain appropriate.   Johnna Duran CCC-SLP  5/19/2020

## 2020-05-19 NOTE — NURSING TRANSFER
Nursing Transfer Note      5/19/2020     Transfer from 9070 to 905    Transfer via bed    Transfer with O2, cardiac monitoring     Transported by Nurse Wolfe    Medicines sent: no    Chart send with patient: Yes    Notified: spouse    Patient reassessed at: 05/19/29, 11 am    Upon arrival to floor: cardiac monitor applied, patient oriented to room, call bell in reach and bed in lowest position. Hand of at bedside to charge nurse Sheryl.Vital sign stable. No sign of distress. No neuro change. Report given to santosh. Patient has no personal item. Contacted spouse to advise of transfer.

## 2020-05-19 NOTE — SUBJECTIVE & OBJECTIVE
Interval History:    Losartan increased to 100, hydralazine increased to 75. Resuming TF. Modified barium swallow test ordered. Nebs q6hr PRN. Transferred to Stroke Floor.     Review of Systems  Constitutional: Negative for chills.   HENT: Positive for trouble swallowing and voice change.    Eyes: Negative for visual disturbance.   Respiratory: Negative for chest tightness.    Cardiovascular: Negative for chest pain.   Gastrointestinal: Negative for abdominal pain.   Musculoskeletal: Positive for neck stiffness.   Skin: Negative.    Neurological: Positive for facial asymmetry, speech difficulty and weakness.   Hematological: Negative.    Psychiatric/Behavioral: Negative.   Objective:     Vitals:  Temp: 98.3 °F (36.8 °C)  Pulse: 83  Rhythm: normal sinus rhythm  BP: (!) 147/74  MAP (mmHg): 105  Resp: (!) 21  SpO2: 97 %  O2 Device (Oxygen Therapy): room air    Temp  Min: 98 °F (36.7 °C)  Max: 98.5 °F (36.9 °C)  Pulse  Min: 77  Max: 95  BP  Min: 140/91  Max: 183/102  MAP (mmHg)  Min: 105  Max: 129  Resp  Min: 21  Max: 32  SpO2  Min: 92 %  Max: 97 %    05/18 0701 - 05/19 0700  In: 2654.2 [P.O.:90; I.V.:2314.2]  Out: 1415 [Urine:1415]   Unmeasured Output  Urine Occurrence: 0  Stool Occurrence: 1  Emesis Occurrence: 0  Pad Count: 2       Physical Exam  Constitutional: He appears well-developed. NAD.  HENT:   Head: Normocephalic.   Eyes: Pupils are equal, round, and reactive to light.   Neck: No JVD present.   Cardiovascular: Normal heart sounds.   Pulmonary/Chest: Breath sounds normal.   Abdominal: Soft. Bowel sounds are normal.   Musculoskeletal: He exhibits edema.   Neurological: He is alert.   Dysarthria, but slightly improved.  Right leg slightly stronger then right arm, distal UE strength more intact  Right hemisensory deficit   Skin: Skin is warm. Capillary refill takes less than 2 seconds.   Psychiatric: He has a normal mood and affect.     Medications:  Continuous  sodium chloride 0.9% Last Rate: 100 mL/hr at  05/19/20 0905   Scheduled  enoxaparin 40 mg Daily   hydrALAZINE 75 mg Q6H   labetaloL 100 mg Q8H   [START ON 5/20/2020] losartan 100 mg Daily   polyethylene glycol 17 g Daily   senna-docusate 8.6-50 mg 1 tablet BID   silodosin 4 mg Daily   PRN  acetaminophen 650 mg Q4H PRN   albuterol sulfate 2.5 mg Q6H PRN   albuterol-ipratropium 3 mL Q4H PRN   hydrALAZINE 10 mg Q4H PRN   labetaloL 10 mg Q4H PRN   magnesium oxide 800 mg PRN   magnesium oxide 800 mg PRN   potassium chloride 10% 40 mEq PRN   potassium chloride 10% 40 mEq PRN   potassium chloride 10% 40 mEq PRN   potassium, sodium phosphates 2 packet PRN   potassium, sodium phosphates 2 packet PRN   potassium, sodium phosphates 2 packet PRN   sodium chloride 0.9% 10 mL PRN     Today I personally reviewed pertinent medications, lines/drains/airways, laboratory results.    Diet  Diet NPO

## 2020-05-19 NOTE — PLAN OF CARE
Problem: Physical Therapy Goal  Goal: Physical Therapy Goal  Description  PT goals until 5/28/20    1. Pt supine to sit with minimal assist-not met  2. Pt sit to supine with minimal assist-not met  3. Pt sit to stand with hemiwalker with minimal assist-not met  4. Pt to perform gait 5ft with hemiwalker with max assist.-not met  5. Pt to transfer bed to/from bedside chair with hemiwalker with moderate assist.-not met  6. Pt to perform B LE exs in sitting or supine x 15 reps to strengthen B LE to improve functional mobility.-not met  7. Pt to sit on the EOB ~ 10 min with CGA to allow pt to perform functional activities-not met    Outcome: Ongoing, Progressing   Pt's goals remain appropriate and pt will continue to benefit from skilled PT services to work towards improved functional mobility including: bed mobility, transfers, and gait.   Marissa Guajardo, PT  5/19/2020

## 2020-05-19 NOTE — NURSING
POC reviewed with pt at 0500. Pt verbalized understanding. Questions and concerns addressed. No acute events overnight. Neuro exam stable. PRN Hydralazine and Labatelol given for SBP goal <160. SpO2 >92% on RA. Pt tolerated crushed medications. Pt progressing toward goals. Will continue to monitor. See flowsheets for full assessment and VS info

## 2020-05-19 NOTE — PLAN OF CARE
Goals remain appropriate. DELLA Evans 5/19/2020   Problem: Occupational Therapy Goal  Goal: Occupational Therapy Goal  Description  Goals to be met by: 5/29/2020     Patient will increase functional independence with ADLs by performing:    Grooming while standing with Moderate Assistance.  LB dressing with moderate assistance.  Toileting from bedside commode with Moderate Assistance for hygiene and clothing management.   Sitting at edge of bed x10 minutes with Minimal Assistance in preparation of functional seated grooming tasks. MET  *revised: with CGA x10 min and midline orientation maintained  Stand pivot transfers with Moderate Assistance.  Toilet transfer to bedside commode with Moderate Assistance.      Outcome: Ongoing, Progressing

## 2020-05-19 NOTE — ASSESSMENT & PLAN NOTE
-Stroke risk factor, likely etiology of ICH in this pt  -Goal SBP < 140, off Cardene gtt, now on Losartan, Labetolol and hydralazine.

## 2020-05-19 NOTE — ASSESSMENT & PLAN NOTE
-New onset of RSW and dysarthria in setting of /129 on presentation, no meds at home.  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   -Telemedicine-stroke done by Dr Looney  -Admitted to NCC  -q1hr neurocheck  -SBP goal <160 On Cardene ggt, currently at goal  -Vascular neurology and STAT NSGY   -PT/INR, PTT   - EVD watch, sbp < 160, PT/OT  5/16/2020:  Increase hydralazine, add losartan, initiated TF, follow CTH tomorrow morning, place cordova cath and start silodosin, replace lytes  5/18/2020: CTH stable, off cardene, po hydralizine adjusted to q6 hrs  5/19/20 Increased po GARCIA meds: losartan 100: hydralazine 75. At goal. Stepdown to Vascular Neurology.

## 2020-05-19 NOTE — PROGRESS NOTES
"[facility]  Adult Nutrition  Progress Note    SUMMARY       Recommendations    1.) Continue textured modified diet per SLP recommendations.    Add Boost Pudding to aid in nutritional intake.   2.) Suggest nocturnal feeds: Impact Peptide @ 55mL x 10hr (8pm-6am) to provide 825 kcals, 52gm protein and 424mL of free water.    MD to manage fluid.    Nocturnal TF to meet 48% EEN and 46% EPN.     If PO intakes >50% of meals x 48hr, suggest discontinuing TF regimen.   3.) If appetite continues to remain poor, suggest appetite stimulant.   4.) Add MVI.   5.) Update weight daily    Goals: 1.) Pt to consume/tolerate >50% of meals by follow up. 2.) Pt to achieve/tolerate >50% of EEN and EPN by follow up.   Nutrition Goal Status: progressing towards goal  Communication of RD Recs: reviewed with RN    Reason for Assessment    Reason For Assessment: RD follow-up  Diagnosis: hemorrhage  Relevant Medical History: None  Interdisciplinary Rounds: did not attend  General Information Comments: Diet advanced 5/18 with poor PO intake at this time. TF off to encourage intake. Pt sitting up in chair with brkfst being set up. RN to bedside aiding and encouraging intake. SLP following for texture requirements. No GI distress. Noted no new wt x4 days. NFPE to be completed at later date due to pt consuming meal. Pt continues to not meet malnutrition criteria at this time.   Nutrition Discharge Planning: Unable to determine at this time.     Nutrition Risk Screen    Nutrition Risk Screen: dysphagia or difficulty swallowing    Nutrition/Diet History    Spiritual, Cultural Beliefs, Sabianism Practices, Values that Affect Care: no  Food Allergies: NKFA  Factors Affecting Nutritional Intake: NPO    Anthropometrics    Temp: 98.3 °F (36.8 °C)  Height: 5' 10" (177.8 cm)  Height (inches): 70 in  Weight Method: Bed Scale  Weight: 95.3 kg (210 lb)  Weight (lb): 210 lb  Ideal Body Weight (IBW), Male: 166 lb  % Ideal Body Weight, Male " (lb): 126.51 %  BMI (Calculated): 30.1  BMI Grade: 30 - 34.9- obesity - grade I       Lab/Procedures/Meds    Pertinent Labs Reviewed: reviewed  Pertinent Labs Comments: Chl 111, CO2 22, BUN 25, Glu 116, Ca 8.4, albumin 3.2  Pertinent Medications Reviewed: reviewed  Pertinent Medications Comments: miralax, senna-docusate    Estimated/Assessed Needs    Weight Used For Calorie Calculations: 95.4 kg (210 lb 5.1 oz)  Energy Calorie Requirements (kcal): 1705  Energy Need Method: [place]-St Jeor(no AF)  Protein Requirements: 113-151(g/day)  Weight Used For Protein Calculations: 75.4 kg (166 lb 3.6 oz)(1.5-2.0 g/kg of IBW)     Estimated Fluid Requirement Method: RDA Method(or per MD)  RDA Method (mL): 1705         Nutrition Prescription Ordered    Current Diet Order: puree  Nutrition Order Comments: honey thick liquids  Current Nutrition Support Formula Ordered: Impact Peptide 1.5(not infusing at this time)  Current Nutrition Support Rate Ordered: 50 (ml)  Current Nutrition Support Frequency Ordered: mL/hr    Evaluation of Received Nutrient/Fluid Intake    IV Fluid (mL): 2400  I/O: +3.9L since admit  Energy Calories Required: not meeting needs  Protein Required: not meeting needs  Fluid Required: other (see comments)(or per MD)  Comments: LBM 5/18  Tolerance: tolerating  % Intake of Estimated Energy Needs: 0 - 25 %  % Meal Intake: 0 - 25 %    Nutrition Risk    Level of Risk/Frequency of Follow-up: moderate(x2/week)     Assessment and Plan  Nutrition Problem  Inadequate energy intake     Related to (etiology):   Textured modified diet with poor appetite     Signs and Symptoms (as evidenced by):   <75% of nutritional needs being met     Interventions(treatment strategy):  Collaboration of care with other providers  Referral of care  Modified diet-puree, honey thick  Enteral nutrition-Impact Peptide @ 55mL x 10hr     Nutrition Diagnosis Status:    Revised, Continues    Monitor and Evaluation    Food and Nutrient Intake: energy  intake, enteral nutrition intake  Food and Nutrient Adminstration: diet order, enteral and parenteral nutrition administration  Anthropometric Measurements: weight, weight change, body mass index  Biochemical Data, Medical Tests and Procedures: electrolyte and renal panel, gastrointestinal profile, glucose/endocrine profile, inflammatory profile  Nutrition-Focused Physical Findings: overall appearance     Malnutrition Assessment  Due to recent hospital wide restrictions to limit the transfer of (COVID-19), we are not performing any physical exams at this time. All S/S will be observational; NFPE to be performed at a future date.    Nutrition Follow-Up    RD Follow-up?: Yes

## 2020-05-19 NOTE — PT/OT/SLP PROGRESS
"Physical Therapy Treatment    Patient Name:  Brett Vega   MRN:  [id number]    Recommendations:     Discharge Recommendations:  rehabilitation facility   Discharge Equipment Recommendations: (TBD as pt progresses)   Barriers to discharge: Decreased caregiver support    Assessment:     Brett Vega is a 75 y.o. male admitted with a medical diagnosis of Nontraumatic intracerebral hemorrhage.  He presents with the following impairments/functional limitations:  weakness, impaired functional mobilty, impaired endurance, gait instability, decreased lower extremity function, decreased upper extremity function, impaired balance, impaired cardiopulmonary response to activity, decreased coordination . Pt is unsafe with functional mobility at this time due to pt requires max assist for bed mobility, max assist of 2 for transfers, and max assist of 2 for gait due to manual cues required at R knee and hip into extension when stepping with the L LE. Pt is motivated to progress with functional mobility.    Rehab Prognosis: Good; patient would benefit from acute skilled PT services to address these deficits and reach maximum level of function.    Recent Surgery: * No surgery found *      Plan:     During this hospitalization, patient to be seen 4 x/week to address the identified rehab impairments via gait training, therapeutic activities, therapeutic exercises, neuromuscular re-education and progress toward the following goals:    · Plan of Care Expires:  06/14/20    Subjective   "I have to go to the bathroom"    Pain/Comfort:  · Pain Rating 1: 0/10  · Pain Rating Post-Intervention 1: 0/10      Objective:     Communicated with nurse prior to session.  Patient found supine with blood pressure cuff, pulse ox (continuous), telemetry, peripheral IV, Condom Catheter, NG tube upon PT entry to room.     General Precautions: Standard, aspiration, NPO, fall, vision impaired(legally blind)   Orthopedic Precautions:N/A   Braces: N/A "     Functional Mobility:  · Bed Mobility:     · Rolling Right: maximal assistance  · Supine to Sit: maximal assistance  · Sit to Supine: maximal assistance and of 2 persons  · Transfers:     · Sit to Stand:  maximal assistance and of 2 persons with hand-held assist  · Bed to/from bedside Chair and bed to/from bedside commode: maximal assistance and of 2 persons with  hand-held assist  using  Squat Pivot  · Gait: pt performed pre gait stepping forward and back with L LE with manual cues to facilitate R hip and knee ext x 5 reps x 2 trials with HHA in L UE and max assist of 2.    AM-PAC 6 CLICK MOBILITY  Turning over in bed (including adjusting bedclothes, sheets and blankets)?: 2  Sitting down on and standing up from a chair with arms (e.g., wheelchair, bedside commode, etc.): 2  Moving from lying on back to sitting on the side of the bed?: 2  Moving to and from a bed to a chair (including a wheelchair)?: 2  Need to walk in hospital room?: 1  Climbing 3-5 steps with a railing?: 1  Basic Mobility Total Score: 10     Therapeutic Activities and Exercises:   pt sat on the EOB between standing trials ~ 10 min with max assist initially due to pt pushed to the R with his L UE causing LOB to the R requiring assist to remain upright. Pt sat on the EOB with CGA for 2 min with verbal cues for midline orientation.    Patient left supine (remained up in bedside chair ~ 2 hours prior to return to bed as above) with all lines intact, call button in reach, bed alarm on and nurse notified..    GOALS:   Multidisciplinary Problems     Physical Therapy Goals        Problem: Physical Therapy Goal    Goal Priority Disciplines Outcome Goal Variances Interventions   Physical Therapy Goal     PT, PT/OT Ongoing, Progressing     Description:  PT goals until 5/28/20    1. Pt supine to sit with minimal assist-not met  2. Pt sit to supine with minimal assist-not met  3. Pt sit to stand with hemiwalker with minimal assist-not met  4. Pt to perform  gait 5ft with hemiwalker with max assist.-not met  5. Pt to transfer bed to/from bedside chair with hemiwalker with moderate assist.-not met  6. Pt to perform B LE exs in sitting or supine x 15 reps to strengthen B LE to improve functional mobility.-not met  7. Pt to sit on the EOB ~ 10 min with CGA to allow pt to perform functional activities-not met                     Time Tracking:     PT Received On: 05/19/20  PT Start Time: 0822     PT Stop Time: 0911  PT Total Time (min): 49 min     Billable Minutes: Therapeutic Activity 49    Treatment Type: Treatment  PT/PTA: PT           Marissa Guajardo, PT  05/19/2020

## 2020-05-19 NOTE — PT/OT/SLP PROGRESS
Speech Language Pathology Treatment    Patient Name:  Brett Vega   MRN:  [id number]  Admitting Diagnosis: Nontraumatic intracerebral hemorrhage    Recommendations:                 General Recommendations:  Dysphagia therapy, Speech/language therapy and Cognitive-linguistic therapy  Diet recommendations:  Puree, Liquid Diet Level: Honey Thick   Aspiration Precautions: 1 bite/sip at a time, Feed only when awake/alert, HOB to 90 degrees, Small bites/sips and Standard aspiration precautions   General Precautions: Standard, aspiration, fall, NPO, vision impaired  Communication strategies: speech strategies    Subjective     Awake/alert      Pain/Comfort:  · Pain Rating 1: 0/10  · Pain Rating Post-Intervention 1: 0/10    Objective:     Has the patient been evaluated by SLP for swallowing?   Yes  Keep patient NPO? No   Current Respiratory Status: room air      Pt repositioned upright in bed for PO trials. He tolerated honey thick liquids x6  and puree x4 with mild oral residue which he was able to clear with liquid wash. Pt with increased work of breathing noted throughout conversation and PO trials; however spO2 remained at 95% throughout session. Moderate dysarthria noted throughout session requiring min cues. He completed OME x5 reps each with min cues. SLP provided education to pt on need for thickened liquids and pureed diet at this time. He verbalized understanding; however reinforcement recommended. Continue current diet recs of puree and honey thick liquids at this time.     Assessment:     Brett Vega is a 75 y.o. male with an SLP diagnosis of Dysphagia and Dysarthria.      Goals:   Multidisciplinary Problems     SLP Goals        Problem: SLP Goal    Goal Priority Disciplines Outcome   SLP Goal     SLP Ongoing, Progressing   Description:  Speech Language Pathology Goals  Goals expected to be met by 5/21    1. Pt will participate in ongoing swallow assessment  2. Pt will utilize speech strategies with min  cues  3. Pt will follow complex commands with 50% accy and min cues  4. Pt will complete problem solving task with 80% accy and min cues                            Plan:     · Patient to be seen:  5 x/week   · Plan of Care reviewed with:  patient   · SLP Follow-Up:  Yes       Discharge recommendations:  rehabilitation facility       Time Tracking:     SLP Treatment Date:   05/19/20  Speech Start Time:  0748  Speech Stop Time:  0803     Speech Total Time (min):  15 min    Billable Minutes: Speech Therapy Individual 7 and Treatment Swallowing Dysfunction 8    Johnna Duran CCC-SLP  05/19/2020

## 2020-05-19 NOTE — ASSESSMENT & PLAN NOTE
-BP on arrival to outside facility 247/129, required Cardene ggt  -SBP goal <160  -d/c cardene gtt  - hydralazine 50 mg q8h  -  losartan 50 mg daily  - 5/17: add labetalol 100 q8hr  5/19 Hydralazine increased to 75mg; losartan increased to 100. At goal.

## 2020-05-19 NOTE — PLAN OF CARE
Problem: Eating/Swallowing Impairment (Stroke, Hemorrhagic)  Goal: Oral Intake without Aspiration  Outcome: Ongoing, Progressing  Intervention: Optimize Eating and Swallowing  Flowsheets (Taken 5/19/2020 0946)  Nutrition Support Management: weight trending reviewed; other (see comments); tube feeding held     Problem: Feeding Intolerance (Enteral Nutrition)  Goal: Feeding Tolerance  Outcome: Ongoing, Progressing  Intervention: Prevent and Manage Feeding Intolerance  Flowsheets (Taken 5/19/2020 0946)  Nutrition Support Management: weight trending reviewed; other (see comments); tube feeding held     Recommendations     1.) Continue textured modified diet per SLP recommendations.               Add Boost Pudding to aid in nutritional intake.   2.) Suggest nocturnal feeds: Impact Peptide @ 55mL x 10hr (8pm-6am) to provide 825 kcals, 52gm protein and 424mL of free water.               MD to manage fluid.               Nocturnal TF to meet 48% EEN and 46% EPN.                           If PO intakes >50% of meals x 48hr, suggest discontinuing TF regimen.   3.) If appetite continues to remain poor, suggest appetite stimulant.   4.) Add MVI.   5.) Update weight daily     Goals: 1.) Pt to consume/tolerate >50% of meals by follow up. 2.) Pt to achieve/tolerate >50% of EEN and EPN by follow up.   Nutrition Goal Status: progressing towards goal  Communication of GRETEL Recs: reviewed with RN

## 2020-05-20 PROBLEM — R13.10 DYSPHAGIA: Status: ACTIVE | Noted: 2020-05-20

## 2020-05-20 PROBLEM — R06.03 RESPIRATORY DISTRESS: Status: ACTIVE | Noted: 2020-05-20

## 2020-05-20 LAB
ALBUMIN SERPL BCP-MCNC: 3.1 G/DL (ref 3.5–5.2)
ALBUMIN SERPL BCP-MCNC: 3.3 G/DL (ref 3.5–5.2)
ALP SERPL-CCNC: 56 U/L (ref 55–135)
ALP SERPL-CCNC: 64 U/L (ref 55–135)
ALT SERPL W/O P-5'-P-CCNC: 19 U/L (ref 10–44)
ALT SERPL W/O P-5'-P-CCNC: 21 U/L (ref 10–44)
ANION GAP SERPL CALC-SCNC: 6 MMOL/L (ref 8–16)
ANION GAP SERPL CALC-SCNC: 9 MMOL/L (ref 8–16)
AST SERPL-CCNC: 21 U/L (ref 10–40)
AST SERPL-CCNC: 24 U/L (ref 10–40)
BASOPHILS # BLD AUTO: 0.02 K/UL (ref 0–0.2)
BASOPHILS NFR BLD: 0.2 % (ref 0–1.9)
BILIRUB SERPL-MCNC: 0.4 MG/DL (ref 0.1–1)
BILIRUB SERPL-MCNC: 0.5 MG/DL (ref 0.1–1)
BNP SERPL-MCNC: 310 PG/ML (ref 0–99)
BUN SERPL-MCNC: 33 MG/DL (ref 8–23)
BUN SERPL-MCNC: 33 MG/DL (ref 8–23)
CALCIUM SERPL-MCNC: 8.7 MG/DL (ref 8.7–10.5)
CALCIUM SERPL-MCNC: 9.3 MG/DL (ref 8.7–10.5)
CHLORIDE SERPL-SCNC: 111 MMOL/L (ref 95–110)
CHLORIDE SERPL-SCNC: 111 MMOL/L (ref 95–110)
CO2 SERPL-SCNC: 24 MMOL/L (ref 23–29)
CO2 SERPL-SCNC: 27 MMOL/L (ref 23–29)
CREAT SERPL-MCNC: 0.8 MG/DL (ref 0.5–1.4)
CREAT SERPL-MCNC: 0.8 MG/DL (ref 0.5–1.4)
DIFFERENTIAL METHOD: ABNORMAL
EOSINOPHIL # BLD AUTO: 0 K/UL (ref 0–0.5)
EOSINOPHIL NFR BLD: 0.2 % (ref 0–8)
ERYTHROCYTE [DISTWIDTH] IN BLOOD BY AUTOMATED COUNT: 14 % (ref 11.5–14.5)
EST. GFR  (AFRICAN AMERICAN): >60 ML/MIN/1.73 M^2
EST. GFR  (AFRICAN AMERICAN): >60 ML/MIN/1.73 M^2
EST. GFR  (NON AFRICAN AMERICAN): >60 ML/MIN/1.73 M^2
EST. GFR  (NON AFRICAN AMERICAN): >60 ML/MIN/1.73 M^2
GLUCOSE SERPL-MCNC: 109 MG/DL (ref 70–110)
GLUCOSE SERPL-MCNC: 115 MG/DL (ref 70–110)
HCT VFR BLD AUTO: 39.1 % (ref 40–54)
HGB BLD-MCNC: 12 G/DL (ref 14–18)
IMM GRANULOCYTES # BLD AUTO: 0.03 K/UL (ref 0–0.04)
IMM GRANULOCYTES NFR BLD AUTO: 0.3 % (ref 0–0.5)
LYMPHOCYTES # BLD AUTO: 1 K/UL (ref 1–4.8)
LYMPHOCYTES NFR BLD: 11.1 % (ref 18–48)
MAGNESIUM SERPL-MCNC: 2.5 MG/DL (ref 1.6–2.6)
MCH RBC QN AUTO: 28.8 PG (ref 27–31)
MCHC RBC AUTO-ENTMCNC: 30.7 G/DL (ref 32–36)
MCV RBC AUTO: 94 FL (ref 82–98)
MONOCYTES # BLD AUTO: 0.9 K/UL (ref 0.3–1)
MONOCYTES NFR BLD: 9.5 % (ref 4–15)
NEUTROPHILS # BLD AUTO: 7 K/UL (ref 1.8–7.7)
NEUTROPHILS NFR BLD: 78.7 % (ref 38–73)
NRBC BLD-RTO: 0 /100 WBC
PHOSPHATE SERPL-MCNC: 3.4 MG/DL (ref 2.7–4.5)
PLATELET # BLD AUTO: 236 K/UL (ref 150–350)
PMV BLD AUTO: 10.2 FL (ref 9.2–12.9)
POTASSIUM SERPL-SCNC: 3.8 MMOL/L (ref 3.5–5.1)
POTASSIUM SERPL-SCNC: 3.9 MMOL/L (ref 3.5–5.1)
PROT SERPL-MCNC: 6.4 G/DL (ref 6–8.4)
PROT SERPL-MCNC: 6.6 G/DL (ref 6–8.4)
RBC # BLD AUTO: 4.16 M/UL (ref 4.6–6.2)
SODIUM SERPL-SCNC: 144 MMOL/L (ref 136–145)
SODIUM SERPL-SCNC: 144 MMOL/L (ref 136–145)
TROPONIN I SERPL DL<=0.01 NG/ML-MCNC: 0.12 NG/ML (ref 0–0.03)
TROPONIN I SERPL DL<=0.01 NG/ML-MCNC: 0.14 NG/ML (ref 0–0.03)
WBC # BLD AUTO: 8.92 K/UL (ref 3.9–12.7)

## 2020-05-20 PROCEDURE — 25000003 PHARM REV CODE 250: Performed by: NURSE PRACTITIONER

## 2020-05-20 PROCEDURE — 99232 PR SUBSEQUENT HOSPITAL CARE,LEVL II: ICD-10-PCS | Mod: ,,, | Performed by: NURSE PRACTITIONER

## 2020-05-20 PROCEDURE — 99233 SBSQ HOSP IP/OBS HIGH 50: CPT | Mod: GC,,, | Performed by: PSYCHIATRY & NEUROLOGY

## 2020-05-20 PROCEDURE — 83880 ASSAY OF NATRIURETIC PEPTIDE: CPT

## 2020-05-20 PROCEDURE — 84484 ASSAY OF TROPONIN QUANT: CPT | Mod: 91

## 2020-05-20 PROCEDURE — 92507 TX SP LANG VOICE COMM INDIV: CPT

## 2020-05-20 PROCEDURE — 63600175 PHARM REV CODE 636 W HCPCS: Performed by: PSYCHIATRY & NEUROLOGY

## 2020-05-20 PROCEDURE — 93010 EKG 12-LEAD: ICD-10-PCS | Mod: ,,, | Performed by: INTERNAL MEDICINE

## 2020-05-20 PROCEDURE — 25000003 PHARM REV CODE 250: Performed by: PSYCHIATRY & NEUROLOGY

## 2020-05-20 PROCEDURE — 11000001 HC ACUTE MED/SURG PRIVATE ROOM

## 2020-05-20 PROCEDURE — 84100 ASSAY OF PHOSPHORUS: CPT

## 2020-05-20 PROCEDURE — 25000242 PHARM REV CODE 250 ALT 637 W/ HCPCS: Performed by: NURSE PRACTITIONER

## 2020-05-20 PROCEDURE — 99223 PR INITIAL HOSPITAL CARE,LEVL III: ICD-10-PCS | Mod: GC,,, | Performed by: HOSPITALIST

## 2020-05-20 PROCEDURE — 99223 1ST HOSP IP/OBS HIGH 75: CPT | Mod: GC,,, | Performed by: HOSPITALIST

## 2020-05-20 PROCEDURE — 84484 ASSAY OF TROPONIN QUANT: CPT

## 2020-05-20 PROCEDURE — 94668 MNPJ CHEST WALL SBSQ: CPT

## 2020-05-20 PROCEDURE — 99232 SBSQ HOSP IP/OBS MODERATE 35: CPT | Mod: ,,, | Performed by: NURSE PRACTITIONER

## 2020-05-20 PROCEDURE — 93005 ELECTROCARDIOGRAM TRACING: CPT

## 2020-05-20 PROCEDURE — 80053 COMPREHEN METABOLIC PANEL: CPT

## 2020-05-20 PROCEDURE — 94761 N-INVAS EAR/PLS OXIMETRY MLT: CPT

## 2020-05-20 PROCEDURE — 99233 PR SUBSEQUENT HOSPITAL CARE,LEVL III: ICD-10-PCS | Mod: GC,,, | Performed by: PSYCHIATRY & NEUROLOGY

## 2020-05-20 PROCEDURE — 36415 COLL VENOUS BLD VENIPUNCTURE: CPT

## 2020-05-20 PROCEDURE — 80053 COMPREHEN METABOLIC PANEL: CPT | Mod: 91

## 2020-05-20 PROCEDURE — 93010 ELECTROCARDIOGRAM REPORT: CPT | Mod: ,,, | Performed by: INTERNAL MEDICINE

## 2020-05-20 PROCEDURE — 92526 ORAL FUNCTION THERAPY: CPT

## 2020-05-20 PROCEDURE — U0003 INFECTIOUS AGENT DETECTION BY NUCLEIC ACID (DNA OR RNA); SEVERE ACUTE RESPIRATORY SYNDROME CORONAVIRUS 2 (SARS-COV-2) (CORONAVIRUS DISEASE [COVID-19]), AMPLIFIED PROBE TECHNIQUE, MAKING USE OF HIGH THROUGHPUT TECHNOLOGIES AS DESCRIBED BY CMS-2020-01-R: HCPCS

## 2020-05-20 PROCEDURE — 85025 COMPLETE CBC W/AUTO DIFF WBC: CPT

## 2020-05-20 PROCEDURE — 83735 ASSAY OF MAGNESIUM: CPT

## 2020-05-20 RX ORDER — ALBUTEROL SULFATE 2.5 MG/.5ML
2.5 SOLUTION RESPIRATORY (INHALATION) EVERY 4 HOURS PRN
Status: DISCONTINUED | OUTPATIENT
Start: 2020-05-20 | End: 2020-05-20

## 2020-05-20 RX ORDER — FUROSEMIDE 10 MG/ML
20 INJECTION INTRAMUSCULAR; INTRAVENOUS ONCE
Status: COMPLETED | OUTPATIENT
Start: 2020-05-20 | End: 2020-05-20

## 2020-05-20 RX ORDER — AMLODIPINE BESYLATE 10 MG/1
10 TABLET ORAL DAILY
Status: DISCONTINUED | OUTPATIENT
Start: 2020-05-20 | End: 2020-05-25

## 2020-05-20 RX ORDER — HYDROCHLOROTHIAZIDE 12.5 MG/1
12.5 TABLET ORAL DAILY
Status: DISCONTINUED | OUTPATIENT
Start: 2020-05-21 | End: 2020-05-22

## 2020-05-20 RX ORDER — CARVEDILOL 12.5 MG/1
12.5 TABLET ORAL 2 TIMES DAILY
Status: DISCONTINUED | OUTPATIENT
Start: 2020-05-20 | End: 2020-05-26 | Stop reason: HOSPADM

## 2020-05-20 RX ADMIN — SILODOSIN 4 MG: 4 CAPSULE ORAL at 08:05

## 2020-05-20 RX ADMIN — HYDRALAZINE HYDROCHLORIDE 75 MG: 50 TABLET ORAL at 05:05

## 2020-05-20 RX ADMIN — ENOXAPARIN SODIUM 40 MG: 100 INJECTION SUBCUTANEOUS at 05:05

## 2020-05-20 RX ADMIN — STANDARDIZED SENNA CONCENTRATE AND DOCUSATE SODIUM 1 TABLET: 8.6; 5 TABLET ORAL at 08:05

## 2020-05-20 RX ADMIN — FUROSEMIDE 20 MG: 10 INJECTION, SOLUTION INTRAMUSCULAR; INTRAVENOUS at 10:05

## 2020-05-20 RX ADMIN — STANDARDIZED SENNA CONCENTRATE AND DOCUSATE SODIUM 1 TABLET: 8.6; 5 TABLET ORAL at 09:05

## 2020-05-20 RX ADMIN — AMLODIPINE BESYLATE 10 MG: 10 TABLET ORAL at 02:05

## 2020-05-20 RX ADMIN — SODIUM CHLORIDE: 0.9 INJECTION, SOLUTION INTRAVENOUS at 03:05

## 2020-05-20 RX ADMIN — LOSARTAN POTASSIUM 100 MG: 50 TABLET ORAL at 06:05

## 2020-05-20 RX ADMIN — ALBUTEROL SULFATE 2.5 MG: 2.5 SOLUTION RESPIRATORY (INHALATION) at 09:05

## 2020-05-20 RX ADMIN — LABETALOL HYDROCHLORIDE 100 MG: 100 TABLET, FILM COATED ORAL at 05:05

## 2020-05-20 RX ADMIN — HYDRALAZINE HYDROCHLORIDE 75 MG: 50 TABLET ORAL at 11:05

## 2020-05-20 RX ADMIN — CARVEDILOL 12.5 MG: 12.5 TABLET, FILM COATED ORAL at 09:05

## 2020-05-20 RX ADMIN — POLYETHYLENE GLYCOL 3350 17 G: 17 POWDER, FOR SOLUTION ORAL at 08:05

## 2020-05-20 NOTE — SUBJECTIVE & OBJECTIVE
History reviewed. No pertinent past medical history.  History reviewed. No pertinent surgical history.  Review of patient's allergies indicates:  No Known Allergies    Scheduled Medications:    enoxaparin  40 mg Subcutaneous Daily    hydrALAZINE  75 mg Oral Q6H    labetaloL  100 mg Oral Q8H    losartan  100 mg Oral Daily    polyethylene glycol  17 g Oral Daily    senna-docusate 8.6-50 mg  1 tablet Oral BID    silodosin  4 mg Oral Daily       PRN Medications: acetaminophen, albuterol sulfate, albuterol-ipratropium, hydrALAZINE, labetaloL, sodium chloride 0.9%    Family History     Unknown per patient        Tobacco Use    Smoking status: Former Smoker    Smokeless tobacco: Never Used   Substance and Sexual Activity    Alcohol use: Not on file    Drug use: Not on file    Sexual activity: Not on file     Review of Systems   Constitutional: Positive for activity change. Negative for fatigue and fever.   HENT: Positive for trouble swallowing. Negative for voice change.    Genitourinary: Negative for difficulty urinating and flank pain.   Musculoskeletal: Positive for gait problem. Negative for back pain.   Neurological: Positive for speech difficulty and weakness (R sided).   Psychiatric/Behavioral: Positive for confusion. Negative for agitation and behavioral problems.     Objective:     Vital Signs (Most Recent):  Temp: 97.9 °F (36.6 °C) (05/20/20 0754)  Pulse: 86 (05/20/20 0902)  Resp: (!) 23 (05/20/20 0902)  BP: 137/79 (05/20/20 0754)  SpO2: 97 % (05/20/20 0902)    Vital Signs (24h Range):  Temp:  [97.3 °F (36.3 °C)-99 °F (37.2 °C)] 97.9 °F (36.6 °C)  Pulse:  [57-96] 86  Resp:  [21-28] 23  SpO2:  [92 %-97 %] 97 %  BP: (137-183)/() 137/79     Body mass index is 30.13 kg/m².    Physical Exam   Constitutional: He appears well-developed and well-nourished.   HENT:   Head: Normocephalic and atraumatic.   Eyes: Pupils are equal, round, and reactive to light. EOM are normal.   Pulmonary/Chest: Effort  normal. No respiratory distress.   Abdominal:   NG tube in place and feeds running   Musculoskeletal:   - R sided weakness- RUE edema present   - LUE spontaneously moving    Neurological: He is alert.   - following commands  - confusion present   Skin: Skin is warm and dry.   Psychiatric: His behavior is normal.   Nursing note and vitals reviewed.    NEUROLOGICAL EXAMINATION:     CRANIAL NERVES     CN III, IV, VI   Pupils are equal, round, and reactive to light.  Extraocular motions are normal.       Diagnostic Results:   Labs: Reviewed  ECG: Reviewed  CT: Reviewed

## 2020-05-20 NOTE — PT/OT/SLP PROGRESS
Speech Language Pathology Treatment    Patient Name:  Brett Vega   MRN:  [id number]  Admitting Diagnosis: Nontraumatic intracerebral hemorrhage    Recommendations:                 General Recommendations:  Dysphagia therapy, Speech/language therapy and Cognitive-linguistic therapy  Diet recommendations:  Puree, Liquid Diet Level: Honey Thick   Aspiration Precautions: 1 bite/sip at a time, Feed only when awake/alert, HOB to 90 degrees, Small bites/sips and Standard aspiration precautions   General Precautions: Standard, pureed diet, honey thick, vision impaired, seizure, fall, aspiration  Communication strategies: speech strategies    Subjective     Awake/alert      Pain/Comfort:  Pain Rating 1: 0/10  Pain Rating Post-Intervention 1: 0/10    Objective:     Has the patient been evaluated by SLP for swallowing?   Yes  Keep patient NPO? No   Current Respiratory Status: room air      Pt repositioned upright in bed for PO trials. He tolerated honey thick liquids x6  and puree x4 oz with adequate bolus control. Pt with [place] overt clinical signs of airway compromise across trials; however pt did have increased WOB post trials during conversation. SLP unable to determine is WOB was related to dysphagia or poor breath support during conversation. Moderate dysarthria noted throughout session requiring min cues. He utilized speech strategies throughout session with occ cues. He was !90% intelligible. SLP provided education to pt on need for thickened liquids and pureed diet at this time. He verbalized understanding; however reinforcement recommended. Continue current diet recs of puree and honey thick liquids at this time.     Assessment:     Brett Vega is a 75 y.o. male with an SLP diagnosis of Dysphagia and Dysarthria.      Goals:   Multidisciplinary Problems     SLP Goals        Problem: SLP Goal    Goal Priority Disciplines Outcome   SLP Goal     SLP Ongoing, Progressing   Description:  Speech Language Pathology  Goals  Goals expected to be met by 5/21    1. Pt will participate in ongoing swallow assessment  2. Pt will utilize speech strategies with min cues  3. Pt will follow complex commands with 50% accy and min cues  4. Pt will complete problem solving task with 80% accy and min cues                            Plan:     · Patient to be seen:  5 x/week   · Plan of Care reviewed with:  patient   · SLP Follow-Up:  Yes       Discharge recommendations:  rehabilitation facility       Time Tracking:     SLP Treatment Date:   05/20/20  Speech Start Time:  1004  Speech Stop Time:  1019     Speech Total Time (min):  15 min    Billable Minutes: Speech Therapy Individual 7 and Treatment Swallowing Dysfunction 8    Johnna Duran CCC-SLP  05/20/2020

## 2020-05-20 NOTE — HPI
Brett Vega is a 75-year-old male with no PMHx. [place] [facility] [place]TH revealed L caudate/thalamic ICH w/ IVH. Repeat CTH 5/17 stable with no interval change. Hospital course complicated by HTN (on Losartan, Labetolol and Hydralazine). Per [facility] likely etiology of ICH is HTN.     Functional History: Patient lives with his wife in a 1 story home with no JO. Prior to admission, (I). DME: none.

## 2020-05-20 NOTE — CONSULTS
[facility] Medicine  Consult Note    Patient Name: Brett Vega  MRN: [id number]  Admission Date: 5/14/2020  Hospital Length of Stay: 6 days  Attending Physician: Dmitri Looney MD   Primary Care Provider: Elyse Parham MD     Patient information was obtained from patient and ER records.     Inpatient consult to [facility] Medicine - Stroke Comanagement  Consult performed by: Otto Alanis MD  Consult ordered by: Gina Chu NP        Subjective:     Principal Problem: Nontraumatic intracerebral hemorrhage    Chief Complaint: No chief complaint on file.     HPI:  74 yo male with no PMHX and no contact with health care his entire adult life transferred from [facility] after he presented there for new onset of dense right hemiparesis and marked dysarthria. Onset unclear but likely around 3am this morning. No recent illness. Legally blind. BP on arrival to outside facility 247/129. On Cardene ggt. 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 Telemedicine-stroke done by Dr Looney.  Currently pt awake, alert, oriented, following commands. No movement against gravity on the right side. On Cardene ggt.      Hospital Course: 05/15/2020: SBP < 160, add hydralazine, PT/OT, evd watch  5/16/2020: increase Hydralazine, add losartan, start tube feeds, follow up CTH in AM, add silodosin and place cordova catheter, replace electrolytes PRN  5/17/2020: CT head stable, advance TF, add labetalol 100 q8hr, d/c cardene gtt  5/18/2020: required one prn dose of labetalol at 3 AM, scheduled hydralizine changed to q6 hrs, trial of puree consistency today although I do not feel patients swallowing is at point where can eat unattended or large quantities of food  5/19/20 Losartan increased to 100, hydralazine increased to 75. Resuming TF. Modified barium swallow test ordered. Nebs q6hr PRN. Transferred to Stroke Floor.   05/20/20:  Patient on NG tube feeds, tachypnea and complaint of mild intermittent cough and associated dyspnea.    Medicine team on 05/20/20 consulted for respiratory distress.    Chart reviewed. tube feeds started on 05/19/20 at 10:32 am. patient remains afebrile, heart rate wnl, no leukocyte count. tachypnea noted 22-28 rate with accessory muscle use. Chest congestion, gargling, coarse breath sounds and bibasilar wheezing on exam. CXR unremarkable for lobar consolidation. no signs of volume over load on exam, Echo with no diastolic or systolic dysfunction    History reviewed. No pertinent past medical history.    History reviewed. No pertinent surgical history.    Review of patient's allergies indicates:  No Known Allergies    No current facility-administered medications on file prior to encounter.      No current outpatient medications on file prior to encounter.     Family History     None        Tobacco Use    Smoking status: Former Smoker    Smokeless tobacco: Never Used   Substance and Sexual Activity    Alcohol use: Not on file    Drug use: Not on file    Sexual activity: Not on file     Review of Systems   Constitutional: Positive for activity change and diaphoresis. Negative for appetite change, chills, fatigue, fever and unexpected weight change.   HENT: Negative for congestion, drooling, facial swelling and postnasal drip.    Eyes: Positive for visual disturbance. Negative for photophobia.   Respiratory: Positive for cough and shortness of breath. Negative for wheezing.    Cardiovascular: Negative for chest pain, palpitations and leg swelling.   Gastrointestinal: Negative for abdominal distention, abdominal pain, anal bleeding, blood in stool, constipation, diarrhea and nausea.   Endocrine: Negative for polyphagia and polyuria.   Musculoskeletal: Negative for arthralgias, back pain, gait problem and joint swelling.   Skin: Negative for pallor and rash.   Neurological: Negative for dizziness, facial asymmetry,  light-headedness and headaches.   Hematological: Negative for adenopathy. Does not bruise/bleed easily.     Objective:     Vital Signs (Most Recent):  Temp: 99.4 °F (37.4 °C) (05/20/20 1725)  Pulse: 87 (05/20/20 1725)  Resp: (!) 23 (05/20/20 1725)  BP: (!) 164/90 (05/20/20 1725)  SpO2: (!) 93 % (05/20/20 1725) Vital Signs (24h Range):  Temp:  [97.3 °F (36.3 °C)-99.4 °F (37.4 °C)] 99.4 °F (37.4 °C)  Pulse:  [57-96] 87  Resp:  [17-28] 23  SpO2:  [90 %-97 %] 93 %  BP: (137-183)/() 164/90     Weight: 95.3 kg (210 lb)  Body mass index is 30.13 kg/m².    Physical Exam   Constitutional:   Mild respiratory distress, using accessory muscle use for breathing   HENT:   Mouth/Throat: Oropharynx is clear and moist. No oropharyngeal exudate.   Eyes: Conjunctivae are normal. No scleral icterus.   Neck: Normal range of motion. Neck supple. No JVD present. No tracheal deviation present. No thyromegaly present.   Cardiovascular: Normal rate, regular rhythm, normal heart sounds and intact distal pulses. Exam reveals no gallop and no friction rub.   No murmur heard.  Pulmonary/Chest: No stridor. He is in respiratory distress. He has wheezes (expiratory wheezing bibasillar lung fields). He exhibits no tenderness.   Coarse breath sounds   Abdominal: Soft. Bowel sounds are normal. He exhibits no distension and no mass. There is no tenderness. There is no guarding.   Musculoskeletal: He exhibits no edema, tenderness or deformity.   Lymphadenopathy:     He has no cervical adenopathy.   Neurological:   Right sided weakness; upper and lower extremity, Dysarthria   Skin: Skin is warm. Capillary refill takes less than 2 seconds. No erythema. No pallor.     Significant Labs:   CBC:   Recent Labs   Lab 05/19/20  0203 05/20/20  0342   WBC 9.15 8.92   HGB 12.5* 12.0*   HCT 40.7 39.1*    236     CMP:   Recent Labs   Lab 05/19/20  0203 05/20/20  0342 05/20/20  1549    144 144   K 4.1 3.9 3.8   * 111* 111*   CO2 22* 24 27    * 109 115*   BUN 25* 33* 33*   CREATININE 0.8 0.8 0.8   CALCIUM 8.4* 8.7 9.3   PROT 6.5 6.4 6.6   ALBUMIN 3.2* 3.1* 3.3*   BILITOT 0.5 0.5 0.4   ALKPHOS 56 56 64   AST 20 21 24   ALT 15 19 21   ANIONGAP 7* 9 6*   EGFRNONAA >60.0 >60.0 >60.0       Significant Imaging: I have reviewed all pertinent imaging results/findings within the past 24 hours.    Assessment/Plan:     Respiratory Distress  75 years old male admitted for caudate and thalamic hemorraghic stroke initially in [facility] due to IV antihypertensives, stepped down to hospital medicine on 05/19/20.   -- tube feeds started on 05/19/20 at 10:32 am  -- patient remains afebrile, heart rate wnl, no leukocyte count  -- tachypnea noted 22-28 rate with accessory muscle use  -- Chest congestion, gargling, coarse breath sounds and bibasilar wheezing on exam  -- CXR unremarkable for lobar consolidation  -- no signs of volume over load on exam, Echo with no diastolic or systolic dysfunction  Assessment: Respiratory distress likely due to aspiration vs atelectasis vs viral pneumonia  Plan:  -- Rule out Coronavirus 19 infection  -- pulmonary toilet with IS, chest PT, BID suction   -- Scheduled nebulization after r/o COVID19   -- If no improvement or fever spike can consider empiric antibiotics for atypical bacterial PNA.   -- Hold tube feeds, and oral feeds for now. Aspiration precautions, HOB > 60 degrees    VTE Risk Mitigation (From admission, onward)         Ordered     enoxaparin injection 40 mg  Daily      05/18/20 1306     Reason for No Pharmacological VTE Prophylaxis  Once     Question:  Reasons:  Answer:  Risk of Bleeding    05/14/20 1101     IP VTE HIGH RISK PATIENT  Once      05/14/20 1101     Place sequential compression device  Until discontinued      05/14/20 1101              Thank you for your consult. I will follow-up with patient. Please contact us if you have any additional questions.     Patient discussed and seen with Dr. Purdy. Further reccs per  the attending addendum.     Otto Alanis MD   Internal Medicine PGY 3  Department of [facility] Medicine   [facility]

## 2020-05-20 NOTE — NURSING
Pt sitting with HOB up watching television.  Pt observed with SOB.  Pt wheezing and crackles to lungs noted.  Sat at 93% room air.  Medical neuro student at bedside and notified.  Pt denies feeling discomfort or SOB.  Will continue to monitor.  Call light within reach, bed in lowest position.     1500 Pt in bed with HOB up.  Pt continue with rapid shallow breathing.  Lasix given as ordered.  Pt denies SOB or discomfort.  O2 Sat at 92% room air.  C-Ray done this am.  MD notified.  Wife call requesting update on .  MD notified.

## 2020-05-20 NOTE — ASSESSMENT & PLAN NOTE
74 yo M with no known PMHx presents to [facility] 05/14/2020 transferred from OSH for large L thalamic and caudate ICH with intraventricular extension.  Of note, patient had SBP 240s documented at OSH.  Pt otherwise has no clear risk factors for ICH.  Does not appear to have cognitive deficits on exam, no reported concern for memory issues.  No medications, no EtOH use.  No recent trauma.  No unexplained weight loss or B symptoms.  Admitted to Neuro ICU w/ NSGY following.  Etiology likely hypertensive, rule out vascular malformation w/ further imaging. Repeat CT Head in 5/17: stable without evidence for significant interval detrimental change.    Exam stable.    Antithrombotics for secondary stroke prevention:  None--ICH  Statins for secondary stroke prevention/HLD: LDL elevated--would not start statin acutely in setting of ICH, but could discuss low dose statin for reduction of stroke risk factors in stroke clinic follow up  Aggressive risk factor modification: HTN  Rehab efforts: PT/OT/SLP/PM&R  Diagnostics ordered/pending: CTA head to eval for vascular malformation  VTE prophylaxis: None: Reason for No Pharmacological VTE Prophylaxis: History of systemic or intracranial bleeding  BP parameters: ICH: SBP <140

## 2020-05-20 NOTE — SUBJECTIVE & OBJECTIVE
History reviewed. No pertinent past medical history.    History reviewed. No pertinent surgical history.    Review of patient's allergies indicates:  No Known Allergies    No current facility-administered medications on file prior to encounter.      No current outpatient medications on file prior to encounter.     Family History     None        Tobacco Use    Smoking status: Former Smoker    Smokeless tobacco: Never Used   Substance and Sexual Activity    Alcohol use: Not on file    Drug use: Not on file    Sexual activity: Not on file     Review of Systems   Constitutional: Positive for activity change and diaphoresis. Negative for appetite change, chills, fatigue, fever and unexpected weight change.   HENT: Negative for congestion, drooling, facial swelling and postnasal drip.    Eyes: Positive for visual disturbance. Negative for photophobia.   Respiratory: Positive for cough and shortness of breath. Negative for wheezing.    Cardiovascular: Negative for chest pain, palpitations and leg swelling.   Gastrointestinal: Negative for abdominal distention, abdominal pain, anal bleeding, blood in stool, constipation, diarrhea and nausea.   Endocrine: Negative for polyphagia and polyuria.   Musculoskeletal: Negative for arthralgias, back pain, gait problem and joint swelling.   Skin: Negative for pallor and rash.   Neurological: Negative for dizziness, facial asymmetry, light-headedness and headaches.   Hematological: Negative for adenopathy. Does not bruise/bleed easily.     Objective:     Vital Signs (Most Recent):  Temp: 99.4 °F (37.4 °C) (05/20/20 1725)  Pulse: 87 (05/20/20 1725)  Resp: (!) 23 (05/20/20 1725)  BP: (!) 164/90 (05/20/20 1725)  SpO2: (!) 93 % (05/20/20 1725) Vital Signs (24h Range):  Temp:  [97.3 °F (36.3 °C)-99.4 °F (37.4 °C)] 99.4 °F (37.4 °C)  Pulse:  [57-96] 87  Resp:  [17-28] 23  SpO2:  [90 %-97 %] 93 %  BP: (137-183)/() 164/90     Weight: 95.3 kg (210 lb)  Body mass index is 30.13  kg/m².    Physical Exam   Constitutional:   Mild respiratory distress, using accessory muscle use for breathing   HENT:   Mouth/Throat: Oropharynx is clear and moist. No oropharyngeal exudate.   Eyes: Conjunctivae are normal. No scleral icterus.   Neck: Normal range of motion. Neck supple. No JVD present. No tracheal deviation present. No thyromegaly present.   Cardiovascular: Normal rate, regular rhythm, normal heart sounds and intact distal pulses. Exam reveals no gallop and no friction rub.   No murmur heard.  Pulmonary/Chest: No stridor. He is in respiratory distress. He has wheezes (expiratory wheezing bibasillar lung fields). He exhibits no tenderness.   Coarse breath sounds   Abdominal: Soft. Bowel sounds are normal. He exhibits no distension and no mass. There is no tenderness. There is no guarding.   Musculoskeletal: He exhibits no edema, tenderness or deformity.   Lymphadenopathy:     He has no cervical adenopathy.   Neurological:   Right sided weakness; upper and lower extremity, Dysarthria   Skin: Skin is warm. Capillary refill takes less than 2 seconds. No erythema. No pallor.     Significant Labs:   CBC:   Recent Labs   Lab 05/19/20  0203 05/20/20  0342   WBC 9.15 8.92   HGB 12.5* 12.0*   HCT 40.7 39.1*    236     CMP:   Recent Labs   Lab 05/19/20  0203 05/20/20  0342 05/20/20  1549    144 144   K 4.1 3.9 3.8   * 111* 111*   CO2 22* 24 27   * 109 115*   BUN 25* 33* 33*   CREATININE 0.8 0.8 0.8   CALCIUM 8.4* 8.7 9.3   PROT 6.5 6.4 6.6   ALBUMIN 3.2* 3.1* 3.3*   BILITOT 0.5 0.5 0.4   ALKPHOS 56 56 64   AST 20 21 24   ALT 15 19 21   ANIONGAP 7* 9 6*   EGFRNONAA >60.0 >60.0 >60.0       Significant Imaging: I have reviewed all pertinent imaging results/findings within the past 24 hours.

## 2020-05-20 NOTE — HOSPITAL COURSE
5/15/20: Evaluated by PT & OT. Bed mobility maxA- totalA x 2 ppl. LBD totalA.    5/19/20: participated w/ PT & OT. Bed mobility maxA- maxA x 2 pl. Sit tos tand maxA x 2 ppl. pt performed pre gait stepping forward and back with L LE. Feeding & Grooming Savita.    5/25/20: Participated w/ PT & OT. Bed mobility mod- maxA. Sit to stand maxA. performed pre gait stepping activities x 3 sets of 5-8 reps. Grooming Savita. LBD & UBD maxA

## 2020-05-20 NOTE — HPI
Mr. Vega is a 74 yo male with no significant past medical history transferred from [facility] after he presented there for new onset of dense right hemiparesis and marked dysarthria. Onset unclear but likely around 3am on 05/14/20. No recent illness. Legally blind. BP on arrival to outside facility 247/129. Cardene ggt initiated. 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 Telemedicine-stroke done by Neurology and patient transferred to [facility] on 05/15/20 and admitted under NCC service. Upon arrival, pt awake, alert, oriented, following commands. No movement against gravity on the right side. On Cardene ggt.      Hospital Course: 05/15/2020: SBP < 160, add hydralazine, PT/OT, evd watch  5/16/2020: increase Hydralazine, add losartan, start tube feeds, follow up CTH in AM, add silodosin and place cordova catheter, replace electrolytes PRN  5/17/2020: CT head stable, advance TF, add labetalol 100 q8hr, d/c cardene gtt  5/18/2020: required one prn dose of labetalol at 3 AM, scheduled hydralizine changed to q6 hrs, trial of puree consistency today although I do not feel patients swallowing is at point where can eat unattended or large quantities of food  5/19/20 Losartan increased to 100, hydralazine increased to 75. Resuming TF. Modified barium swallow test ordered. Nebs q6hr PRN. Transferred to Stroke Floor.   05/20/20: Patient on NG tube feeds, tachypnea and complaint of mild intermittent cough and associated dyspnea.     Hospital Medicine team consulted on 05/20/20 for respiratory distress.  Patient remains afebrile, heart rate wnl, no leukocyte count. tachypnea noted 22-28 rate with accessory muscle use. Chest congestion, gargling, coarse breath sounds and bibasilar wheezing on exam. CXR unremarkable for lobar consolidation, however reveals pulmonary congestive changes. no signs of volume over load on exam, Echo with no diastolic  or systolic dysfunction. Primary team gave 20 IV lasix with UOP 2 L in 24 hours. COVID19 negative 05/20/20 05/21/20: Patient seen and examined at the bedside. Continues to be verbally responsive with mild dysarthria and right sided upper and lower extremity weakness. Oral and tube feeds on hold. Tachyapnea, with accessary muscles use but notes improvement in breathing compared to yesterday. Requiring 2.0 L nasal canula to maintain sats 95%. Scheduled for modified barium swallow study today.   05/22/20: No apparent concerns. Patient failed the swallowing study and today is s/p PEG tube placement per IR, patient tolerated the procedure well. Compared to yesterday, respiratory status is better. Remains NPO. RR 20-25. US LE doppler is negative for DVT. CXR unremarkable for ileus. CT abd w/ contrast consistent with infrarenal AAA 7 x 6.7 cm. Receiving scheduled neutralization. Lasix 20 mg given yesterday with UOP 2.225 L.   05/23/20: Pt states SOB is improved today. Vascular surgery recommending outpatient follow up for infrarenal AAA. Receiving scheduled nebs, patient encouraged to use IS. UOP 1.4 L yesterday. CTA negative for acute PE.   05/24/20: Reports no concerns. PEG tube feeds ongoing. Afebrile. Patient RR 21-25, however, oxygen requirements decreased to 2.0 L. Lasix 20 mg er G tube,  cc/24 hours. Sodium in the am is 153, free water deficit 4.2 L. Patient has been getting 250 cc fluids via PEG tube every 6 hours, will increase to 500 cc every 6 hours and repeat BMP at 1800.     Interval History (05/25/20): Patient seen and examined at the bedside. No apparent concerns. Respiratory status improved. Wean off oxygen as tolerated. RR 17-20 per minute. No lasix given yesterday. UOP 1.45 L/24 hours. PEG tube feeds ongoing at goal 60 mL/hr with free water flushes increased to 750 cc every 6 hours. Patient had a bowel movement yesterday per the nursing staff.

## 2020-05-20 NOTE — CONSULTS
[facility]  Physical Medicine & Rehab  Consult Note    Patient Name: Brett Vega  MRN: [id number]  Admission Date: 5/14/2020  Hospital Length of Stay: 6 days  Attending Physician: Dmitri Looney MD     Inpatient consult to Physical Medicine & Rehabilitation  Consult performed by: Hanna Bailey NP  Consult requested by:  Dmitri Looney MD    Collaborating Physician: Ai Kerr MD  Reason for Consult:  Assess rehabilitation needs     Consults  Subjective:     Principal Problem: Nontraumatic intracerebral hemorrhage    HPI: Brett Vega is a 75-year-old male with no PMHx. Patient transferred to [facility] [place]14 for CTH revealed L caudate/thalamic ICH w/ IVH. Repeat CTH 5/17 stable with no interval change. Hospital course complicated by HTN (on Losartan, Labetolol and Hydralazine). Per [facility] Neurology likely etiology of ICH is HTN.     Functional History: Patient lives with his wife in a 1 story home with no JO. Prior to admission, (I). DME: none.     Hospital Course:   5/15/20: Evaluated by PT & OT. Bed mobility maxA- totalA x 2 ppl. LBD totalA.   5/19/20: participated w/ PT & OT. Bed mobility maxA- maxA x 2 pl. Sit tos tand maxA x 2 ppl. pt performed pre gait stepping forward and back with L LE. Feeding & Grooming Savita.     History reviewed. No pertinent past medical history.  History reviewed. No pertinent surgical history.  Review of patient's allergies indicates:  No Known Allergies    Scheduled Medications:    enoxaparin  40 mg Subcutaneous Daily    hydrALAZINE  75 mg Oral Q6H    labetaloL  100 mg Oral Q8H    losartan  100 mg Oral Daily    polyethylene glycol  17 g Oral Daily    senna-docusate 8.6-50 mg  1 tablet Oral BID    silodosin  4 mg Oral Daily       PRN Medications: acetaminophen, albuterol sulfate, albuterol-ipratropium, hydrALAZINE, labetaloL, sodium chloride 0.9%    Family History     Unknown per patient        Tobacco Use    Smoking status: Former Smoker     Smokeless tobacco: Never Used   Substance and Sexual Activity    Alcohol use: Not on file    Drug use: Not on file    Sexual activity: Not on file     Review of Systems   Constitutional: Positive for activity change. Negative for fatigue and fever.   HENT: Positive for trouble swallowing. Negative for voice change.    Genitourinary: Negative for difficulty urinating and flank pain.   Musculoskeletal: Positive for gait problem. Negative for back pain.   Neurological: Positive for speech difficulty and weakness (R sided).   Psychiatric/Behavioral: Positive for confusion. Negative for agitation and behavioral problems.     Objective:     Vital Signs (Most Recent):  Temp: 97.9 °F (36.6 °C) (05/20/20 0754)  Pulse: 86 (05/20/20 0902)  Resp: (!) 23 (05/20/20 0902)  BP: 137/79 (05/20/20 0754)  SpO2: 97 % (05/20/20 0902)    Vital Signs (24h Range):  Temp:  [97.3 °F (36.3 °C)-99 °F (37.2 °C)] 97.9 °F (36.6 °C)  Pulse:  [57-96] 86  Resp:  [21-28] 23  SpO2:  [92 %-97 %] 97 %  BP: (137-183)/() 137/79     Body mass index is 30.13 kg/m².    Physical Exam   Constitutional: He appears well-developed and well-nourished.   HENT:   Head: Normocephalic and atraumatic.   Eyes: Pupils are equal, round, and reactive to light. EOM are normal.   Pulmonary/Chest: Effort normal. No respiratory distress.   Abdominal:   NG tube in place and feeds running   Musculoskeletal:   - R sided weakness- RUE edema present   - LUE spontaneously moving    Neurological: He is alert.   - following commands  - confusion present   Skin: Skin is warm and dry.   Psychiatric: His behavior is normal.   Nursing note and vitals reviewed.      Diagnostic Results:   Labs: Reviewed  ECG: Reviewed  CT: Reviewed    Assessment/Plan:     * Nontraumatic intracerebral hemorrhage  - Per [facility] Neurology likely etiology of ICH is HTN.   - Recommend swallow study    - Related to prolonged/acute hospital course.     Recommendations  -  Encourage mobility, OOB in chair at  least 3 hours per day, and early ambulation as appropriate  -  PT/OT evaluate and treat  -  Pain management  -  Monitor for and prevent skin breakdown and pressure ulcers  · Early mobility, repositioning/weight shifting every 20-30 minutes when sitting, turn patient every 2 hours, proper mattress/overlay and chair cushioning, pressure relief/heel protector boots  -  DVT prophylaxis    -  Reviewed discharge options (IP rehab, SNF, HH therapy, and OP therapy)    Hemiparesis, right  - PT and OT eval and treat     Likely rehab candidate once medically stable. Will follow progress with therapy.     Thank you for your consult.     Hanna Bailey NP  Department of Physical Medicine & Rehab  [facility]

## 2020-05-20 NOTE — SUBJECTIVE & OBJECTIVE
Neurologic Chief Complaint: Nontraumatic intracerebral hemorrhage    Subjective:     Interval History: Patient is seen for follow-up neurological assessment and treatment recommendations: Stepped down overnight. Increased work of breathing, tachypnic.  IVF discontinued.  Crackles in bases and expiratory wheezes.  CXR with pulmonary edema.  Lasix given with good output but no sustained improvement of breathing.  Currently COVID rule out.  IM following. Blood pressure elevated - changed to carvedilol 12.5mg BID, Norvasc 10mg daily, with plans to start HCTZ tomorrow.  Continuing Hydralazine until BP stable and can change to PRN only. TF on hold due to respiratory status.  Scheduled for MBSS tomorrow if able to tolerate.    HPI, Past Medical, Family, and Social History remains the same as documented in the initial encounter.     Review of Systems   Constitutional: Positive for appetite change. Negative for chills and fever.   HENT: Positive for trouble swallowing.    Eyes: Negative for visual disturbance.   Respiratory: Positive for cough, shortness of breath and wheezing. Negative for chest tightness.    Cardiovascular: Negative for chest pain.   Gastrointestinal: Negative for abdominal pain.   Genitourinary: Negative for flank pain and hematuria.   Skin: Negative.    Neurological: Positive for facial asymmetry, speech difficulty and weakness.   Hematological: Negative.    Psychiatric/Behavioral: Negative.      Scheduled Meds:   amLODIPine  10 mg Oral Daily    carvediloL  12.5 mg Oral BID    enoxaparin  40 mg Subcutaneous Daily    hydrALAZINE  75 mg Oral Q6H    [START ON 5/21/2020] hydroCHLOROthiazide  12.5 mg Oral Daily    losartan  100 mg Oral Daily    polyethylene glycol  17 g Oral Daily    senna-docusate 8.6-50 mg  1 tablet Oral BID    silodosin  4 mg Oral Daily     Continuous Infusions:    PRN Meds:acetaminophen, albuterol-ipratropium, hydrALAZINE, labetaloL, sodium chloride 0.9%    Objective:     Vital  Signs (Most Recent):  Temp: 99.4 °F (37.4 °C) (05/20/20 1725)  Pulse: 87 (05/20/20 1725)  Resp: (!) 23 (05/20/20 1725)  BP: (!) 164/90 (05/20/20 1725)  SpO2: (!) 93 % (05/20/20 1725)  BP Location: Left arm    Vital Signs Range (Last 24H):  Temp:  [97.3 °F (36.3 °C)-99.4 °F (37.4 °C)]   Pulse:  [57-96]   Resp:  [17-28]   BP: (137-183)/()   SpO2:  [90 %-97 %]   BP Location: Left arm    Physical Exam   Constitutional: He appears well-developed and well-nourished.   Cardiovascular: Normal rate and regular rhythm.   Pulmonary/Chest: Tachypnea noted. He has wheezes. He has rales.   Skin: No rash noted. No erythema.       Neurological Exam:   LOC: alert  Attention Span: Good   Language: No aphasia  Articulation: Dysarthria  Orientation: Not oriented to time  Visual Fields: Full  EOM (CN III, IV, VI): Full/intact  Pupils (CN II, III): PERRL  Facial Sensation (CN V): Normal  Facial Movement (CN VII): Symmetric facial expression    Motor: Arm left  Normal 5/5  Leg left  Paresis: 4/5  Arm right  Paresis: 1/5  Leg right Paresis: 1/5  Sensation: Jaron-hypoesthesia right  Tone: Spasticity  RUE and RLE     Laboratory:  CMP:   Recent Labs   Lab 05/20/20  1549   CALCIUM 9.3   ALBUMIN 3.3*   PROT 6.6      K 3.8   CO2 27   *   BUN 33*   CREATININE 0.8   ALKPHOS 64   ALT 21   AST 24   BILITOT 0.4     CBC:   Recent Labs   Lab 05/20/20  0342   WBC 8.92   RBC 4.16*   HGB 12.0*   HCT 39.1*      MCV 94   MCH 28.8   MCHC 30.7*     Lipid Panel:   Recent Labs   Lab 05/14/20  1251   CHOL 183  183   LDLCALC 117.2  117.2   HDL 54  54   TRIG 59  59     Hgb A1C:   Recent Labs   Lab 05/14/20  1251   HGBA1C 5.4  5.4       Diagnostic Results     Brain Imaging   CT head:   - Left basal ganglia parenchymal hemorrhage with intraventricular extension again noted, the overall appearance is stable without evidence for significant interval detrimental change.     Cardiac Imaging   TTE:  EF 55%, normal RV diastolic function. No  regurgitation or thrombus reported.

## 2020-05-20 NOTE — PLAN OF CARE
Problem: SLP Goal  Goal: SLP Goal  Description  Speech Language Pathology Goals  Goals expected to be met by 5/21    1. Pt will participate in ongoing swallow assessment  2. Pt will utilize speech strategies with min cues  3. Pt will follow complex commands with 50% accy and min cues  4. Pt will complete problem solving task with 80% accy and min cues           Outcome: Ongoing, Progressing   Continue POC at this time, all goals remain appropriate.   Johnna Duran CCC-SLP  5/20/2020

## 2020-05-20 NOTE — ASSESSMENT & PLAN NOTE
- Per [facility] likely etiology of ICH is HTN.     - Related to prolonged/acute hospital course.     Recommendations  -  Encourage mobility, OOB in chair at least 3 hours per day, and early ambulation as appropriate  -  PT/OT evaluate and treat  -  Pain management  -  Monitor for and prevent skin breakdown and pressure ulcers  · Early mobility, repositioning/weight shifting every 20-30 minutes when sitting, turn patient every 2 hours, proper mattress/overlay and chair cushioning, pressure relief/heel protector boots  -  DVT prophylaxis    -  Reviewed discharge options (IP rehab, SNF, HH therapy, and OP therapy)

## 2020-05-20 NOTE — ASSESSMENT & PLAN NOTE
Patient with increased work of breathing, tachypnea, and hypoxia.  Chest x-ray with pulmonary edema.  Patient afebrile with no leukocytosis  -IVF discontinued.   -Lasix 20 mg given with good output, but no sustained improvement noted  -BNP - 310  -Troponin mildly elevated but trending down - most likely ischemic demand  -EKG with no acute changes.  -Consult to IM - appreciate recommendations   -Plan for deep suctioning, oxygen, chest PT, and rule out COVID   -Will continue to follow

## 2020-05-20 NOTE — NURSING
"1818 Pt in bed with HOB elevated.  Pt A/O able to voice needs.  Speech slurred at times, but pt will repeat if unable to understand.  Oxygen @ 2 LPM via NC. Sats at 93% at this time.  Pt continue with rapid shallow respirations.  MD aware of respiratory status.  Covid 19 collected as ordered.  Feedings to NG tube stopped as ordered.  Pt state "I feel better."  Pt with fan at bedside per request.  Will continue to monitor patient.  Pt put on isolation as ordered.  Call light within reach.  Bed in lowest position.  Pt instructed to call for assistance if he feels worst or needs assistance.  Will report pt status to oncoming shift.   "

## 2020-05-20 NOTE — ASSESSMENT & PLAN NOTE
NG tube in place  Plans for MBSS tomorrow if patient can tolerate  TF on hold due to respiratory status

## 2020-05-20 NOTE — ASSESSMENT & PLAN NOTE
-Stroke risk factor, likely etiology of ICH in this pt  - Med regiment adjusted:   Coreg 12.5mg BID   Norvasc 10mg Daily   HCTZ 12.5mg Daily - to start 5/21 (was given Lasix 20 x 1 dose 5/20)  Continuing hydralazine until BP stabilized and will change to PRN only

## 2020-05-20 NOTE — PLAN OF CARE
Problem: Adult Inpatient Plan of Care  Goal: Plan of Care Review  Outcome: Ongoing, Progressing  Flowsheets (Taken 5/20/2020 0319)  Plan of Care Reviewed With: patient  Goal: Optimal Comfort and Wellbeing  Outcome: Ongoing, Progressing  Intervention: Provide Person-Centered Care  Flowsheets (Taken 5/20/2020 0319)  Trust Relationship/Rapport: care explained; thoughts/feelings acknowledged; choices provided     Problem: Fall Injury Risk  Goal: Absence of Fall and Fall-Related Injury  Outcome: Ongoing, Progressing  Intervention: Promote Injury-Free Environment  Flowsheets (Taken 5/20/2020 0319)  Safety Promotion/Fall Prevention: assistive device/personal item within reach; bed alarm set; lighting adjusted; medications reviewed; nonskid shoes/socks when out of bed  Environmental Safety Modification: assistive device/personal items within reach; clutter free environment maintained; lighting adjusted     POC reviewed with patient. All questions and concerns reviewed. Fall/safety precautions implemented and maintained. [place] care provided. TF provided and maintained. No acute events noted this shift. Please see flow sheet for full assessment and vitals. Bed locked in lowest position. Call bell within reach. Will continue to monitor.     Problem: Patient Care Overview  Goal: Plan of Care Review  Outcome: Ongoing (interventions implemented as appropriate)  Patient voiding and ambulating without difficulty. Pain well controlled with scheduled and PRN pain meds. Fundus firm, midline with light lochia. Incision site CDI.. VSS. Significant other at bedside; parents responding to infant cues and bonding appropriately.

## 2020-05-21 LAB
ALBUMIN SERPL BCP-MCNC: 3.3 G/DL (ref 3.5–5.2)
ALP SERPL-CCNC: 66 U/L (ref 55–135)
ALT SERPL W/O P-5'-P-CCNC: 22 U/L (ref 10–44)
ANION GAP SERPL CALC-SCNC: 9 MMOL/L (ref 8–16)
AST SERPL-CCNC: 24 U/L (ref 10–40)
BASOPHILS # BLD AUTO: 0.02 K/UL (ref 0–0.2)
BASOPHILS NFR BLD: 0.2 % (ref 0–1.9)
BILIRUB SERPL-MCNC: 0.6 MG/DL (ref 0.1–1)
BUN SERPL-MCNC: 32 MG/DL (ref 8–23)
CALCIUM SERPL-MCNC: 9 MG/DL (ref 8.7–10.5)
CHLORIDE SERPL-SCNC: 110 MMOL/L (ref 95–110)
CO2 SERPL-SCNC: 25 MMOL/L (ref 23–29)
CREAT SERPL-MCNC: 0.8 MG/DL (ref 0.5–1.4)
DIFFERENTIAL METHOD: ABNORMAL
EOSINOPHIL # BLD AUTO: 0 K/UL (ref 0–0.5)
EOSINOPHIL NFR BLD: 0.2 % (ref 0–8)
ERYTHROCYTE [DISTWIDTH] IN BLOOD BY AUTOMATED COUNT: 13.9 % (ref 11.5–14.5)
EST. GFR  (AFRICAN AMERICAN): >60 ML/MIN/1.73 M^2
EST. GFR  (NON AFRICAN AMERICAN): >60 ML/MIN/1.73 M^2
GLUCOSE SERPL-MCNC: 113 MG/DL (ref 70–110)
HCT VFR BLD AUTO: 41.7 % (ref 40–54)
HGB BLD-MCNC: 12.6 G/DL (ref 14–18)
IMM GRANULOCYTES # BLD AUTO: 0.02 K/UL (ref 0–0.04)
IMM GRANULOCYTES NFR BLD AUTO: 0.2 % (ref 0–0.5)
LYMPHOCYTES # BLD AUTO: 1 K/UL (ref 1–4.8)
LYMPHOCYTES NFR BLD: 9.8 % (ref 18–48)
MAGNESIUM SERPL-MCNC: 2.4 MG/DL (ref 1.6–2.6)
MCH RBC QN AUTO: 28.4 PG (ref 27–31)
MCHC RBC AUTO-ENTMCNC: 30.2 G/DL (ref 32–36)
MCV RBC AUTO: 94 FL (ref 82–98)
MONOCYTES # BLD AUTO: 0.9 K/UL (ref 0.3–1)
MONOCYTES NFR BLD: 8.9 % (ref 4–15)
NEUTROPHILS # BLD AUTO: 8.2 K/UL (ref 1.8–7.7)
NEUTROPHILS NFR BLD: 80.7 % (ref 38–73)
NRBC BLD-RTO: 0 /100 WBC
PHOSPHATE SERPL-MCNC: 3.5 MG/DL (ref 2.7–4.5)
PLATELET # BLD AUTO: 289 K/UL (ref 150–350)
PMV BLD AUTO: 10 FL (ref 9.2–12.9)
POTASSIUM SERPL-SCNC: 3.9 MMOL/L (ref 3.5–5.1)
PROT SERPL-MCNC: 6.8 G/DL (ref 6–8.4)
RBC # BLD AUTO: 4.43 M/UL (ref 4.6–6.2)
SARS-COV-2 RNA RESP QL NAA+PROBE: NOT DETECTED
SODIUM SERPL-SCNC: 144 MMOL/L (ref 136–145)
WBC # BLD AUTO: 10.12 K/UL (ref 3.9–12.7)

## 2020-05-21 PROCEDURE — 11000001 HC ACUTE MED/SURG PRIVATE ROOM

## 2020-05-21 PROCEDURE — 25000003 PHARM REV CODE 250: Performed by: PSYCHIATRY & NEUROLOGY

## 2020-05-21 PROCEDURE — 80053 COMPREHEN METABOLIC PANEL: CPT

## 2020-05-21 PROCEDURE — 63600175 PHARM REV CODE 636 W HCPCS: Performed by: STUDENT IN AN ORGANIZED HEALTH CARE EDUCATION/TRAINING PROGRAM

## 2020-05-21 PROCEDURE — 97530 THERAPEUTIC ACTIVITIES: CPT | Mod: CQ

## 2020-05-21 PROCEDURE — 99233 SBSQ HOSP IP/OBS HIGH 50: CPT | Mod: ,,, | Performed by: HOSPITALIST

## 2020-05-21 PROCEDURE — 36415 COLL VENOUS BLD VENIPUNCTURE: CPT

## 2020-05-21 PROCEDURE — 63600175 PHARM REV CODE 636 W HCPCS: Performed by: PSYCHIATRY & NEUROLOGY

## 2020-05-21 PROCEDURE — 97535 SELF CARE MNGMENT TRAINING: CPT

## 2020-05-21 PROCEDURE — 84100 ASSAY OF PHOSPHORUS: CPT

## 2020-05-21 PROCEDURE — 85025 COMPLETE CBC W/AUTO DIFF WBC: CPT

## 2020-05-21 PROCEDURE — 99233 PR SUBSEQUENT HOSPITAL CARE,LEVL III: ICD-10-PCS | Mod: GC,,, | Performed by: PSYCHIATRY & NEUROLOGY

## 2020-05-21 PROCEDURE — 25000003 PHARM REV CODE 250: Performed by: NURSE PRACTITIONER

## 2020-05-21 PROCEDURE — 99233 SBSQ HOSP IP/OBS HIGH 50: CPT | Mod: GC,,, | Performed by: PSYCHIATRY & NEUROLOGY

## 2020-05-21 PROCEDURE — 97112 NEUROMUSCULAR REEDUCATION: CPT | Mod: CQ

## 2020-05-21 PROCEDURE — 92611 MOTION FLUOROSCOPY/SWALLOW: CPT

## 2020-05-21 PROCEDURE — 99233 PR SUBSEQUENT HOSPITAL CARE,LEVL III: ICD-10-PCS | Mod: ,,, | Performed by: HOSPITALIST

## 2020-05-21 PROCEDURE — 83735 ASSAY OF MAGNESIUM: CPT

## 2020-05-21 RX ORDER — IPRATROPIUM BROMIDE 0.5 MG/2.5ML
0.5 SOLUTION RESPIRATORY (INHALATION) EVERY 8 HOURS
Status: DISCONTINUED | OUTPATIENT
Start: 2020-05-21 | End: 2020-05-25

## 2020-05-21 RX ORDER — FUROSEMIDE 10 MG/ML
20 INJECTION INTRAMUSCULAR; INTRAVENOUS ONCE
Status: COMPLETED | OUTPATIENT
Start: 2020-05-21 | End: 2020-05-21

## 2020-05-21 RX ORDER — LEVALBUTEROL INHALATION SOLUTION 0.63 MG/3ML
0.63 SOLUTION RESPIRATORY (INHALATION) EVERY 8 HOURS
Status: DISCONTINUED | OUTPATIENT
Start: 2020-05-21 | End: 2020-05-25

## 2020-05-21 RX ADMIN — LOSARTAN POTASSIUM 100 MG: 50 TABLET ORAL at 08:05

## 2020-05-21 RX ADMIN — STANDARDIZED SENNA CONCENTRATE AND DOCUSATE SODIUM 1 TABLET: 8.6; 5 TABLET ORAL at 09:05

## 2020-05-21 RX ADMIN — ENOXAPARIN SODIUM 40 MG: 100 INJECTION SUBCUTANEOUS at 05:05

## 2020-05-21 RX ADMIN — CARVEDILOL 12.5 MG: 12.5 TABLET, FILM COATED ORAL at 08:05

## 2020-05-21 RX ADMIN — POLYETHYLENE GLYCOL 3350 17 G: 17 POWDER, FOR SOLUTION ORAL at 08:05

## 2020-05-21 RX ADMIN — CARVEDILOL 12.5 MG: 12.5 TABLET, FILM COATED ORAL at 09:05

## 2020-05-21 RX ADMIN — HYDRALAZINE HYDROCHLORIDE 75 MG: 50 TABLET ORAL at 06:05

## 2020-05-21 RX ADMIN — AMLODIPINE BESYLATE 10 MG: 10 TABLET ORAL at 08:05

## 2020-05-21 RX ADMIN — HYDROCHLOROTHIAZIDE 12.5 MG: 12.5 TABLET ORAL at 08:05

## 2020-05-21 RX ADMIN — HYDRALAZINE HYDROCHLORIDE 75 MG: 50 TABLET ORAL at 05:05

## 2020-05-21 RX ADMIN — STANDARDIZED SENNA CONCENTRATE AND DOCUSATE SODIUM 1 TABLET: 8.6; 5 TABLET ORAL at 08:05

## 2020-05-21 RX ADMIN — HYDRALAZINE HYDROCHLORIDE 75 MG: 50 TABLET ORAL at 12:05

## 2020-05-21 RX ADMIN — FUROSEMIDE 20 MG: 10 INJECTION, SOLUTION INTRAMUSCULAR; INTRAVENOUS at 10:05

## 2020-05-21 RX ADMIN — SILODOSIN 4 MG: 4 CAPSULE ORAL at 08:05

## 2020-05-21 NOTE — SUBJECTIVE & OBJECTIVE
Neurologic Chief Complaint: Nontraumatic intracerebral hemorrhage    Subjective:     Interval History: Patient is seen for follow-up neurological assessment and treatment recommendations: tachypnea persisting but patient sating well on 3L NC and denies SOB. Failed MBSS, plans to discuss peg tube placement    HPI, Past Medical, Family, and Social History remains the same as documented in the initial encounter.     Review of Systems   Constitutional: Negative for chills and fever.   HENT: Positive for trouble swallowing.    Eyes: Negative for visual disturbance.   Respiratory: Positive for cough. Negative for chest tightness and shortness of breath.    Cardiovascular: Negative for chest pain.   Gastrointestinal: Negative for abdominal pain, constipation and vomiting.   Skin: Negative.    Neurological: Positive for facial asymmetry, speech difficulty and weakness.   Hematological: Negative.    Psychiatric/Behavioral: Negative.      Scheduled Meds:   amLODIPine  10 mg Oral Daily    carvediloL  12.5 mg Oral BID    enoxaparin  40 mg Subcutaneous Daily    hydrALAZINE  75 mg Oral Q6H    hydroCHLOROthiazide  12.5 mg Oral Daily    ipratropium  0.5 mg Nebulization Q8H    levalbuterol  0.63 mg Nebulization Q8H    losartan  100 mg Oral Daily    polyethylene glycol  17 g Oral Daily    senna-docusate 8.6-50 mg  1 tablet Oral BID    silodosin  4 mg Oral Daily     Continuous Infusions:    PRN Meds:acetaminophen, albuterol-ipratropium, hydrALAZINE, labetaloL, sodium chloride 0.9%    Objective:     Vital Signs (Most Recent):  Temp: 98 °F (36.7 °C) (05/21/20 1228)  Pulse: 77 (05/21/20 1228)  Resp: 20 (05/21/20 1228)  BP: (!) 172/90 (05/21/20 1228)  SpO2: (!) 94 % (05/21/20 1228)  BP Location: Left arm    Vital Signs Range (Last 24H):  Temp:  [97.7 °F (36.5 °C)-99.4 °F (37.4 °C)]   Pulse:  []   Resp:  [18-29]   BP: (153-172)/(89-97)   SpO2:  [90 %-97 %]   BP Location: Left arm    Physical Exam   Constitutional: He is  oriented to person, place, and time. He appears well-developed and well-nourished. No distress.   HENT:   Head: Normocephalic and atraumatic.   Cardiovascular: Normal rate.   Pulmonary/Chest: Tachypnea noted. No respiratory distress.   Neurological: He is alert and oriented to person, place, and time.   Skin: Skin is warm and dry.   Vitals reviewed.      Neurological Exam:   LOC: alert  Attention Span: Good   Language: No aphasia  Articulation: Dysarthria  Orientation: Not oriented to time  Visual Fields: Full  EOM (CN III, IV, VI): Full/intact  Facial Movement (CN VII): Symmetric facial expression    Motor: Arm left  Normal 5/5  Leg left  Paresis: 4/5  Arm right  Paresis: 1/5  Leg right Paresis: 1/5  Sensation: Intact to light touch, temperature and vibration  Tone: Spasticity  RUE and RLE     Laboratory:  CMP:   Recent Labs   Lab 05/21/20  0507   CALCIUM 9.0   ALBUMIN 3.3*   PROT 6.8      K 3.9   CO2 25      BUN 32*   CREATININE 0.8   ALKPHOS 66   ALT 22   AST 24   BILITOT 0.6     CBC:   Recent Labs   Lab 05/21/20  0507   WBC 10.12   RBC 4.43*   HGB 12.6*   HCT 41.7      MCV 94   MCH 28.4   MCHC 30.2*     Lipid Panel:   No results for input(s): CHOL, LDLCALC, HDL, TRIG in the last 168 hours.  Hgb A1C:   No results for input(s): HGBA1C in the last 168 hours.    Diagnostic Results     Brain Imaging   CT head (05/17/20)   - Left basal ganglia parenchymal hemorrhage with intraventricular extension again noted, the overall appearance is stable without evidence for significant interval detrimental change.     CT Head (05/14/20 1399)  Redemonstration of an acute parenchymal hemorrhage centered within the left basal ganglia with intraventricular extension.  Persistent ventricular prominence, stable in size when compared to prior study, in keeping with evolving hydrocephalus.    No evidence of new hemorrhage    CT Head (05/14/20 8866)  Evolving acute parenchymal hemorrhage centered left basal ganglia  with intraventricular extension.    Continued intraventricular hemorrhage with mild distention lateral and 3rd ventricles concerning for component of hydrocephalus.    No significant increased volume of hemorrhage.      CT head (05/14/20 0813):  Evidence of intraparenchymal hemorrhage centered in the region of the posterior limb of the left internal capsule with intraventricular extension as above.      Cardiac Imaging   TTE (05/15/20):  · Technically difficult portable study  · Normal left ventricular systolic function. The estimated ejection fraction is 55%.  · Normal right ventricular systolic function.  · Indeterminate left ventricular diastolic function.  · Normal central venous pressure (3 mmHg).

## 2020-05-21 NOTE — PLAN OF CARE
Referral sent to [facility] for 2nd choice       05/21/20 1429   Post-Acute Status   Post-Acute Authorization Placement   Post-Acute Placement Status Referrals Sent   Discharge Delays None known at this time   Discharge Plan   Discharge Plan A Rehab   Discharge Plan B Rehab     Nina Ivy RN  Case Management  Ext: [phone]  05/21/2020  2:30 PM

## 2020-05-21 NOTE — SUBJECTIVE & OBJECTIVE
Interval History (05/21/20): Patient seen and examined at the bedside. Continues to be verbally responsive with mild dysarthria and right sided upper and lower extremity weakness. Oral and tube feeds on hold. Tachyapnea, with accessary muscles use but notes improvement in breathing compared to yesterday. Requiring 2.0 L nasal canula to maintain sats 95%. Scheduled for modified barium swallow study today.     Review of Systems  Objective:     Vital Signs (Most Recent):  Temp: 97.7 °F (36.5 °C) (05/21/20 0454)  Pulse: 92 (05/21/20 0751)  Resp: (!) 23 (05/21/20 0700)  BP: (!) 160/92 (05/21/20 0700)  SpO2: 95 % (05/21/20 0700) Vital Signs (24h Range):  Temp:  [97.7 °F (36.5 °C)-99.4 °F (37.4 °C)] 97.7 °F (36.5 °C)  Pulse:  [] 92  Resp:  [17-29] 23  SpO2:  [90 %-97 %] 95 %  BP: (140-167)/(83-97) 160/92     Weight: 95.3 kg (210 lb)  Body mass index is 30.13 kg/m².    Intake/Output Summary (Last 24 hours) at 5/21/2020 1001  Last data filed at 5/21/2020 0400  Gross per 24 hour   Intake 140 ml   Output 1965 ml   Net -1825 ml      Physical Exam    Significant Labs:   CBC:   Recent Labs   Lab 05/20/20  0342 05/21/20  0507   WBC 8.92 10.12   HGB 12.0* 12.6*   HCT 39.1* 41.7    289     CMP:   Recent Labs   Lab 05/20/20  0342 05/20/20  1549 05/21/20  0507    144 144   K 3.9 3.8 3.9   * 111* 110   CO2 24 27 25    115* 113*   BUN 33* 33* 32*   CREATININE 0.8 0.8 0.8   CALCIUM 8.7 9.3 9.0   PROT 6.4 6.6 6.8   ALBUMIN 3.1* 3.3* 3.3*   BILITOT 0.5 0.4 0.6   ALKPHOS 56 64 66   AST 21 24 24   ALT 19 21 22   ANIONGAP 9 6* 9   EGFRNONAA >60.0 >60.0 >60.0     Magnesium:   Recent Labs   Lab 05/20/20  0342 05/21/20  0507   MG 2.5 2.4       Significant Imaging: I have reviewed all pertinent imaging results/findings within the past 24 hours.

## 2020-05-21 NOTE — ASSESSMENT & PLAN NOTE
-Stroke risk factor, likely etiology of ICH in this pt  - Med regiment adjusted:   Coreg 12.5mg BID   Norvasc 10mg Daily   HCTZ 12.5mg Daily   Continuing hydralazine until BP stabilized and will change to PRN only

## 2020-05-21 NOTE — PLAN OF CARE
Problem: SLP Goal  Goal: SLP Goal  Description  Speech Language Pathology Goals  Goals expected to be met by 5/28:    1. Pt will participate in ongoing swallow assessment  2. Pt will participate in ongoing trials of puree and honey thick liquids without overt s/s of aspiration or airway compromise.  3. Pt will utilize speech strategies with min cues  4. Pt will follow complex commands with 50% accy and min cues  5. Pt will complete problem solving task with 80% accy and min cues  6. Pt will participate in swallow exercises to facilitate strengthening of the swallowing mechanism in freq. of x5-10 reps.  Outcome: Ongoing, Progressing     Pt participated in MBSS this date. Pt to remain NPO at this time and continue alternate means of nutrition/hydration/medication. ST to continue to follow for trials of puree/honey-thick liquids. Continue ST per POC. See note for details.     Razia Camarillo, CF-SLP  5/21/2020

## 2020-05-21 NOTE — PLAN OF CARE
05/20/20: Patient on NG tube feeds, tachypnea and complaint of mild intermittent cough and associated dyspnea.     05/21/20 1016   Discharge Reassessment   Assessment Type Discharge Planning Reassessment   Provided patient/caregiver education on the expected discharge date and the discharge plan No   Do you have any problems affording any of your prescribed medications? No   Discharge Plan A Rehab   Discharge Plan B Skilled Nursing Facility   DME Needed Upon Discharge  other (see comments)  (tbd)   Patient choice form signed by patient/caregiver N/A   Anticipated Discharge Disposition Rehab   Can the patient/caregiver answer the patient profile reliably? Yes, cognitively intact   How does the patient rate their overall health at the present time? Fair   Describe the patient's ability to walk at the present time. Walks with the help of equipment   How often would a person be available to care for the patient? Occasionally   Post-Acute Status   Post-Acute Authorization Placement   Post-Acute Placement Status Awaiting Internal Medical Clearance   Discharge Delays None known at this time     Nina Ivy RN  Case Management  Ext: [phone]  05/21/2020  10:19 AM

## 2020-05-21 NOTE — PLAN OF CARE
Problem: Physical Therapy Goal  Goal: Physical Therapy Goal  Description  PT goals until 5/28/20    1. Pt supine to sit with minimal assist-not met  2. Pt sit to supine with minimal assist-not met  3. Pt sit to stand with hemiwalker with minimal assist-not met  4. Pt to perform gait 5ft with hemiwalker with max assist.-not met  5. Pt to transfer bed to/from bedside chair with hemiwalker with moderate assist.-not met  6. Pt to perform B LE exs in sitting or supine x 15 reps to strengthen B LE to improve functional mobility.-not met  7. Pt to sit on the EOB ~ 10 min with CGA to allow pt to perform functional activities-not met    Outcome: Ongoing, Progressing      Discharge Recommendations: Rehab    Pt requires max A of 2 to sit at the EOB.    Goals remain appropriate.     Beth Callahan, PTA.   [phone]   5/21/2020

## 2020-05-21 NOTE — PROGRESS NOTES
[facility] Medicine  Progress Note    Patient Name: Brett Vega  MRN: [id number]  Patient Class: IP- Inpatient   Admission Date: 5/14/2020  Length of Stay: 7 days  Attending Physician: Dmitri Looney MD  Primary Care Provider: Elyse Parham MD    Subjective:     Principal Problem:Nontraumatic intracerebral hemorrhage    HPI:  Mr. Vega is a 74 yo male with no significant past medical history transferred from [facility] after he presented there for new onset of dense right hemiparesis and marked dysarthria. Onset unclear but likely around 3am on 05/14/20. No recent illness. Legally blind. BP on arrival to outside facility 247/129. Cardene ggt initiated. 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 Telemedicine-stroke done by Neurology and patient transferred to [facility] on 05/15/20 and admitted under [facility] service. Upon arrival, pt awake, alert, oriented, following commands. No movement against gravity on the right side. On Cardene ggt.      Hospital Course: 05/15/2020: SBP < 160, add hydralazine, PT/OT, evd watch  5/16/2020: increase Hydralazine, add losartan, start tube feeds, follow up CTH in AM, add silodosin and place cordova catheter, replace electrolytes PRN  5/17/2020: CT head stable, advance TF, add labetalol 100 q8hr, d/c cardene gtt  5/18/2020: required one prn dose of labetalol at 3 AM, scheduled hydralizine changed to q6 hrs, trial of puree consistency today although I do not feel patients swallowing is at point where can eat unattended or large quantities of food  5/19/20 Losartan increased to 100, hydralazine increased to 75. Resuming TF. Modified barium swallow test ordered. Nebs q6hr PRN. Transferred to Stroke Floor.   05/20/20: Patient on NG tube feeds, tachypnea and complaint of mild intermittent cough and associated dyspnea.     Hospital Medicine team consulted on 05/20/20 for  respiratory distress.  Patient remains afebrile, heart rate wnl, no leukocyte count. tachypnea noted 22-28 rate with accessory muscle use. Chest congestion, gargling, coarse breath sounds and bibasilar wheezing on exam. CXR unremarkable for lobar consolidation, however reveals pulmonary congestive changes. no signs of volume over load on exam, Echo with no diastolic or systolic dysfunction. Primary team gave 20 IV lasix with UOP 2 L in 24 hours. COVID19 negative 05/20/20     Interval History (05/21/20): Patient seen and examined at the bedside. Continues to be verbally responsive with mild dysarthria and right sided upper and lower extremity weakness. Oral and tube feeds on hold. Tachyapnea, with accessary muscles use but notes improvement in breathing compared to yesterday. Requiring 2.0 L nasal canula to maintain sats 95%. Scheduled for modified barium swallow study today.     Review of Systems  Constitutional: Positive for activity change. Negative for appetite change, chills, fatigue, fever and unexpected weight change.   HENT: Negative for congestion, drooling, facial swelling and postnasal drip.    Eyes: Positive for visual disturbance. Negative for photophobia.   Respiratory: Positive for cough and shortness of breath. Negative for wheezing.    Cardiovascular: Negative for chest pain, palpitations and leg swelling.   Gastrointestinal: Negative for abdominal distention, abdominal pain, anal bleeding, blood in stool, constipation, diarrhea and nausea.   Endocrine: Negative for polyphagia and polyuria.   Musculoskeletal: Negative for arthralgias, back pain, gait problem and joint swelling.   Skin: Negative for pallor and rash.   Neurological: Negative for dizziness, facial asymmetry, light-headedness and headaches.   Hematological: Negative for adenopathy. Does not bruise/bleed easily.     Objective:     Vital Signs (Most Recent):  Temp: 97.7 °F (36.5 °C) (05/21/20 0454)  Pulse: 92 (05/21/20 0751)  Resp: (!) 23  (05/21/20 0700)  BP: (!) 160/92 (05/21/20 0700)  SpO2: 95 % (05/21/20 0700) Vital Signs (24h Range):  Temp:  [97.7 °F (36.5 °C)-99.4 °F (37.4 °C)] 97.7 °F (36.5 °C)  Pulse:  [] 92  Resp:  [17-29] 23  SpO2:  [90 %-97 %] 95 %  BP: (140-167)/(83-97) 160/92     Weight: 95.3 kg (210 lb)  Body mass index is 30.13 kg/m².    Intake/Output Summary (Last 24 hours) at 5/21/2020 1001  Last data filed at 5/21/2020 0400  Gross per 24 hour   Intake 140 ml   Output 1965 ml   Net -1825 ml      Physical Exam  Constitutional:   Mild respiratory distress, using accessory muscle use for breathing   HENT:   Mouth/Throat: Oropharynx is clear and moist. No oropharyngeal exudate.   Eyes: Conjunctivae are normal. No scleral icterus.   Neck: Normal range of motion. Neck supple. No JVD present. No tracheal deviation present. No thyromegaly present.   Cardiovascular: Normal rate, regular rhythm, normal heart sounds and intact distal pulses. Exam reveals no gallop and no friction rub.   No murmur heard.  Pulmonary/Chest: No stridor. He has crackles bibasilar lung fields. He exhibits no tenderness.   Coarse breath sounds   Abdominal: Soft. Bowel sounds are normal. +  distension and no mass. There is no tenderness. There is no guarding.   Musculoskeletal: He exhibits no edema, tenderness or deformity.   Lymphadenopathy:     He has no cervical adenopathy.   Neurological:   Right sided weakness; upper and lower extremity, Dysarthria   Skin: Skin is warm. Capillary refill takes less than 2 seconds. No erythema. No pallor.     Significant Labs:   CBC:   Recent Labs   Lab 05/20/20  0342 05/21/20  0507   WBC 8.92 10.12   HGB 12.0* 12.6*   HCT 39.1* 41.7    289     CMP:   Recent Labs   Lab 05/20/20  0342 05/20/20  1549 05/21/20  0507    144 144   K 3.9 3.8 3.9   * 111* 110   CO2 24 27 25    115* 113*   BUN 33* 33* 32*   CREATININE 0.8 0.8 0.8   CALCIUM 8.7 9.3 9.0   PROT 6.4 6.6 6.8   ALBUMIN 3.1* 3.3* 3.3*   BILITOT 0.5  0.4 0.6   ALKPHOS 56 64 66   AST 21 24 24   ALT 19 21 22   ANIONGAP 9 6* 9   EGFRNONAA >60.0 >60.0 >60.0     Magnesium:   Recent Labs   Lab 05/20/20  0342 05/21/20  0507   MG 2.5 2.4     Significant Imaging: I have reviewed all pertinent imaging results/findings within the past 24 hours.    Assessment/Plan:      Respiratory Distress  75 years old male admitted for caudate and thalamic hemorraghic stroke initially in [facility] due to IV antihypertensives, stepped down to floor neurology team on 05/19/20.   -- tube feeds started on 05/19/20 at 10:32 am  -- patient remains afebrile, heart rate wnl, no leukocyte count  -- tachypnea noted 22-28 rate with accessory muscle use  -- Chest congestion, gargling, coarse breath sounds and bibasilar wheezing/crackles on exam  -- CXR unremarkable for lobar consolidation; has congestive changes  -- no signs of volume over load on exam, Echo with no diastolic or systolic dysfunction,   -- tested COVID19 negative on 05/20/20  -- had mild troponin elevation 0.4 - > 0.117 -> 0.113 likely type II demand ischemia in setting of hypertensive emergency  -- lasix naive, 20 mg IV given yesterday with UOP 2.0 L  Assessment: Respiratory distress likely due to aspiration vs atelectasis vs viral pneumonia vs pulmonary embolism  Plan:  -- pulmonary toilet with IS, chest PT, BID suction   -- Scheduled nebulization Levalbuterol/Ipatropium Q8H; PRN Q4H for wheezing and dsypnea  -- Hold tube feeds, and oral feeds for now. Aspiration precautions, HOB > 60 degrees, await barium study results  -- will obtain a lower extremity doppler ultrasound to exclude DVT  -- will obtain abd XR to exclude ileus, or constipation contributing to effect on diaphragm contributing to tachypnea (mildly distended abdomen on exam - last bowel movement reported yesterday)  -- will give another trial of lasix 20 mg IV push again and assess response   -- If no improvement, will consider treating for atypical bacterial PNA vs  obtain CTA Chest to evaluate further etiologies of respiratory distress tomorrow.     VTE Risk Mitigation (From admission, onward)         Ordered     enoxaparin injection 40 mg  Daily      05/18/20 1306     Reason for No Pharmacological VTE Prophylaxis  Once     Question:  Reasons:  Answer:  Risk of Bleeding    05/14/20 1101     IP VTE HIGH RISK PATIENT  Once      05/14/20 1101     Place sequential compression device  Until discontinued      05/14/20 1101              Thank you for your consult. I will follow-up with patient. Please contact us if you have any additional questions.      Patient discussed and seen with Dr. Ham. Further reccs per the attending addendum.      Otto Alanis MD   Internal Medicine PGY 3  Department of Hospital Medicine   [facility]                    05/21/2020                             STAFF PHYSICIAN NOTE                                   Attending Attestation for Rounds with Resident  I have reviewed and concur with the resident's history, physical, assessment, and plan.  I have personally interviewed and examined the patient at bedside and agree with the resident's findings.                                     Mitzi Ham MD  Senior Hospitalist  [id number], [phone]

## 2020-05-21 NOTE — PROCEDURES
Modified Barium Swallow    Patient Name:  Brett Vega   MRN:  [id number]      Recommendations:     Recommendations:                General Recommendations:  Dysphagia therapy and Speech/language therapy  Diet recommendations:  NPO, NPO   Aspiration Precautions: Alternate means of nutrition/hydration/medications, HOB to 90 degrees and Strict aspiration precautions   General Precautions: Standard, aspiration, fall, vision impaired, NPO  Communication strategies:  none    Referral     Reason for Referral  Patient was referred for a Modified Barium Swallow Study to assess the efficiency of his/her swallow function, rule out aspiration and make recommendations regarding safe dietary consistencies, effective compensatory strategies, and safe eating environment.     Diagnosis: Nontraumatic intracerebral hemorrhage       History:     History reviewed. No pertinent past medical history.    Objective:     Current Respiratory Status: 05/21/20    Alert: yes    Cooperative: yes    Follows Directions: yes however inconsistent initiation of secondary swallow despite cues    Visualization  · Patient was seen in the lateral view  · NG tube observed in place  · Supplement O2 in place via nasal cannula present though not in place    Oral Peripheral Examination  · Oral Musculature: right weakness  · Dentition: edentulous(dentures not present)  · Oral Labial Strength and Mobility: impaired retraction  · Lingual Strength and Mobility: functional strength, functional protrusion  · Voice Prior to PO Intake: clear, severe dysarthria    Consistencies Assessed  · Nectar thick - tsp and cup sip nectar barium   · Thin-Honey Thick - x2 tsp thin-honey barium  · Puree - x2 tsp barium pudding    Oral Preparation/Oral Phase  · Decreased base of tongue mobility  · Premature spillage   · No observed anterior loss    Pharyngeal Phase   Pt with reduced BOT retraction  Pt with reduced hyolaryngeal elevation and excursion patterns  Pt with incomplete  epiglottic inversion patterns    NECTAR-THICK LIQUID:  Swallow delay with trigger at the level of the pyriform sinuses.   Pt with flash penetration during swallow with first tsp presentation.  Pt with david silent aspiration during swallow with cup sip presentation.  Multiple swallow to clear moderate dispersed pharyngeal stasis.  Pt without sensory response/airway protective response during silent aspiration of cup sip.  Pt warranted cued throat clear or cough, though was unable to eject aspirated material    THIN-HONEY LIQUID:  Pt without penetration upon initial tsp presentation.  Pt with aspiration down posterior laryngeal wall during the swallow. Minimal cough response. Unable to eject aspirated material.  Trace dispersed stasis throughout pharynx. Mostly clears with multiple swallows.       PUREE:  No observed penetration/aspiration of puree presentations.   Trace stasis following presentations throughout pharynx. Stasis remained along posterior pharyngeal wall despite multiple swallows.       Cervical Esophageal Phase  · UES appeared to accommodate all bolus types without stasis or retrograde movement observed     Assessment:     Impressions  ·   Patient demonstrates mod-severe Oropharyngeal  dysphagia characterized by david silent aspiration of nectar-thickened liquids, aspiration of honey-thick liquid with tsp presentations.     Prognosis: Guarded    Barriers:  · Respiratory compromise    Plan  Pt to remain NPO at this time with alternate means of nutrition/hydration/medication. ST to initiate trials of puree and honey-thick liquids at bedside along with exercises to target strengthening of the swallow mechanism. Continue ST per POC.     Education  Results were discussed with patient. Results were discussed with Medical Team who was in agreement with plan.     Goals:   Multidisciplinary Problems     SLP Goals        Problem: SLP Goal    Goal Priority Disciplines Outcome   SLP Goal     SLP Ongoing,  Progressing   Description:  Speech Language Pathology Goals  Goals expected to be met by 5/28:    1. Pt will participate in ongoing swallow assessment  2. Pt will participate in ongoing trials of puree and honey thick liquids without overt s/s of aspiration or airway compromise.  3. Pt will utilize speech strategies with min cues  4. Pt will follow complex commands with 50% accy and min cues  5. Pt will complete problem solving task with 80% accy and min cues  6. Pt will participate in swallow exercises to facilitate strengthening of the swallowing mechanism in freq. of x5-10 reps.                           Plan:   · Patient to be seen:  Therapy Frequency: 5 x/week   · Plan of Care expires:     · Plan of Care reviewed with:  patient        Discharge recommendations:  rehabilitation facility   Barriers to Discharge:  Level of Skilled Assistance Needed      Time Tracking:   SLP Treatment Date:   05/21/20  Speech Start Time:  1058  Speech Stop Time:  1115     Speech Total Time (min):  17 min    Razia Camarillo CF-SLP  05/21/2020

## 2020-05-21 NOTE — ASSESSMENT & PLAN NOTE
Area of vasogenic cerebral edema identified when reviewing brain imaging in the L basal ganglia. There is mass effect associated with it. We will continue to monitor the patients clinical exam for any worsening of symptoms which may indicate expansion of the hemorrhage or the area of the edema resulting in the clinical change. The ICH is likely 2/2 uncontrolled HTN.

## 2020-05-21 NOTE — PT/OT/SLP PROGRESS
"Physical Therapy Treatment    Patient Name:  Brett Vega   MRN:  [id number]  Admitting Diagnosis: Nontraumatic intracerebral hemorrhage  Recent Surgery: * No surgery found *      Recommendations:     Discharge Recommendations:  rehabilitation facility   Discharge Equipment Recommendations: (TBD)   Barriers to discharge: Decreased caregiver support    Plan:     During this hospitalization, patient to be seen 4 x/week to address the above listed problems via gait training, therapeutic activities, therapeutic exercises, neuromuscular re-education  · Plan of Care Expires:  06/14/20   Plan of Care Reviewed with: patient    This Plan of care has been discussed with the patient who was involved in its development and understands and is in agreement with the identified goals and treatment plan    Subjective     Communicated with RN (Tomasa) prior to session.     Patient comments: Pt with c/o fatigue "I've had a long day"  Pain/Comfort:  · Pain Rating 1: 0/10  · Pain Rating Post-Intervention 1: 0/10    Objective:     Patient found with: bed alarm, cordova catheter, NG tube, oxygen, pulse ox (continuous), telemetry    Patient found sup in bed upon PT entry to room, agreeable to treatment.  No family present in the room.    General Precautions: Standard, aspiration, fall, NPO, vision impaired(legally blind)   Orthopedic Precautions:N/A   Braces: N/A       BED MOBILITY (vc's for hand placement sequencing of task):        Rolling to the R:  Min A.       Rolling to the L:  NT.        Sup > sit at the EOB:  Mod/max A for trunk elevation and LE's, from R side, HOB flat, no use of rail.       Sit > sup:  Mod/max A for trunk and LE's.       Scooting hips to EOB with mod A       Scooting hips along the EOB to the R requiring mod/max A x3 scoots                       SITTING AT THE EDGE OF THE BED (12 min)   Assistance Level Required: initially with mod then min A for trunk control and R UE with L UE support   Postural deviations " noted: flexed trunk, PPT, decreased attention to the R, R lateral/minimal push with L UE to the R   Encouraged: upright posture, APT, B feet in contact with the floor, midline orientation, R UE in weight bearing position, attention to the R        TRANSFERS        NP this session 2* pt with c/o fatigue    THERAPEUTIC ACTIVITIES/EXERCISES  Pt performs R LE AAROM ther ex's while sup in bed x15 reps with vc's    EDUCATION  Education provided to pt regarding: postural control, midline orientation, attention to the R.    They were provided and educated on proper positioning in supine and in sitting with support of affected R UE in order to increase awareness of it and to decrease the effects of immobility, specifically edema and pain.     Whiteboard updated with correct mobility information. RN/PCT notified.  Pt requires max A of 2 to sit at the EOB.    Patient left sup in bed with HOB elevated at 30* and R UE supported on pillow, with  all lines intact, call button in reach, bed alarm on and nurse notified    AM-PAC 6 CLICK MOBILITY  Turning over in bed (including adjusting bedclothes, sheets and blankets)?: 2  Sitting down on and standing up from a chair with arms (e.g., wheelchair, bedside commode, etc.): 2  Moving from lying on back to sitting on the side of the bed?: 2  Moving to and from a bed to a chair (including a wheelchair)?: 2  Need to walk in hospital room?: 1  Climbing 3-5 steps with a railing?: 1  Basic Mobility Total Score: 10     Assessment:     Brett Vega is a 75 y.o. male admitted with a medical diagnosis of Nontraumatic intracerebral hemorrhage.  He presents with the following impairments/functional limitations:  weakness, impaired endurance, impaired sensation, impaired self care skills, impaired functional mobilty, gait instability, impaired balance, visual deficits, decreased coordination, decreased upper extremity function, decreased lower extremity function, abnormal tone, decreased ROM,  impaired coordination, impaired fine motor, edema, impaired cardiopulmonary response to activity. R hemiparesis requiring significant assistance and verbal cues for bed mob, scooting to/along the EOB, sit < > stand, static/dynamic sitting 2* weakness, decreased trunk control, R UE flaccidity.   In light of pt's current functional level and deficits, it is anticipated that pt will need to participate in an intense rehab program consisting of PT, OT and ST in order to achieve full rehab potential to return to previous level of function and roles.  Pt remains motivated to participate in PT session and will cont to benefit from skilled PT intervention.      Rehab Prognosis:  Good; patient would benefit from acute skilled PT services to address these deficits and reach maximum level of function.      GOALS:   Multidisciplinary Problems     Physical Therapy Goals        Problem: Physical Therapy Goal    Goal Priority Disciplines Outcome Goal Variances Interventions   Physical Therapy Goal     PT, PT/OT Ongoing, Progressing     Description:  PT goals until 5/28/20    1. Pt supine to sit with minimal assist-not met  2. Pt sit to supine with minimal assist-not met  3. Pt sit to stand with hemiwalker with minimal assist-not met  4. Pt to perform gait 5ft with hemiwalker with max assist.-not met  5. Pt to transfer bed to/from bedside chair with hemiwalker with moderate assist.-not met  6. Pt to perform B LE exs in sitting or supine x 15 reps to strengthen B LE to improve functional mobility.-not met  7. Pt to sit on the EOB ~ 10 min with CGA to allow pt to perform functional activities-not met                     Time Tracking:     PT Received On: 05/21/20  PT Start Time: 1538     PT Stop Time: 1606  PT Total Time (min): 28 min     Billable Minutes: Therapeutic Activity 16 and Neuromuscular Re-education 12    Treatment Type: Treatment  PT/PTA: PTA     PTA Visit Number: 1       Beth Callahan PTA.  Pager  [phone]    5/21/2020    .

## 2020-05-21 NOTE — ASSESSMENT & PLAN NOTE
74 yo M with no known PMHx presents to [facility] [place]14/2020 transferred from OSH for large L thalamic and caudate ICH with intraventricular extension.  Of note, patient had SBP 240s documented at OSH.  Pt otherwise has no clear risk factors for ICH.  Does not appear to have cognitive deficits on exam, no reported concern for memory issues.  No medications, no EtOH use.  No recent trauma.  No unexplained weight loss or B symptoms.  Admitted to Neuro ICU w/ NSGY following.  Etiology likely hypertensive, ruled out vascular malformation w/ further imaging. Repeat CT Head in 5/17: stable without evidence for significant interval detrimental change.        Antithrombotics for secondary stroke prevention:  None--ICH  Statins for secondary stroke prevention/HLD: LDL elevated--would not start statin acutely in setting of ICH, but could discuss low dose statin for reduction of stroke risk factors in stroke clinic follow up  Aggressive risk factor modification: HTN  Rehab efforts: PT/OT/SLP/PM&R  Diagnostics ordered/pending: CTA head to eval for vascular malformation  VTE prophylaxis: None: Reason for No Pharmacological VTE Prophylaxis: History of systemic or intracranial bleeding  BP parameters: ICH: SBP <140

## 2020-05-21 NOTE — PT/OT/SLP PROGRESS
Occupational Therapy      Patient Name:  Brett Vega   MRN:  [id number]    Patient CRISTA for MBSS.   PTA to see patient in the PM.    OT to follow up 5/22 as appropriate.     DELLA Evans  5/21/2020

## 2020-05-21 NOTE — PROGRESS NOTES
[facility]  Vascular Neurology  Comprehensive Stroke Center  Progress Note    Assessment/Plan:     * Nontraumatic intracerebral hemorrhage  74 yo M with no known PMHx [place] to [facility] [place]14[place]2020 transferred from OSH for large L thalamic and caudate ICH with intraventricular extension.  Of note, patient had SBP 240s documented at OSH.  Pt otherwise has no clear risk factors for ICH.  Does not appear to have cognitive deficits on exam, no reported concern for memory issues.  No medications, no EtOH use.  No recent trauma.  No unexplained weight loss or B symptoms.  Admitted to Neuro ICU w/ NSGY following.  Etiology likely hypertensive, ruled out vascular malformation w/ further imaging. Repeat CT Head in 5/17: stable without evidence for significant interval detrimental change.        Antithrombotics for secondary stroke prevention:  None--ICH  Statins for secondary stroke prevention/HLD: LDL elevated--would not start statin acutely in setting of ICH, but could discuss low dose statin for reduction of stroke risk factors in stroke clinic follow up  Aggressive risk factor modification: HTN  Rehab efforts: PT/OT/SLP/PM&R  Diagnostics ordered/pending: CTA head to eval for vascular malformation  VTE prophylaxis: None: Reason for No Pharmacological VTE Prophylaxis: History of systemic or intracranial bleeding  BP parameters: ICH: SBP <140    Dysphagia  NG tube in place  Patient failed MBSS, now NPO per SLP  Will discuss PEG tube placement    Respiratory distress  Patient with increased work of breathing, tachypnea, and hypoxia.  Chest x-ray with pulmonary edema.  Patient afebrile with no leukocytosis  -IVF discontinued.   -Lasix 20 mg given with good output, but no sustained improvement noted  -BNP - 310  -Troponin mildly elevated but trending down - most likely ischemic demand  -EKG with no acute changes.  -COVID test negative  -Consult to IM - appreciate recommendations   -- pulmonary toilet with IS,  chest PT, BID suction    -- Scheduled nebulization Levalbuterol/Ipatropium Q8H; PRN Q4H for wheezing and dsypnea   -- Hold tube feeds, and oral feeds for now. Aspiration precautions, HOB > 60 degrees, await barium study results   -- will obtain a lower extremity doppler ultrasound to exclude DVT   -- will obtain abd XR to exclude ileus, or constipation contributing to effect on diaphragm contributing to  tachypnea (mildly distended abdomen on exam - last bowel movement reported yesterday)   -- will give another trial of lasix 20 mg IV push again and assess response    -- If no improvement, will consider treating for atypical bacterial PNA vs obtain CTA Chest to evaluate further etiologies of  respiratory distress tomorrow.          Dysarthria  -2/2 ICH, continue SLP eval and treat    Hemiparesis, right  -2/2 ICH, continue PT eval and treat  -recommending discharge to inpatient rehab    Vasogenic cerebral edema  Area of vasogenic cerebral edema identified when reviewing brain imaging in the L basal ganglia. There is mass effect associated with it. We will continue to monitor the patients clinical exam for any worsening of symptoms which may indicate expansion of the hemorrhage or the area of the edema resulting in the clinical change. The ICH is likely 2/2 uncontrolled HTN.      Essential hypertension  -Stroke risk factor, likely etiology of ICH in this pt  - Med regiment adjusted:   Coreg 12.5mg BID   Norvasc 10mg Daily   HCTZ 12.5mg Daily   Continuing hydralazine until BP stabilized and will change to PRN only             05/14/2020:  Transferred to [facility] from [facility] for L ICH/IVH from caudate and thalamus  05/15/2020:   Continues on cardene for SBP < 140, neurosurgery following.  05/16/2020:   Started back on cardene for SBP < 140 this AM  05/18/2020:  Off Cardene drip, placed on Losartan and Hydralazine. Lovenox.  05/19/2010:  /180. Still with Rt side weakness.   5/20: Stepped down overnight.  Increased work of breathing, tachypnic.  IVF discontinued.  Crackles in bases and expiratory wheezes.  CXR with pulmonary edema.  Lasix given with good output but no sustained improvement of breathing.  Currently COVID rule out.  IM following. Blood pressure elevated - changed to carvedilol 12.5mg BID, Norvasc 10mg daily, with plans to start HCTZ tomorrow.  Continuing Hydralazine until BP stable and can change to PRN only. TF on hold due to respiratory status.  Scheduled for MBSS tomorrow if able to tolerate.  5/21 COVID test negative, tachypnea persisting, hospital medicine assisting, abdominal XR and US LE pending, additional IV lasix dose ordered, patient failed MBSS, will discuss PEG placement    STROKE DOCUMENTATION   Acute Stroke Times   Last Known Normal Time: 0300  Symptom Onset Date: 05/14/20  Symptom Onset Time: 0700  Stroke Team Called Time: 0806  Stroke Team Arrival Time: 0809  CT Interpretation Time: 0809  Decision to Treat Time for Alteplase: (Contraindicated 2/2 ICH)  Decision to Treat Time for IR: (no LVO)    NIH Scale:  1a. Level of Consciousness: 0-->Alert, keenly responsive  1b. LOC Questions: 0-->Answers both questions correctly  1c. LOC Commands: 0-->Performs both tasks correctly  2. Best Gaze: 0-->Normal  3. Visual: 0-->No visual loss  4. Facial Palsy: 0-->Normal symmetrical movements  5a. Motor Arm, Left: 0-->No drift, limb holds 90 (or 45) degrees for full 10 secs  5b. Motor Arm, Right: 4-->No movement  6a. Motor Leg, Left: 0-->No drift, leg holds 30 degree position for full 5 secs  6b. Motor Leg, Right: 3-->No effort against gravity, leg falls to bed immediately  7. Limb Ataxia: 0-->Absent  8. Sensory: 0-->Normal, no sensory loss  9. Best Language: 0-->No aphasia, normal  10. Dysarthria: 1-->Mild-to-moderate dysarthria, patient slurs at least some words and, at worst, can be understood with some difficulty  11. Extinction and Inattention (formerly Neglect): 0-->No abnormality  Total (NIH  Stroke Scale): 8       Modified Kina Score: 0  [place] Coma Scale:    ABCD2 Score:    [id number] Score:   HAS -BLED Score:   ICH Score:1  Hunt & Mccray Classification:      Hemorrhagic change of an Ischemic Stroke: Does this patient have an ischemic stroke with hemorrhagic changes? No     Neurologic Chief Complaint: Nontraumatic intracerebral hemorrhage    Subjective:     Interval History: Patient is seen for follow-up neurological assessment and treatment recommendations: tachypnea persisting but patient sating well on 3L NC and denies SOB. Failed MBSS, plans to discuss peg tube placement    HPI, Past Medical, Family, and Social History remains the same as documented in the initial encounter.     Review of Systems   Constitutional: Negative for chills and fever.   HENT: Positive for trouble swallowing.    Eyes: Negative for visual disturbance.   Respiratory: Positive for cough. Negative for chest tightness and shortness of breath.    Cardiovascular: Negative for chest pain.   Gastrointestinal: Negative for abdominal pain, constipation and vomiting.   Skin: Negative.    Neurological: Positive for facial asymmetry, speech difficulty and weakness.   Hematological: Negative.    Psychiatric/Behavioral: Negative.      Scheduled Meds:   amLODIPine  10 mg Oral Daily    carvediloL  12.5 mg Oral BID    enoxaparin  40 mg Subcutaneous Daily    hydrALAZINE  75 mg Oral Q6H    hydroCHLOROthiazide  12.5 mg Oral Daily    ipratropium  0.5 mg Nebulization Q8H    levalbuterol  0.63 mg Nebulization Q8H    losartan  100 mg Oral Daily    polyethylene glycol  17 g Oral Daily    senna-docusate 8.6-50 mg  1 tablet Oral BID    silodosin  4 mg Oral Daily     Continuous Infusions:    PRN Meds:acetaminophen, albuterol-ipratropium, hydrALAZINE, labetaloL, sodium chloride 0.9%    Objective:     Vital Signs (Most Recent):  Temp: 98 °F (36.7 °C) (05/21/20 1228)  Pulse: 77 (05/21/20 1228)  Resp: 20 (05/21/20 1228)  BP: (!) 172/90  (05/21/20 1228)  SpO2: (!) 94 % (05/21/20 1228)  BP Location: Left arm    Vital Signs Range (Last 24H):  Temp:  [97.7 °F (36.5 °C)-99.4 °F (37.4 °C)]   Pulse:  []   Resp:  [18-29]   BP: (153-172)/(89-97)   SpO2:  [90 %-97 %]   BP Location: Left arm    Physical Exam   Constitutional: He is oriented to person, place, and time. He appears well-developed and well-nourished. No distress.   HENT:   Head: Normocephalic and atraumatic.   Cardiovascular: Normal rate.   Pulmonary/Chest: Tachypnea noted. No respiratory distress.   Neurological: He is alert and oriented to person, place, and time.   Skin: Skin is warm and dry.   Vitals reviewed.      Neurological Exam:   LOC: alert  Attention Span: Good   Language: No aphasia  Articulation: Dysarthria  Orientation: Not oriented to time  Visual Fields: Full  EOM (CN III, IV, VI): Full/intact  Facial Movement (CN VII): Symmetric facial expression    Motor: Arm left  Normal 5/5  Leg left  Paresis: 4/5  Arm right  Paresis: 1/5  Leg right Paresis: 1/5  Sensation: Intact to light touch, temperature and vibration  Tone: Spasticity  RUE and RLE     Laboratory:  CMP:   Recent Labs   Lab 05/21/20  0507   CALCIUM 9.0   ALBUMIN 3.3*   PROT 6.8      K 3.9   CO2 25      BUN 32*   CREATININE 0.8   ALKPHOS 66   ALT 22   AST 24   BILITOT 0.6     CBC:   Recent Labs   Lab 05/21/20  0507   WBC 10.12   RBC 4.43*   HGB 12.6*   HCT 41.7      MCV 94   MCH 28.4   MCHC 30.2*     Lipid Panel:   No results for input(s): CHOL, LDLCALC, HDL, TRIG in the last 168 hours.  Hgb A1C:   No results for input(s): HGBA1C in the last 168 hours.    Diagnostic Results     Brain Imaging   CT head (05/17/20)   - Left basal ganglia parenchymal hemorrhage with intraventricular extension again noted, the overall appearance is stable without evidence for significant interval detrimental change.     CT Head (05/14/20 7205)  Redemonstration of an acute parenchymal hemorrhage centered within the left  basal ganglia with intraventricular extension.  Persistent ventricular prominence, stable in size when compared to prior study, in keeping with evolving hydrocephalus.    No evidence of new hemorrhage    CT Head (05/14/20 1501)  Evolving acute parenchymal hemorrhage centered left basal ganglia with intraventricular extension.    Continued intraventricular hemorrhage with mild distention lateral and 3rd ventricles concerning for component of hydrocephalus.    No significant increased volume of hemorrhage.      CT head (05/14/20 0813):  Evidence of intraparenchymal hemorrhage centered in the region of the posterior limb of the left internal capsule with intraventricular extension as above.      Cardiac Imaging   TTE (05/15/20):  · Technically difficult portable study  · Normal left ventricular systolic function. The estimated ejection fraction is 55%.  · Normal right ventricular systolic function.  · Indeterminate left ventricular diastolic function.  · Normal central venous pressure (3 mmHg).           Hanna Calvo PA-C  Comprehensive Stroke Center  Department of Vascular Neurology   [facility]

## 2020-05-21 NOTE — PROGRESS NOTES
Brett Vega   1945  [id number]      Attempted to evaluate patient as part of the [facility] for comprehensive skin care but patient was out of the room and not available for evaluation. We will attempt to re evaluate patient at a later date.     Please reach out to us with any questions or concerns regarding pressure injury or skin integrity issues.

## 2020-05-21 NOTE — ASSESSMENT & PLAN NOTE
Patient with increased work of breathing, tachypnea, and hypoxia.  Chest x-ray with pulmonary edema.  Patient afebrile with no leukocytosis  -IVF discontinued.   -Lasix 20 mg given with good output, but no sustained improvement noted  -BNP - 310  -Troponin mildly elevated but trending down - most likely ischemic demand  -EKG with no acute changes.  -COVID test negative  -Consult to IM - appreciate recommendations   -- pulmonary toilet with IS, chest PT, BID suction    -- Scheduled nebulization Levalbuterol/Ipatropium Q8H; PRN Q4H for wheezing and dsypnea   -- Hold tube feeds, and oral feeds for now. Aspiration precautions, HOB > 60 degrees, await barium study results   -- will obtain a lower extremity doppler ultrasound to exclude DVT   -- will obtain abd XR to exclude ileus, or constipation contributing to effect on diaphragm contributing to  tachypnea (mildly distended abdomen on exam - last bowel movement reported yesterday)   -- will give another trial of lasix 20 mg IV push again and assess response    -- If no improvement, will consider treating for atypical bacterial PNA vs obtain CTA Chest to evaluate further etiologies of  respiratory distress tomorrow.

## 2020-05-21 NOTE — CONSULTS
Radiology Consult    Brett Vega is a 75 y.o. male with a history of .  History reviewed. No pertinent past medical history.  History reviewed. No pertinent surgical history.    Discussed with primary team, SUNSHINE Alvarez.    Case reviewed with Radiology staff, Dr. Castro.     Procedure: percutaneous gastrostomy tube placement    Scheduled Meds:    amLODIPine  10 mg Oral Daily    carvediloL  12.5 mg Oral BID    enoxaparin  40 mg Subcutaneous Daily    hydrALAZINE  75 mg Oral Q6H    hydroCHLOROthiazide  12.5 mg Oral Daily    ipratropium  0.5 mg Nebulization Q8H    levalbuterol  0.63 mg Nebulization Q8H    losartan  100 mg Oral Daily    polyethylene glycol  17 g Oral Daily    senna-docusate 8.6-50 mg  1 tablet Oral BID    silodosin  4 mg Oral Daily     Continuous Infusions:   PRN Meds:acetaminophen, albuterol-ipratropium, hydrALAZINE, labetaloL, sodium chloride 0.9%    Allergies: Review of patient's allergies indicates:  No Known Allergies    Labs:  Recent Labs   Lab 05/15/20  0153   INR 1.1       Recent Labs   Lab 05/21/20  0507   WBC 10.12   HGB 12.6*   HCT 41.7   MCV 94         Recent Labs   Lab 05/21/20  0507   *      K 3.9      CO2 25   BUN 32*   CREATININE 0.8   CALCIUM 9.0   MG 2.4   ALT 22   AST 24   ALBUMIN 3.3*   BILITOT 0.6         Vitals (Most Recent):  Temp: 98 °F (36.7 °C) (05/21/20 1228)  Pulse: 82 (05/21/20 1512)  Resp: 20 (05/21/20 1228)  BP: (!) 172/90 (05/21/20 1228)  SpO2: (!) 94 % (05/21/20 1228)    Plan:   1. NPO after midnight.  2. Hold anticoagulants.  3. CT abd/pel pending.  4. NG tube in place, barium ordered for midnight.  5. Plan for percutaneous gastrostomy tube placement tomorrow, 5/22, pending CT results.    Dmitri Rodriguez MD  Radiology PGY-4  [phone]

## 2020-05-21 NOTE — NURSING
1500 Pt in bed with HOB elevated to facilitate breathing.  Pt continue on Oxygen @ 3 LPM via NC.  Pt denies needs at this time.  Pt continue with rapid breathing.  Pt state feel better today, but tired.  Pt off floor earlier in shift to radiology for swallow study.  Pt request time to rest.  Will continue to monitor patient.  Call light within reach.  Bed in lowest position.

## 2020-05-22 PROBLEM — I71.40 AAA (ABDOMINAL AORTIC ANEURYSM) WITHOUT RUPTURE: Status: ACTIVE | Noted: 2020-05-22

## 2020-05-22 LAB
ALBUMIN SERPL BCP-MCNC: 3.5 G/DL (ref 3.5–5.2)
ALP SERPL-CCNC: 78 U/L (ref 55–135)
ALT SERPL W/O P-5'-P-CCNC: 32 U/L (ref 10–44)
ANION GAP SERPL CALC-SCNC: 11 MMOL/L (ref 8–16)
AST SERPL-CCNC: 30 U/L (ref 10–40)
BASOPHILS # BLD AUTO: 0.03 K/UL (ref 0–0.2)
BASOPHILS NFR BLD: 0.2 % (ref 0–1.9)
BILIRUB SERPL-MCNC: 0.5 MG/DL (ref 0.1–1)
BUN SERPL-MCNC: 34 MG/DL (ref 8–23)
CALCIUM SERPL-MCNC: 9.1 MG/DL (ref 8.7–10.5)
CHLORIDE SERPL-SCNC: 108 MMOL/L (ref 95–110)
CO2 SERPL-SCNC: 26 MMOL/L (ref 23–29)
CREAT SERPL-MCNC: 0.8 MG/DL (ref 0.5–1.4)
DIFFERENTIAL METHOD: ABNORMAL
EOSINOPHIL # BLD AUTO: 0 K/UL (ref 0–0.5)
EOSINOPHIL NFR BLD: 0 % (ref 0–8)
ERYTHROCYTE [DISTWIDTH] IN BLOOD BY AUTOMATED COUNT: 13.6 % (ref 11.5–14.5)
EST. GFR  (AFRICAN AMERICAN): >60 ML/MIN/1.73 M^2
EST. GFR  (NON AFRICAN AMERICAN): >60 ML/MIN/1.73 M^2
GLUCOSE SERPL-MCNC: 108 MG/DL (ref 70–110)
HCT VFR BLD AUTO: 46.6 % (ref 40–54)
HGB BLD-MCNC: 14.1 G/DL (ref 14–18)
IMM GRANULOCYTES # BLD AUTO: 0.06 K/UL (ref 0–0.04)
IMM GRANULOCYTES NFR BLD AUTO: 0.4 % (ref 0–0.5)
LYMPHOCYTES # BLD AUTO: 0.8 K/UL (ref 1–4.8)
LYMPHOCYTES NFR BLD: 4.9 % (ref 18–48)
MAGNESIUM SERPL-MCNC: 2.5 MG/DL (ref 1.6–2.6)
MCH RBC QN AUTO: 28.8 PG (ref 27–31)
MCHC RBC AUTO-ENTMCNC: 30.3 G/DL (ref 32–36)
MCV RBC AUTO: 95 FL (ref 82–98)
MONOCYTES # BLD AUTO: 1.4 K/UL (ref 0.3–1)
MONOCYTES NFR BLD: 8.9 % (ref 4–15)
NEUTROPHILS # BLD AUTO: 13 K/UL (ref 1.8–7.7)
NEUTROPHILS NFR BLD: 85.6 % (ref 38–73)
NRBC BLD-RTO: 0 /100 WBC
PHOSPHATE SERPL-MCNC: 3.6 MG/DL (ref 2.7–4.5)
PLATELET # BLD AUTO: 282 K/UL (ref 150–350)
PMV BLD AUTO: 9.7 FL (ref 9.2–12.9)
POTASSIUM SERPL-SCNC: 3.7 MMOL/L (ref 3.5–5.1)
PROT SERPL-MCNC: 7.1 G/DL (ref 6–8.4)
RBC # BLD AUTO: 4.89 M/UL (ref 4.6–6.2)
SODIUM SERPL-SCNC: 145 MMOL/L (ref 136–145)
WBC # BLD AUTO: 15.21 K/UL (ref 3.9–12.7)

## 2020-05-22 PROCEDURE — 27000221 HC OXYGEN, UP TO 24 HOURS

## 2020-05-22 PROCEDURE — 94668 MNPJ CHEST WALL SBSQ: CPT

## 2020-05-22 PROCEDURE — 97530 THERAPEUTIC ACTIVITIES: CPT

## 2020-05-22 PROCEDURE — 99233 SBSQ HOSP IP/OBS HIGH 50: CPT | Mod: ,,, | Performed by: HOSPITALIST

## 2020-05-22 PROCEDURE — 99233 SBSQ HOSP IP/OBS HIGH 50: CPT | Mod: ,,, | Performed by: PSYCHIATRY & NEUROLOGY

## 2020-05-22 PROCEDURE — 83735 ASSAY OF MAGNESIUM: CPT

## 2020-05-22 PROCEDURE — 25000003 PHARM REV CODE 250: Performed by: PHYSICIAN ASSISTANT

## 2020-05-22 PROCEDURE — 11000001 HC ACUTE MED/SURG PRIVATE ROOM

## 2020-05-22 PROCEDURE — 94640 AIRWAY INHALATION TREATMENT: CPT

## 2020-05-22 PROCEDURE — 99223 PR INITIAL HOSPITAL CARE,LEVL III: ICD-10-PCS | Mod: GC,,, | Performed by: SURGERY

## 2020-05-22 PROCEDURE — 36415 COLL VENOUS BLD VENIPUNCTURE: CPT

## 2020-05-22 PROCEDURE — 63600175 PHARM REV CODE 636 W HCPCS: Performed by: NURSE PRACTITIONER

## 2020-05-22 PROCEDURE — 25000242 PHARM REV CODE 250 ALT 637 W/ HCPCS: Performed by: STUDENT IN AN ORGANIZED HEALTH CARE EDUCATION/TRAINING PROGRAM

## 2020-05-22 PROCEDURE — 97535 SELF CARE MNGMENT TRAINING: CPT

## 2020-05-22 PROCEDURE — 25500020 PHARM REV CODE 255: Performed by: PSYCHIATRY & NEUROLOGY

## 2020-05-22 PROCEDURE — 99223 1ST HOSP IP/OBS HIGH 75: CPT | Mod: GC,,, | Performed by: SURGERY

## 2020-05-22 PROCEDURE — 63600175 PHARM REV CODE 636 W HCPCS: Performed by: STUDENT IN AN ORGANIZED HEALTH CARE EDUCATION/TRAINING PROGRAM

## 2020-05-22 PROCEDURE — 99233 PR SUBSEQUENT HOSPITAL CARE,LEVL III: ICD-10-PCS | Mod: ,,, | Performed by: PSYCHIATRY & NEUROLOGY

## 2020-05-22 PROCEDURE — 85025 COMPLETE CBC W/AUTO DIFF WBC: CPT

## 2020-05-22 PROCEDURE — 63600175 PHARM REV CODE 636 W HCPCS: Performed by: PSYCHIATRY & NEUROLOGY

## 2020-05-22 PROCEDURE — 84100 ASSAY OF PHOSPHORUS: CPT

## 2020-05-22 PROCEDURE — 25000003 PHARM REV CODE 250: Performed by: PSYCHIATRY & NEUROLOGY

## 2020-05-22 PROCEDURE — 63600175 PHARM REV CODE 636 W HCPCS: Mod: JG | Performed by: PSYCHIATRY & NEUROLOGY

## 2020-05-22 PROCEDURE — 25500020 PHARM REV CODE 255: Performed by: STUDENT IN AN ORGANIZED HEALTH CARE EDUCATION/TRAINING PROGRAM

## 2020-05-22 PROCEDURE — A9698 NON-RAD CONTRAST MATERIALNOC: HCPCS | Performed by: STUDENT IN AN ORGANIZED HEALTH CARE EDUCATION/TRAINING PROGRAM

## 2020-05-22 PROCEDURE — 97110 THERAPEUTIC EXERCISES: CPT

## 2020-05-22 PROCEDURE — 99900035 HC TECH TIME PER 15 MIN (STAT)

## 2020-05-22 PROCEDURE — 25000003 PHARM REV CODE 250: Performed by: NURSE PRACTITIONER

## 2020-05-22 PROCEDURE — 99233 PR SUBSEQUENT HOSPITAL CARE,LEVL III: ICD-10-PCS | Mod: ,,, | Performed by: HOSPITALIST

## 2020-05-22 PROCEDURE — 94761 N-INVAS EAR/PLS OXIMETRY MLT: CPT

## 2020-05-22 PROCEDURE — 94799 UNLISTED PULMONARY SVC/PX: CPT

## 2020-05-22 PROCEDURE — 80053 COMPREHEN METABOLIC PANEL: CPT

## 2020-05-22 PROCEDURE — 63600175 PHARM REV CODE 636 W HCPCS: Performed by: RADIOLOGY

## 2020-05-22 RX ORDER — HYDROCHLOROTHIAZIDE 25 MG/1
25 TABLET ORAL DAILY
Status: DISCONTINUED | OUTPATIENT
Start: 2020-05-23 | End: 2020-05-26 | Stop reason: HOSPADM

## 2020-05-22 RX ORDER — FUROSEMIDE 10 MG/ML
20 INJECTION INTRAMUSCULAR; INTRAVENOUS ONCE
Status: COMPLETED | OUTPATIENT
Start: 2020-05-22 | End: 2020-05-22

## 2020-05-22 RX ORDER — CEFAZOLIN SODIUM 1 G/50ML
SOLUTION INTRAVENOUS
Status: COMPLETED | OUTPATIENT
Start: 2020-05-22 | End: 2020-05-22

## 2020-05-22 RX ORDER — GLUCAGON 1 MG
1 KIT INJECTION ONCE
Status: DISCONTINUED | OUTPATIENT
Start: 2020-05-22 | End: 2020-05-22 | Stop reason: SDUPTHER

## 2020-05-22 RX ORDER — MIDAZOLAM HYDROCHLORIDE 1 MG/ML
INJECTION INTRAMUSCULAR; INTRAVENOUS CODE/TRAUMA/SEDATION MEDICATION
Status: COMPLETED | OUTPATIENT
Start: 2020-05-22 | End: 2020-05-22

## 2020-05-22 RX ORDER — FENTANYL CITRATE 50 UG/ML
INJECTION, SOLUTION INTRAMUSCULAR; INTRAVENOUS CODE/TRAUMA/SEDATION MEDICATION
Status: COMPLETED | OUTPATIENT
Start: 2020-05-22 | End: 2020-05-22

## 2020-05-22 RX ORDER — GLUCAGON 1 MG
1 KIT INJECTION ONCE
Status: COMPLETED | OUTPATIENT
Start: 2020-05-22 | End: 2020-05-22

## 2020-05-22 RX ADMIN — LEVALBUTEROL HYDROCHLORIDE 0.63 MG: 0.63 SOLUTION RESPIRATORY (INHALATION) at 03:05

## 2020-05-22 RX ADMIN — HYDRALAZINE HYDROCHLORIDE 75 MG: 50 TABLET, FILM COATED ORAL at 10:05

## 2020-05-22 RX ADMIN — ENOXAPARIN SODIUM 40 MG: 100 INJECTION SUBCUTANEOUS at 05:05

## 2020-05-22 RX ADMIN — HYDRALAZINE HYDROCHLORIDE 75 MG: 50 TABLET, FILM COATED ORAL at 01:05

## 2020-05-22 RX ADMIN — STANDARDIZED SENNA CONCENTRATE AND DOCUSATE SODIUM 1 TABLET: 8.6; 5 TABLET ORAL at 10:05

## 2020-05-22 RX ADMIN — CARVEDILOL 12.5 MG: 12.5 TABLET, FILM COATED ORAL at 10:05

## 2020-05-22 RX ADMIN — IPRATROPIUM BROMIDE 0.5 MG: 0.5 SOLUTION RESPIRATORY (INHALATION) at 01:05

## 2020-05-22 RX ADMIN — LOSARTAN POTASSIUM 100 MG: 50 TABLET ORAL at 10:05

## 2020-05-22 RX ADMIN — HYDRALAZINE HYDROCHLORIDE 10 MG: 20 INJECTION INTRAMUSCULAR; INTRAVENOUS at 05:05

## 2020-05-22 RX ADMIN — HYDRALAZINE HYDROCHLORIDE 75 MG: 50 TABLET ORAL at 12:05

## 2020-05-22 RX ADMIN — IOHEXOL 100 ML: 350 INJECTION, SOLUTION INTRAVENOUS at 11:05

## 2020-05-22 RX ADMIN — FUROSEMIDE 20 MG: 10 INJECTION, SOLUTION INTRAMUSCULAR; INTRAVENOUS at 02:05

## 2020-05-22 RX ADMIN — AMLODIPINE BESYLATE 10 MG: 10 TABLET ORAL at 10:05

## 2020-05-22 RX ADMIN — SILODOSIN 4 MG: 4 CAPSULE ORAL at 10:05

## 2020-05-22 RX ADMIN — GLUCAGON HYDROCHLORIDE 1 MG: KIT at 09:05

## 2020-05-22 RX ADMIN — CEFAZOLIN SODIUM 1 G: 1 SOLUTION INTRAVENOUS at 08:05

## 2020-05-22 RX ADMIN — MIDAZOLAM HYDROCHLORIDE 1 MG: 1 INJECTION, SOLUTION INTRAMUSCULAR; INTRAVENOUS at 08:05

## 2020-05-22 RX ADMIN — BARIUM SULFATE 450 ML: 20 SUSPENSION ORAL at 01:05

## 2020-05-22 RX ADMIN — IPRATROPIUM BROMIDE 0.5 MG: 0.5 SOLUTION RESPIRATORY (INHALATION) at 03:05

## 2020-05-22 RX ADMIN — FENTANYL CITRATE 50 MCG: 50 INJECTION, SOLUTION INTRAMUSCULAR; INTRAVENOUS at 08:05

## 2020-05-22 RX ADMIN — LEVALBUTEROL HYDROCHLORIDE 0.63 MG: 0.63 SOLUTION RESPIRATORY (INHALATION) at 01:05

## 2020-05-22 NOTE — ASSESSMENT & PLAN NOTE
Patient with increased work of breathing, tachypnea, and hypoxia.  Chest x-ray with pulmonary edema.  Patient afebrile with no leukocytosis  -IVF discontinued.   -Lasix 20 mg given with good output, but no sustained improvement noted  -BNP - 310  -Troponin mildly elevated but trending down - most likely ischemic demand  -EKG with no acute changes.  -COVID test negative  -Consult to IM - appreciate recommendations   -- pulmonary toilet with IS, chest PT, BID suction    -- Scheduled nebulization Levalbuterol/Ipatropium Q8H; PRN Q4H for wheezing and dsypnea   -- Hold tube feeds, and oral feeds for now. Aspiration precautions, HOB > 60 degrees, await barium study results   -- LE US negative for DVT   -- abd XR negative for ileus or constipation    -- repeating CXR, may need additional lasix dose   -- If no improvement, will consider treating for atypical bacterial PNA vs obtain CTA Chest to evaluate further etiologies of  respiratory distress tomorrow.

## 2020-05-22 NOTE — PLAN OF CARE
Pt arrived to [facility] bay 3 s/p g tube placement.  NAD noted.  Report received from Debbi.  ARABELLA to [facility].  [place] draining to gravity. Will continue to monitor.

## 2020-05-22 NOTE — PROGRESS NOTES
Procedure complete. Patient tolerated well. 18f gastrostomy tube placed mid abdomen. Sterile dressing applied to site. Gtube connected to cordova bag to gravity. Patient to recover in rocu accompanied per ir nursing staff. Report to be given at bedside. Report called to floor.

## 2020-05-22 NOTE — ASSESSMENT & PLAN NOTE
-Stroke risk factor, likely etiology of ICH in this pt  - Med regiment adjusted:   Coreg 12.5mg BID   Norvasc 10mg Daily   Increased HCTZ to 25 mg Daily   - weaning hydralazine, switched from hydralazine 75 mg QID to TID

## 2020-05-22 NOTE — ASSESSMENT & PLAN NOTE
74 yo M with no known PMHx [place] [facility] [place]14[place] transferred from OSH for large L thalamic and caudate ICH with intraventricular extension.  Of note, patient had SBP 240s documented at OSH.  Pt otherwise has no clear risk factors for ICH.  Does not appear to have cognitive deficits on exam, no reported concern for memory issues.  No medications, no EtOH use.  No recent trauma.  No unexplained weight loss or B symptoms.  Admitted to Neuro ICU w/ NSGY following.  Etiology likely hypertensive, ruled out vascular malformation w/ further imaging. Repeat CT Head in 5/17: stable without evidence for significant interval detrimental change.        Antithrombotics for secondary stroke prevention:  None--ICH  Statins for secondary stroke prevention/HLD: LDL elevated--would not start statin acutely in setting of ICH, but could discuss low dose statin for reduction of stroke risk factors in stroke clinic follow up  Aggressive risk factor modification: HTN  Rehab efforts: PT/OT/SLP/PM&R  Diagnostics ordered/pending: none  VTE prophylaxis: None: Reason for No Pharmacological VTE Prophylaxis: History of systemic or intracranial bleeding  BP parameters: ICH: SBP <140

## 2020-05-22 NOTE — ASSESSMENT & PLAN NOTE
Found incidentally on CT abdomen ordered for PEG placement  7x6.7 cm  Vascular surgery consulted and no acute intervention warranted at this time  Needs follow up outpatient

## 2020-05-22 NOTE — PT/OT/SLP PROGRESS
"Physical Therapy Treatment    Patient Name:  Brett Vega   MRN:  [id number]    Recommendations:     Discharge Recommendations:  rehabilitation facility   Discharge Equipment Recommendations: (TBD as pt progresses)   Barriers to discharge: Decreased caregiver support requires assist for mobility    Assessment:     Brett Vega is a 75 y.o. male admitted with a medical diagnosis of Nontraumatic intracerebral hemorrhage.  He presents with the following impairments/functional limitations:  weakness, impaired endurance, impaired functional mobilty, gait instability, impaired balance, decreased coordination, decreased upper extremity function, decreased lower extremity function, decreased safety awareness, impaired cardiopulmonary response to activity.Pt is unsafe with functional mobility at this time due to pt requires moderate assist for bed mobility, moderate assist for transfers, and total assist for gait due to R LE weakness. Pt is motivated to progress with functional mobility.  Rehab Prognosis: Fair; patient would benefit from acute skilled PT services to address these deficits and reach maximum level of function.    Recent Surgery: * No surgery found *      Plan:     During this hospitalization, patient to be seen 4 x/week to address the identified rehab impairments via gait training, therapeutic activities, therapeutic exercises, neuromuscular re-education and progress toward the following goals:    · Plan of Care Expires:  06/14/20    Subjective   "I am tired"    Pain/Comfort:  · Pain Rating 1: 0/10  · Pain Rating Post-Intervention 1: 0/10      Objective:     Communicated with nurse prior to session.  Patient found supine with Condom Catheter, bed alarm, PEG Tube, telemetry, peripheral IV, pulse ox (continuous) upon PT entry to room.     General Precautions: Standard, aspiration, fall, vision impaired, NPO(legally blind)   Orthopedic Precautions:N/A   Braces: N/A     Functional Mobility:  · Bed Mobility:  "    · Rolling Left:  moderate assistance  · Supine to Sit: moderate assistance  · Sit to Supine: maximal assistance  · Transfers:     · Sit to Stand:  moderate assistance with hand-held assist  · Gait: pt performed pre gait stepping activities x 2 trials: stepping forward/back with L LE  x 5-6 reps with manual facilitation for R hip/knee ext and manual cues for weight shifting.    AM-PAC 6 CLICK MOBILITY  Turning over in bed (including adjusting bedclothes, sheets and blankets)?: 2  Sitting down on and standing up from a chair with arms (e.g., wheelchair, bedside commode, etc.): 2  Moving from lying on back to sitting on the side of the bed?: 2  Moving to and from a bed to a chair (including a wheelchair)?: 2  Need to walk in hospital room?: 1  Climbing 3-5 steps with a railing?: 1  Basic Mobility Total Score: 10     Therapeutic Activities and Exercises:   pt sat on the EOB ~ 24 min between standing trials with CGA/minimal assist due to LOB to the R at times. Pt stood x 3 trials as above for ~ 25-30 sec each trial. Pt performed B LE exs in sitting x 10 reps: hip flex (with AAROM on the R LE), knee ext, and ankle pumps (with PROM on the R LE.     Patient left HOB elevated with all lines intact, call button in reach, bed alarm on and nurse notified..    GOALS:   Multidisciplinary Problems     Physical Therapy Goals        Problem: Physical Therapy Goal    Goal Priority Disciplines Outcome Goal Variances Interventions   Physical Therapy Goal     PT, PT/OT Ongoing, Progressing     Description:  PT goals until 5/28/20    1. Pt supine to sit with minimal assist-not met  2. Pt sit to supine with minimal assist-not met  3. Pt sit to stand with hemiwalker with minimal assist-not met  4. Pt to perform gait 5ft with hemiwalker with max assist.-not met  5. Pt to transfer bed to/from bedside chair with hemiwalker with moderate assist.-not met  6. Pt to perform B LE exs in sitting or supine x 15 reps to strengthen B LE to improve  functional mobility.-not met  7. Pt to sit on the EOB ~ 10 min with CGA to allow pt to perform functional activities-not met                     Time Tracking:     PT Received On: 05/22/20  PT Start Time: 1346     PT Stop Time: 1417  PT Total Time (min): 31 min     Billable Minutes: Therapeutic Activity 15 and Therapeutic Exercise 16    Treatment Type: Treatment  PT/PTA: PT     PTA Visit Number: 1     Marissa Guajardo, PT  05/22/2020

## 2020-05-22 NOTE — PT/OT/SLP PROGRESS
Speech Language Pathology      Brett Vega  MRN: [id number]    Patient not seen today secondary to Unavailable (pt off the floor for PEG procedure). ST will follow-up next treatment date per POC.    Razia [place] CF-SLP  5/22/2020

## 2020-05-22 NOTE — SUBJECTIVE & OBJECTIVE
[facility]     Objective:     Vital Signs (Most Recent):  Temp: 97.7 °F (36.5 °C) (05/22/20 0715)  Pulse: 80 (05/22/20 0909)  Resp: 16 (05/22/20 0909)  BP: (!) 155/87 (05/22/20 0909)  SpO2: (!) 94 % (05/22/20 0909) Vital Signs (24h Range):  Temp:  [97.6 °F (36.4 °C)-98.5 °F (36.9 °C)] 97.7 °F (36.5 °C)  Pulse:  [66-88] 80  Resp:  [16-25] 16  SpO2:  [93 %-96 %] 94 %  BP: (151-181)/() 155/87     Weight: 95.3 kg (210 lb)  Body mass index is 30.13 kg/m².    Intake/Output Summary (Last 24 hours) at 5/22/2020 0922  Last data filed at 5/22/2020 0708  Gross per 24 hour   Intake 60 ml   Output 2325 ml   Net -2265 ml      Physical Exam    Significant Labs:   CBC:   Recent Labs   Lab 05/21/20  0507   WBC 10.12   HGB 12.6*   HCT 41.7        CMP:   Recent Labs   Lab 05/20/20  1549 05/21/20  0507    144   K 3.8 3.9   * 110   CO2 27 25   * 113*   BUN 33* 32*   CREATININE 0.8 0.8   CALCIUM 9.3 9.0   PROT 6.6 6.8   ALBUMIN 3.3* 3.3*   BILITOT 0.4 0.6   ALKPHOS 64 66   AST 24 24   ALT 21 22   ANIONGAP 6* 9   EGFRNONAA >60.0 >60.0       Significant Imaging: I have reviewed all pertinent imaging results/findings within the past 24 hours.

## 2020-05-22 NOTE — PROGRESS NOTES
[facility]  Vascular Neurology  Comprehensive Stroke Center  Progress Note    Assessment/Plan:     * Nontraumatic intracerebral hemorrhage  76 yo M with no known PMHx presents to [facility] 05/14/2020 transferred from OSH for large L thalamic and caudate ICH with intraventricular extension.  Of note, patient had SBP 240s documented at OSH.  Pt otherwise has no clear risk factors for ICH.  Does not appear to have cognitive deficits on exam, no reported concern for memory issues.  No medications, no EtOH use.  No recent trauma.  No unexplained weight loss or B symptoms.  Admitted to Neuro ICU w/ NSGY following.  Etiology likely hypertensive, ruled out vascular malformation w/ further imaging. Repeat CT Head in 5/17: stable without evidence for significant interval detrimental change.        Antithrombotics for secondary stroke prevention:  None--ICH  Statins for secondary stroke prevention/HLD: LDL elevated--would not start statin acutely in setting of ICH, but could discuss low dose statin for reduction of stroke risk factors in stroke clinic follow up  Aggressive risk factor modification: HTN  Rehab efforts: PT/OT/SLP/PM&R  Diagnostics ordered/pending: none  VTE prophylaxis: None: Reason for No Pharmacological VTE Prophylaxis: History of systemic or intracranial bleeding  BP parameters: ICH: SBP <140    AAA (abdominal aortic aneurysm) without rupture  Found incidentally on CT abdomen ordered for PEG placement  7x6.7 cm  Vascular surgery consulted    Dysphagia  NG tube in place  Patient failed MBSS, now NPO per SLP  PEG tube placed 5/22    Respiratory distress  Patient with increased work of breathing, tachypnea, and hypoxia.  Chest x-ray with pulmonary edema.  Patient afebrile with no leukocytosis  -IVF discontinued.   -Lasix 20 mg given with good output, but no sustained improvement noted  -BNP - 310  -Troponin mildly elevated but trending down - most likely ischemic demand  -EKG with no acute  changes.  -COVID test negative  -Consult to IM - appreciate recommendations   -- pulmonary toilet with IS, chest PT, BID suction    -- Scheduled nebulization Levalbuterol/Ipatropium Q8H; PRN Q4H for wheezing and dsypnea   -- Hold tube feeds, and oral feeds for now. Aspiration precautions, HOB > 60 degrees, await barium study results   -- LE US negative for DVT   -- abd XR negative for ileus or constipation    -- repeating CXR, may need additional lasix dose   -- If no improvement, will consider treating for atypical bacterial PNA vs obtain CTA Chest to evaluate further etiologies of  respiratory distress tomorrow.          Dysarthria  -2/2 ICH, continue SLP eval and treat    Hemiparesis, right  -2/2 ICH, continue PT eval and treat  -recommending discharge to inpatient rehab    Vasogenic cerebral edema  Area of vasogenic cerebral edema identified when reviewing brain imaging in the L basal ganglia. There is mass effect associated with it. We will continue to monitor the patients clinical exam for any worsening of symptoms which may indicate expansion of the hemorrhage or the area of the edema resulting in the clinical change. The ICH is likely 2/2 uncontrolled HTN.      Essential hypertension  -Stroke risk factor, likely etiology of ICH in this pt  - Med regiment adjusted:   Coreg 12.5mg BID   Norvasc 10mg Daily   Increased HCTZ to 25 mg Daily   - weaning hydralazine, switched from hydralazine 75 mg QID to TID           05/14/2020:  Transferred to [facility] from [facility] for L ICH/IVH from caudate and thalamus  05/15/2020:   Continues on cardene for SBP < 140, neurosurgery following.  05/16/2020:   Started back on cardene for SBP < 140 this AM  05/18/2020:  Off Cardene drip, placed on Losartan and Hydralazine. Lovenox.  05/19/2010:  /180. Still with Rt side weakness.   5/20: Stepped down overnight. Increased work of breathing, tachypnic.  IVF discontinued.  Crackles in bases and expiratory wheezes.  CXR  with pulmonary edema.  Lasix given with good output but no sustained improvement of breathing.  Currently COVID rule out.  IM following. Blood pressure elevated - changed to carvedilol 12.5mg BID, Norvasc 10mg daily, with plans to start HCTZ tomorrow.  Continuing Hydralazine until BP stable and can change to PRN only. TF on hold due to respiratory status.  Scheduled for MBSS tomorrow if able to tolerate.  5/21 COVID test negative, tachypnea persisting, hospital medicine assisting, abdominal XR and US LE pending, additional IV lasix dose ordered, patient failed MBSS, will discuss PEG placement  5/22 patient had PEG placed this morning, weaning hydralazine and increasing HCTZ. Repeat CXR pending to f/u on pulmonary edema    STROKE DOCUMENTATION   Acute Stroke Times   Last Known Normal Time: 0300  Symptom Onset Date: 05/14/20  Symptom Onset Time: 0700  Stroke Team Called Time: 0806  Stroke Team Arrival Time: 0809  CT Interpretation Time: 0809  Decision to Treat Time for Alteplase: (Contraindicated 2/2 ICH)  Decision to Treat Time for IR: (no LVO)    NIH Scale:  1a. Level of Consciousness: 0-->Alert, keenly responsive  1b. LOC Questions: 0-->Answers both questions correctly  1c. LOC Commands: 0-->Performs both tasks correctly  2. Best Gaze: 0-->Normal  3. Visual: 0-->No visual loss  4. Facial Palsy: 0-->Normal symmetrical movements  5a. Motor Arm, Left: 0-->No drift, limb holds 90 (or 45) degrees for full 10 secs  5b. Motor Arm, Right: 3-->No effort against gravity, limb falls  6a. Motor Leg, Left: 0-->No drift, leg holds 30 degree position for full 5 secs  6b. Motor Leg, Right: 3-->No effort against gravity, leg falls to bed immediately  7. Limb Ataxia: 0-->Absent  8. Sensory: 0-->Normal, no sensory loss  9. Best Language: 0-->No aphasia, normal  10. Dysarthria: 1-->Mild-to-moderate dysarthria, patient slurs at least some words and, at worst, can be understood with some difficulty  11. Extinction and Inattention  (formerly Neglect): 0-->No abnormality  Total (NIH Stroke Scale): 7       Modified Harrison Score: 0  [place] Coma Scale:    ABCD2 Score:    [id number] Score:   HAS -BLED Score:   ICH Score:1  Hunt & Mccray Classification:      Hemorrhagic change of an Ischemic Stroke: Does this patient have an ischemic stroke with hemorrhagic changes? No     Neurologic Chief Complaint: Nontraumatic intracerebral hemorrhage    Subjective:     Interval History: Patient is seen for follow-up neurological assessment and treatment recommendations: patient doing well post peg placement, denies pain, denies shortness of breath, adjusting BP medications today and repeating CXR    HPI, Past Medical, Family, and Social History remains the same as documented in the initial encounter.     Review of Systems   Constitutional: Negative for chills and fever.   HENT: Positive for trouble swallowing.    Eyes: Negative for visual disturbance.   Respiratory: Negative for cough, chest tightness and shortness of breath.    Cardiovascular: Negative for chest pain.   Gastrointestinal: Negative for abdominal pain, constipation and vomiting.   Skin: Negative.    Neurological: Positive for facial asymmetry, speech difficulty and weakness.   Hematological: Negative.    Psychiatric/Behavioral: Negative.      Scheduled Meds:   amLODIPine  10 mg Oral Daily    carvediloL  12.5 mg Oral BID    enoxaparin  40 mg Subcutaneous Daily    hydrALAZINE  75 mg Oral Q8H    [START ON 5/23/2020] hydroCHLOROthiazide  25 mg Oral Daily    ipratropium  0.5 mg Nebulization Q8H    levalbuterol  0.63 mg Nebulization Q8H    losartan  100 mg Oral Daily    polyethylene glycol  17 g Oral Daily    senna-docusate 8.6-50 mg  1 tablet Oral BID    silodosin  4 mg Oral Daily     Continuous Infusions:    PRN Meds:acetaminophen, albuterol-ipratropium, hydrALAZINE, labetaloL, sodium chloride 0.9%    Objective:     Vital Signs (Most Recent):  Temp: 98.9 °F (37.2 °C) (05/22/20  1044)  Pulse: 77 (05/22/20 1015)  Resp: (!) 21 (05/22/20 1044)  BP: (!) 178/92 (05/22/20 1044)  SpO2: 97 % (05/22/20 1015)  BP Location: Left arm    Vital Signs Range (Last 24H):  Temp:  [97.6 °F (36.4 °C)-99 °F (37.2 °C)]   Pulse:  [66-88]   Resp:  [16-27]   BP: (144-181)/()   SpO2:  [93 %-98 %]   BP Location: Left arm    Physical Exam   Constitutional: He is oriented to person, place, and time. He appears well-developed and well-nourished. No distress.   HENT:   Head: Normocephalic and atraumatic.   Cardiovascular: Normal rate.   Pulmonary/Chest: Tachypnea noted. No respiratory distress.   Abdominal: He exhibits distension. There is no tenderness.   Neurological: He is alert and oriented to person, place, and time.   Skin: Skin is warm and dry.   Vitals reviewed.      Neurological Exam:   LOC: alert  Attention Span: Good   Language: No aphasia  Articulation: Dysarthria  Orientation: Not oriented to time  Visual Fields: Full  EOM (CN III, IV, VI): Full/intact  Facial Movement (CN VII): Symmetric facial expression    Motor: Arm left  Normal 5/5  Leg left  Paresis: 4/5  Arm right  Paresis: 1/5  Leg right Paresis: 1/5  Sensation: Intact to light touch, temperature and vibration  Tone: Spasticity  RUE and RLE     Laboratory:  CMP:   Recent Labs   Lab 05/22/20  1042   CALCIUM 9.1   ALBUMIN 3.5   PROT 7.1      K 3.7   CO2 26      BUN 34*   CREATININE 0.8   ALKPHOS 78   ALT 32   AST 30   BILITOT 0.5     CBC:   Recent Labs   Lab 05/22/20  1042   WBC 15.21*   RBC 4.89   HGB 14.1   HCT 46.6      MCV 95   MCH 28.8   MCHC 30.3*     Lipid Panel:   No results for input(s): CHOL, LDLCALC, HDL, TRIG in the last 168 hours.  Hgb A1C:   No results for input(s): HGBA1C in the last 168 hours.    Diagnostic Results     Brain Imaging   CT head (05/17/20)   - Left basal ganglia parenchymal hemorrhage with intraventricular extension again noted, the overall appearance is stable without evidence for significant  interval detrimental change.     CT Head (05/14/20 2147)  Redemonstration of an acute parenchymal hemorrhage centered within the left basal ganglia with intraventricular extension.  Persistent ventricular prominence, stable in size when compared to prior study, in keeping with evolving hydrocephalus.    No evidence of new hemorrhage    CT Head (05/14/20 1501)  Evolving acute parenchymal hemorrhage centered left basal ganglia with intraventricular extension.    Continued intraventricular hemorrhage with mild distention lateral and 3rd ventricles concerning for component of hydrocephalus.    No significant increased volume of hemorrhage.      CT head (05/14/20 0813):  Evidence of intraparenchymal hemorrhage centered in the region of the posterior limb of the left internal capsule with intraventricular extension as above.      Cardiac Imaging   TTE (05/15/20):  · Technically difficult portable study  · Normal left ventricular systolic function. The estimated ejection fraction is 55%.  · Normal right ventricular systolic function.  · Indeterminate left ventricular diastolic function.  · Normal central venous pressure (3 mmHg).           Hanna Calvo PA-C  [facility]  Department of Vascular Neurology   [facility]

## 2020-05-22 NOTE — PROCEDURES
Radiology Post-Procedure Note    Pre Op Diagnosis: Dysphagia  Post Op Diagnosis: Same    Procedure: Gastrostomy tube placement    Procedure performed by: Noe Castro MD    Written Informed Consent Obtained: Yes  Specimen Removed: NO  Estimated Blood Loss: Minimal    Findings:   Image-guided gastrostomy tube placed without complication.  Tube to gravity drainage.  If no signs of peritonitis tomorrow am, tube may be used for feeding.    Patient tolerated procedure well.    Noe Castro MD  Diagnostic and Interventional Radiologist  Department of Radiology  Pager: [phone]

## 2020-05-22 NOTE — PROGRESS NOTES
"[facility]  Adult Nutrition  Progress Note    SUMMARY       Recommendations     1. When feasible, initiate tube feed with Isosource 1.5 @10ml/hr and increase by 10ml/hr Q4hrs to goal rate 60ml/hr (provides 2160 kcal, 98g pro,1094ml free water). Additional 250ml water flush Q6hrs or per MD. Hold for residuals >500ml.  Goals: 1. Pt to receive nutrition by RD follow up.  Nutrition Goal Status: new  Communication of RD Recs: (POC)    Reason for Assessment    Reason For Assessment: RD follow-up  Diagnosis: hemorrhage  Relevant Medical History: None  Interdisciplinary Rounds: did not attend  General Information Comments: Pt advanced from TF to po diet 5/18 with poor intake since then. Pt failed MBSS 5/21 and is having PEG placed today. No new wt since 5/15. No weight history is available and RD unable to complete NFPE prior to today. Pt off unit today for PEG placement; will complete NFPE at follow up visit.  Nutrition Discharge Planning: Unable to determine at this time.     Nutrition Risk Screen    Nutrition Risk Screen: tube feeding or parenteral nutrition, dysphagia or difficulty swallowing    Nutrition/Diet History    Spiritual, Cultural Beliefs, Sikhism Practices, Values that Affect Care: no  Food Allergies: NKFA  Factors Affecting Nutritional Intake: NPO    Anthropometrics    Temp: 98.9 °F (37.2 °C)  Height: 5' 10" (177.8 cm)  Height (inches): 70 in  Weight Method: Bed Scale  Weight: 95.3 kg (210 lb)  Weight (lb): 210 lb  Ideal Body Weight (IBW), Male: 166 lb  % Ideal Body Weight, Male (lb): 126.51 %  BMI (Calculated): 30.1  BMI Grade: 30 - 34.9- obesity - grade I       Lab/Procedures/Meds    Pertinent Labs Reviewed: reviewed  Pertinent Labs Comments: BUN 32, Glu 113, A1c 5.4%  Pertinent Medications Reviewed: reviewed  Pertinent Medications Comments: Lasix, senna      Estimated/Assessed Needs    Weight Used For Calorie Calculations: 95.4 kg (210 lb 5.1 oz)  Energy Calorie Requirements (kcal): " 2131 kcal(PAL 1.25)  Energy Need Method: [place]-St Jeor(no AF)  Protein Requirements: 98-113g/day  Weight Used For Protein Calculations: 75.4 kg (166 lb 3.6 oz)(1.5-2.0 g/kg of IBW)     Estimated Fluid Requirement Method: RDA Method(or per MD)  RDA Method (mL): 2131         Nutrition Prescription Ordered    Current Diet Order: NPO  Nutrition Order Comments: honey thick liquids  Current Nutrition Support Formula Ordered: (none)  Current Nutrition Support Rate Ordered: 0 (ml)  Current Nutrition Support Frequency Ordered: mL/hr    Evaluation of Received Nutrient/Fluid Intake    IV Fluid (mL): 0  I/O: +992ml since admission  Energy Calories Required: not meeting needs  Protein Required: not meeting needs  Fluid Required: not meeting needs  Comments: LBM 5/19  Tolerance: tolerating  % Intake of Estimated Energy Needs: 0 - 25 %  % Meal Intake: NPO    Nutrition Risk    Level of Risk/Frequency of Follow-up: high     Assessment and Plan    Nutrition Problem  Inadequate energy intake    Related to (etiology):   Poor po intake    Signs and Symptoms (as evidenced by):   Pt with poor po intake x 5 days, having PEG placed today.    Interventions(treatment strategy):  Collaboration of care with providers.    Nutrition Diagnosis Status:   Continues       Monitor and Evaluation    Food and Nutrient Intake: energy intake, enteral nutrition intake  Food and Nutrient Adminstration: enteral and parenteral nutrition administration  Anthropometric Measurements: weight, weight change, body mass index  Biochemical Data, Medical Tests and Procedures: electrolyte and renal panel, gastrointestinal profile, glucose/endocrine profile, inflammatory profile, lipid profile  Nutrition-Focused Physical Findings: overall appearance     Malnutrition Assessment     Unable to complete NFPE as pt was off unit for PEG placement.    Nutrition Follow-Up    RD Follow-up?: Yes

## 2020-05-22 NOTE — PROGRESS NOTES
[facility] Medicine  Progress Note    Patient Name: Brett Vega  MRN: [id number]  Patient Class: IP- Inpatient   Admission Date: 5/14/2020  Length of Stay: 8 days  Attending Physician: Dmitri Looney MD  Primary Care Provider: Elyse Parham MD    Subjective:     Principal Problem:Nontraumatic intracerebral hemorrhage    HPI:  Mr. Vega is a 74 yo male with no significant past medical history transferred from [facility] after he presented there for new onset of dense right hemiparesis and marked dysarthria. Onset unclear but likely around 3am on 05/14/20. No recent illness. Legally blind. BP on arrival to outside facility 247/129. Cardene ggt initiated. 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 Telemedicine-stroke done by Neurology and patient transferred to [facility] on 05/15/20 and admitted under [facility] service. Upon arrival, pt awake, alert, oriented, following commands. No movement against gravity on the right side. On Cardene ggt.      Hospital Course: 05/15/2020: SBP < 160, add hydralazine, PT/OT, evd watch  5/16/2020: increase Hydralazine, add losartan, start tube feeds, follow up CTH in AM, add silodosin and place cordova catheter, replace electrolytes PRN  5/17/2020: CT head stable, advance TF, add labetalol 100 q8hr, d/c cardene gtt  5/18/2020: required one prn dose of labetalol at 3 AM, scheduled hydralizine changed to q6 hrs, trial of puree consistency today although I do not feel patients swallowing is at point where can eat unattended or large quantities of food  5/19/20 Losartan increased to 100, hydralazine increased to 75. Resuming TF. Modified barium swallow test ordered. Nebs q6hr PRN. Transferred to Stroke Floor.   05/20/20: Patient on NG tube feeds, tachypnea and complaint of mild intermittent cough and associated dyspnea.     Hospital Medicine team consulted on 05/20/20 for  respiratory distress.  Patient remains afebrile, heart rate wnl, no leukocyte count. tachypnea noted 22-28 rate with accessory muscle use. Chest congestion, gargling, coarse breath sounds and bibasilar wheezing on exam. CXR unremarkable for lobar consolidation, however reveals pulmonary congestive changes. no signs of volume over load on exam, Echo with no diastolic or systolic dysfunction. Primary team gave 20 IV lasix with UOP 2 L in 24 hours. COVID19 negative 05/20/20 05/21/20: Patient seen and examined at the bedside. Continues to be verbally responsive with mild dysarthria and right sided upper and lower extremity weakness. Oral and tube feeds on hold. Tachyapnea, with accessary muscles use but notes improvement in breathing compared to yesterday. Requiring 2.0 L nasal canula to maintain sats 95%. Scheduled for modified barium swallow study today.     Interval History (05/22/20): Patient seen and examined at the bedside. No apparent concerns. Patient failed the swallowing study and today is s/p PEG tube placement per IR, patient tolerated the procedure well. Compared to yesterday, respiratory status is better. Remains NPO. RR 20-25. US LE doppler is negative for DVT. CXR unremarkable for ileus. CT abd w/o contrast consistent with infrarenal AAA 7 x 6.7 cm. Receiving scheduled neutralization. Lasix 20 mg given yesterday with UOP 2.225 L.     Review of Systems   Constitutional: Positive for activity change. Negative for appetite change, chills, fatigue, fever and unexpected weight change.   HENT: Negative for congestion, drooling, facial swelling and postnasal drip.    Eyes: Positive for visual disturbance. Negative for photophobia.   Respiratory: Positive for cough and shortness of breath. Negative for wheezing.    Cardiovascular: Negative for chest pain, palpitations and leg swelling.   Gastrointestinal: Negative for abdominal distention, abdominal pain, anal bleeding, blood in stool, constipation, diarrhea  and nausea.   Endocrine: Negative for polyphagia and polyuria.   Musculoskeletal: Negative for arthralgias, back pain, gait problem and joint swelling.   Skin: Negative for pallor and rash.   Neurological: Negative for dizziness, facial asymmetry, light-headedness and headaches.   Hematological: Negative for adenopathy. Does not bruise/bleed easily.     Objective:     Vital Signs (Most Recent):  Temp: 97.7 °F (36.5 °C) (05/22/20 0715)  Pulse: 80 (05/22/20 0909)  Resp: 16 (05/22/20 0909)  BP: (!) 155/87 (05/22/20 0909)  SpO2: (!) 94 % (05/22/20 0909) Vital Signs (24h Range):  Temp:  [97.6 °F (36.4 °C)-98.5 °F (36.9 °C)] 97.7 °F (36.5 °C)  Pulse:  [66-88] 80  Resp:  [16-25] 16  SpO2:  [93 %-96 %] 94 %  BP: (151-181)/() 155/87     Weight: 95.3 kg (210 lb)  Body mass index is 30.13 kg/m².    Intake/Output Summary (Last 24 hours) at 5/22/2020 0922  Last data filed at 5/22/2020 0708  Gross per 24 hour   Intake 60 ml   Output 2325 ml   Net -2265 ml      Physical Exam  Constitutional:   Mild respiratory distress, using accessory muscle use for breathing   HENT:   Mouth/Throat: Oropharynx is clear and moist. No oropharyngeal exudate.   Eyes: Conjunctivae are normal. No scleral icterus.   Neck: Normal range of motion. Neck supple. No JVD present. No tracheal deviation present. No thyromegaly present.   Cardiovascular: Normal rate, regular rhythm, normal heart sounds and intact distal pulses. Exam reveals no gallop and no friction rub.   No murmur heard.  Pulmonary/Chest: No stridor. Left lung base crackles. He exhibits no tenderness.   Coarse breath sounds   Abdominal: Soft. Bowel sounds are normal. +  PEG tube and no mass. There is no tenderness. There is no guarding.   Musculoskeletal: He exhibits no edema, tenderness or deformity.   Lymphadenopathy:     He has no cervical adenopathy.   Neurological:   Right sided weakness; upper and lower extremity, Dysarthria   Skin: Skin is warm. Capillary refill takes less than 2  seconds. No erythema. No pallor.      Significant Labs:   CBC:   Recent Labs   Lab 05/21/20  0507   WBC 10.12   HGB 12.6*   HCT 41.7        CMP:   Recent Labs   Lab 05/20/20  1549 05/21/20  0507    144   K 3.8 3.9   * 110   CO2 27 25   * 113*   BUN 33* 32*   CREATININE 0.8 0.8   CALCIUM 9.3 9.0   PROT 6.6 6.8   ALBUMIN 3.3* 3.3*   BILITOT 0.4 0.6   ALKPHOS 64 66   AST 24 24   ALT 21 22   ANIONGAP 6* 9   EGFRNONAA >60.0 >60.0     Significant Imaging: I have reviewed all pertinent imaging results/findings within the past 24 hours.    Assessment/Plan:     Respiratory Distress  75 years old male admitted for caudate and thalamic hemorraghic stroke initially in [facility] due to IV antihypertensives, stepped down to floor neurology team on 05/19/20.   -- tube feeds started on 05/19/20 at 10:32 am  -- patient remains afebrile, heart rate wnl, no leukocyte count  -- tachypnea noted 22-28 rate with accessory muscle use  -- Chest congestion, gargling, coarse breath sounds and bibasilar wheezing/crackles on exam  -- CXR unremarkable for lobar consolidation; has congestive changes  -- no signs of volume over load on exam, Echo with no diastolic or systolic dysfunction,   -- tested COVID19 negative on 05/20/20  -- had mild troponin elevation 0.4 - > 0.117 -> 0.113 likely type II demand ischemia in setting of hypertensive emergency  -- lasix naive, 20 mg IV given yesterday with UOP 2.0 L  -- lower extremity doppler ultrasound negative for DVT  -- abd XR negative for ileus, or constipation  Assessment: Respiratory distress likely due to aspiration vs atelectasis vs viral pneumonia   Plan:  -- pulmonary toilet with IS, chest PT, BID suction   -- Scheduled nebulization Levalbuterol/Ipatropium Q8H; PRN Q4H for wheezing and dsypnea  -- Hold tube feeds, and oral feeds for now. Aspiration precautions, HOB > 60 degrees  -- PEG tube feeds as tolerated; remove NG tube  -- lasix 20 mg IV once again today  -- Given  patient continues to be tachyapneic with new leukocytosis of 15k, will obtain CTA Chest to evaluate further etiologies of respiratory distress today    Infrarenal Aortic Aneurysm  -- No evidence of rupture per imaging scan, patient is HDS  -- Consider Vascular Surgery Consult for repair    Hypertension  -- poorly controlled; however in pm 120's systolic  -- On amlodipine, Coreg 12.5 mg, hydralazine 75 mg TID, HCTZ 25 mg,  Losartan 100 mg  -- HR in 70-80's  Reccs:  -- BP goal < 140/80 mmHg in patient with hemorraghic CVA and infrarenal AAA  -- PEG tube feeds as tolerated.      VTE Risk Mitigation (From admission, onward)         Ordered     enoxaparin injection 40 mg  Daily      05/18/20 1306     Reason for No Pharmacological VTE Prophylaxis  Once     Question:  Reasons:  Answer:  Risk of Bleeding    05/14/20 1101     IP VTE HIGH RISK PATIENT  Once      05/14/20 1101     Place sequential compression device  Until discontinued      05/14/20 1101              Thank you for your consult. I will follow-up with patient. Please contact us if you have any additional questions.      Patient discussed and seen with Dr. Ham. Further reccs per the attending addendum.      Otto Alanis MD   Internal Medicine PGY 3  Department of Hospital Medicine   [facility]                    05/22/2020                             STAFF PHYSICIAN NOTE                                   Attending Attestation for Rounds with Resident  I have reviewed and concur with the resident's history, physical, assessment, and plan.  I have personally interviewed and examined the patient at bedside and agree with the resident's findings.                                     Mitzi Ham MD  Senior Hospitalist  [id number], [phone]

## 2020-05-22 NOTE — PHYSICIAN QUERY
PT Name: Brett Vega  MR #: [id number]    Physician Query Form - Consultant Diagnosis Clarification     CDS: Saundra Good RN, CCDS       Contact information: [email or web address]    This form is a permanent document in the medical record.     Query Date: May 22, 2020      By submitting this query, we are merely seeking further clarification of documentation.  Please utilize your independent clinical judgment when addressing the question(s) below.      The Medical record contains the following:   Diagnosis Supporting Clinical Information Location in Medical Record ·   Mod-severe Oropharyngeal dysphagia · Patient demonstrates mod-severe Oropharyngeal dysphagia characterized by david silent aspiration of nectar-thickened liquids, aspiration of honey-thick liquid with tsp presentations.    Dysphagia  NG tube in place  Patient failed MBSS, now NPO per SLP  Will discuss PEG tube placement 5/21-SLP MBSS              5/21-Vascular Neuro PN         Do you agree with the Consultants diagnosis of ____Mod-severe Oropharyngeal dysphagia____?    [ x ] Yes   [  ] No   [  ] Other/Clarification of findings:   [  ] Clinically undetermined

## 2020-05-22 NOTE — PLAN OF CARE
Pt arrived to ir 190 for gastrostomy tube placement . Pt oriented to unit and staff. Plan of care reviewed with patient, patient verbalizes understanding. Comfort measures utilized. Pt safely transferred from stretcher to procedural table. Fall risk reviewed with patient, fall risk interventions maintained. Safety strap applied, positioner pillows utilized to minimize pressure points. Blankets applied. Pt prepped and draped utilizing standard sterile technique. Patient placed on continuous monitoring, as required by sedation policy. Timeouts completed utilizing standard universal time-out, per department and facility policy. RN to remain at bedside, continuous monitoring maintained. Pt resting comfortably. Denies pain/discomfort. Will continue to monitor. See flow sheets for monitoring, medication administration, and updates.

## 2020-05-22 NOTE — PT/OT/SLP PROGRESS
"Occupational Therapy   Treatment    Name: Brett Vega  MRN: [id number]  Admitting Diagnosis:  Nontraumatic intracerebral hemorrhage       Recommendations:     Discharge Recommendations: rehabilitation facility  Discharge Equipment Recommendations:  (TBD with progression in care)  Barriers to discharge:  (level of skilled assistance required)    Assessment:     Brett Vega is a 75 y.o. male with a medical diagnosis of Nontraumatic intracerebral hemorrhage.  He presents with R hemiparesis. He demo great gains towards OT goals at this time and would greatly benefit from rehab at post acute level of care. Performance deficits affecting function are weakness, impaired endurance, impaired self care skills, impaired functional mobilty, gait instability, impaired balance, decreased upper extremity function, decreased lower extremity function, visual deficits, impaired coordination, decreased ROM, decreased safety awareness, impaired cognition.     Rehab Prognosis:  Good; patient would benefit from acute skilled OT services to address these deficits and reach maximum level of function.       Plan:     Patient to be seen 4 x/week to address the above listed problems via self-care/home management, therapeutic activities, therapeutic exercises, neuromuscular re-education, cognitive retraining  · Plan of Care Expires: 06/13/20  · Plan of Care Reviewed with: patient    Subjective   Pt reported "I think I would do better if I wasn't so tired"  Pain/Comfort:  · Pain Rating 1: 0/10  · Pain Rating Post-Intervention 1: 0/10    Objective:     Communicated with: RN prior to session. Completed with PT. Pt day of PEG placement.     Patient found going from supine<>sit with PT with Condom Catheter, PEG Tube, telemetry, bed alarm, peripheral IV, pulse ox (continuous) upon OT entry to room.    General Precautions: Standard, aspiration, fall, vision impaired, NPO   Orthopedic Precautions:N/A   Braces: N/A     Occupational Performance: "   Bed Mobility:    · Patient completed Sit to Supine with moderate assistance   · edu on R UE handing techniques to prevent subluxation with need for reinforcement     Functional Mobility/Transfers:  · Patient completed Sit <> Stand Transfer with moderate assistance  with  no assistive device    · OT to ensure best positioning and handling for R UE with functional task  · edu pt on handling techniques    Activities of Daily Living:  · Grooming: minimum assistance postural control EOB; facilitation for R UE in weight bearing    [facility] 6 Click ADL: 12    Treatment & Education:  -Pt alert and agreeable to therapy session; re edu on therapy role in care  -EOB 22 min duration with CGA-min(A); facilitation for R UE in extended arm weight bearing  *AAROM for R UE in all functional planes x10 reps  *completed manual drainage massage to hand/digits foe pitting edema to R UE  -return to supine in upright position; R UE positioned in elevation and extension with abduction and open palm  -[facility] pt on weekend bed level exercises and proper R UE positioning techniques   -Communication board updated; questions/concerns addressed within OT scope of practice      Patient left HOB elevated with all lines intact, call button in reach and bed alarm onEducation:      GOALS:   Multidisciplinary Problems     Occupational Therapy Goals        Problem: Occupational Therapy Goal    Goal Priority Disciplines Outcome Interventions   Occupational Therapy Goal     OT, PT/OT Ongoing, Progressing    Description:  Goals to be met by: 5/29/2020     Patient will increase functional independence with ADLs by performing:    Grooming while standing with Moderate Assistance.  LB dressing with moderate assistance.  Toileting from bedside commode with Moderate Assistance for hygiene and clothing management.   Sitting at edge of bed x10 minutes with Minimal Assistance in preparation of functional seated grooming tasks. MET  *revised: with CGA x10 min and  midline orientation maintained  Stand pivot transfers with Moderate Assistance.  Toilet transfer to bedside commode with Moderate Assistance.                       Time Tracking:     OT Date of Treatment: 05/22/20  OT Start Time: 1354  OT Stop Time: 1417  OT Total Time (min): 23 min    Billable Minutes:Self Care/Home Management 10  Therapeutic Activity 13    DELLA Evans  5/22/2020

## 2020-05-22 NOTE — PLAN OF CARE
Problem: Physical Therapy Goal  Goal: Physical Therapy Goal  Description  PT goals until 5/28/20    1. Pt supine to sit with minimal assist-not met  2. Pt sit to supine with minimal assist-not met  3. Pt sit to stand with hemiwalker with minimal assist-not met  4. Pt to perform gait 5ft with hemiwalker with max assist.-not met  5. Pt to transfer bed to/from bedside chair with hemiwalker with moderate assist.-not met  6. Pt to perform B LE exs in sitting or supine x 15 reps to strengthen B LE to improve functional mobility.-not met  7. Pt to sit on the EOB ~ 10 min with CGA to allow pt to perform functional activities-not met    Outcome: Ongoing, Progressing   Pt's goals remain appropriate and pt will continue to benefit from skilled PT services to work towards improved functional mobility including: bed mobility, transfers, and gait.   Marissa Guajardo, PT  5/22/2020

## 2020-05-22 NOTE — PLAN OF CARE
Problem: Adult Inpatient Plan of Care  Goal: Plan of Care Review  Outcome: Ongoing, Progressing   Recommendations      1. When feasible, initiate tube feed with Isosource 1.5 @10ml/hr and increase by 10ml/hr Q4hrs to goal rate 60ml/hr (provides 2160 kcal, 98g pro,1094ml free water). Additional 250ml water flush Q6hrs or per MD. Hold for residuals >500ml.  Goals: 1. Pt to receive nutrition by RD follow up.  Nutrition Goal Status: new  Communication of RD Recs: (POC)

## 2020-05-22 NOTE — PLAN OF CARE
Problem: Adult Inpatient Plan of Care  Goal: Plan of Care Review  Outcome: Ongoing, Progressing   Plan of care reviewed with pt. Pt voiced understanding. Pt AAOx4. Pt denies any c/o during the shift. Pt on 3l nasal cannula. Pt is NPO. Pt has NGT. Administered barium @0110. Pt tolerated well. No significant shift events. BP elevated during shift. PRN hydralazine given. See MAR.  Fall precautions maintained. Bed in lowest position, and locked. Call light within reach and advised to call for assistance. Side rails x 2 and slip resistant socks on at this time. [facility].

## 2020-05-22 NOTE — SUBJECTIVE & OBJECTIVE
Neurologic Chief Complaint: Nontraumatic intracerebral hemorrhage    Subjective:     Interval History: Patient is seen for follow-up neurological assessment and treatment recommendations: patient doing well post peg placement, denies pain, denies shortness of breath, adjusting BP medications today and repeating CXR    HPI, Past Medical, Family, and Social History remains the same as documented in the initial encounter.     Review of Systems   Constitutional: Negative for chills and fever.   HENT: Positive for trouble swallowing.    Eyes: Negative for visual disturbance.   Respiratory: Negative for cough, chest tightness and shortness of breath.    Cardiovascular: Negative for chest pain.   Gastrointestinal: Negative for abdominal pain, constipation and vomiting.   Skin: Negative.    Neurological: Positive for facial asymmetry, speech difficulty and weakness.   Hematological: Negative.    Psychiatric/Behavioral: Negative.      Scheduled Meds:   amLODIPine  10 mg Oral Daily    carvediloL  12.5 mg Oral BID    enoxaparin  40 mg Subcutaneous Daily    hydrALAZINE  75 mg Oral Q8H    [START ON 5/23/2020] hydroCHLOROthiazide  25 mg Oral Daily    ipratropium  0.5 mg Nebulization Q8H    levalbuterol  0.63 mg Nebulization Q8H    losartan  100 mg Oral Daily    polyethylene glycol  17 g Oral Daily    senna-docusate 8.6-50 mg  1 tablet Oral BID    silodosin  4 mg Oral Daily     Continuous Infusions:    PRN Meds:acetaminophen, albuterol-ipratropium, hydrALAZINE, labetaloL, sodium chloride 0.9%    Objective:     Vital Signs (Most Recent):  Temp: 98.9 °F (37.2 °C) (05/22/20 1044)  Pulse: 77 (05/22/20 1015)  Resp: (!) 21 (05/22/20 1044)  BP: (!) 178/92 (05/22/20 1044)  SpO2: 97 % (05/22/20 1015)  BP Location: Left arm    Vital Signs Range (Last 24H):  Temp:  [97.6 °F (36.4 °C)-99 °F (37.2 °C)]   Pulse:  [66-88]   Resp:  [16-27]   BP: (144-181)/()   SpO2:  [93 %-98 %]   BP Location: Left arm    Physical Exam    Constitutional: He is oriented to person, place, and time. He appears well-developed and well-nourished. No distress.   HENT:   Head: Normocephalic and atraumatic.   Cardiovascular: Normal rate.   Pulmonary/Chest: Tachypnea noted. No respiratory distress.   Abdominal: He exhibits distension. There is no tenderness.   Neurological: He is alert and oriented to person, place, and time.   Skin: Skin is warm and dry.   Vitals reviewed.      Neurological Exam:   LOC: alert  Attention Span: Good   Language: No aphasia  Articulation: Dysarthria  Orientation: Not oriented to time  Visual Fields: Full  EOM (CN III, IV, VI): Full/intact  Facial Movement (CN VII): Symmetric facial expression    Motor: Arm left  Normal 5/5  Leg left  Paresis: 4/5  Arm right  Paresis: 1/5  Leg right Paresis: 1/5  Sensation: Intact to light touch, temperature and vibration  Tone: Spasticity  RUE and RLE     Laboratory:  CMP:   Recent Labs   Lab 05/22/20  1042   CALCIUM 9.1   ALBUMIN 3.5   PROT 7.1      K 3.7   CO2 26      BUN 34*   CREATININE 0.8   ALKPHOS 78   ALT 32   AST 30   BILITOT 0.5     CBC:   Recent Labs   Lab 05/22/20  1042   WBC 15.21*   RBC 4.89   HGB 14.1   HCT 46.6      MCV 95   MCH 28.8   MCHC 30.3*     Lipid Panel:   No results for input(s): CHOL, LDLCALC, HDL, TRIG in the last 168 hours.  Hgb A1C:   No results for input(s): HGBA1C in the last 168 hours.    Diagnostic Results     Brain Imaging   CT head (05/17/20)   - Left basal ganglia parenchymal hemorrhage with intraventricular extension again noted, the overall appearance is stable without evidence for significant interval detrimental change.     CT Head (05/14/20 6958)  Redemonstration of an acute parenchymal hemorrhage centered within the left basal ganglia with intraventricular extension.  Persistent ventricular prominence, stable in size when compared to prior study, in keeping with evolving hydrocephalus.    No evidence of new hemorrhage    CT Head  (05/14/20 1501)  Evolving acute parenchymal hemorrhage centered left basal ganglia with intraventricular extension.    Continued intraventricular hemorrhage with mild distention lateral and 3rd ventricles concerning for component of hydrocephalus.    No significant increased volume of hemorrhage.      CT head (05/14/20 0813):  Evidence of intraparenchymal hemorrhage centered in the region of the posterior limb of the left internal capsule with intraventricular extension as above.      Cardiac Imaging   TTE (05/15/20):  · Technically difficult portable study  · Normal left ventricular systolic function. The estimated ejection fraction is 55%.  · Normal right ventricular systolic function.  · Indeterminate left ventricular diastolic function.  · Normal central venous pressure (3 mmHg).

## 2020-05-23 LAB
ALBUMIN SERPL BCP-MCNC: 3.1 G/DL (ref 3.5–5.2)
ALP SERPL-CCNC: 71 U/L (ref 55–135)
ALT SERPL W/O P-5'-P-CCNC: 29 U/L (ref 10–44)
ANION GAP SERPL CALC-SCNC: 13 MMOL/L (ref 8–16)
AST SERPL-CCNC: 28 U/L (ref 10–40)
BASOPHILS # BLD AUTO: 0.01 K/UL (ref 0–0.2)
BASOPHILS NFR BLD: 0.1 % (ref 0–1.9)
BILIRUB SERPL-MCNC: 0.4 MG/DL (ref 0.1–1)
BUN SERPL-MCNC: 38 MG/DL (ref 8–23)
CALCIUM SERPL-MCNC: 9.5 MG/DL (ref 8.7–10.5)
CHLORIDE SERPL-SCNC: 108 MMOL/L (ref 95–110)
CO2 SERPL-SCNC: 30 MMOL/L (ref 23–29)
CREAT SERPL-MCNC: 0.9 MG/DL (ref 0.5–1.4)
DIFFERENTIAL METHOD: ABNORMAL
EOSINOPHIL # BLD AUTO: 0 K/UL (ref 0–0.5)
EOSINOPHIL NFR BLD: 0.1 % (ref 0–8)
ERYTHROCYTE [DISTWIDTH] IN BLOOD BY AUTOMATED COUNT: 13.5 % (ref 11.5–14.5)
EST. GFR  (AFRICAN AMERICAN): >60 ML/MIN/1.73 M^2
EST. GFR  (NON AFRICAN AMERICAN): >60 ML/MIN/1.73 M^2
GLUCOSE SERPL-MCNC: 98 MG/DL (ref 70–110)
HCT VFR BLD AUTO: 43.2 % (ref 40–54)
HGB BLD-MCNC: 13.4 G/DL (ref 14–18)
IMM GRANULOCYTES # BLD AUTO: 0.16 K/UL (ref 0–0.04)
IMM GRANULOCYTES NFR BLD AUTO: 1.4 % (ref 0–0.5)
LYMPHOCYTES # BLD AUTO: 1 K/UL (ref 1–4.8)
LYMPHOCYTES NFR BLD: 9 % (ref 18–48)
MAGNESIUM SERPL-MCNC: 2.6 MG/DL (ref 1.6–2.6)
MCH RBC QN AUTO: 28.5 PG (ref 27–31)
MCHC RBC AUTO-ENTMCNC: 31 G/DL (ref 32–36)
MCV RBC AUTO: 92 FL (ref 82–98)
MONOCYTES # BLD AUTO: 1.1 K/UL (ref 0.3–1)
MONOCYTES NFR BLD: 9.2 % (ref 4–15)
NEUTROPHILS # BLD AUTO: 9.3 K/UL (ref 1.8–7.7)
NEUTROPHILS NFR BLD: 80.2 % (ref 38–73)
NRBC BLD-RTO: 0 /100 WBC
PHOSPHATE SERPL-MCNC: 3.3 MG/DL (ref 2.7–4.5)
PLATELET # BLD AUTO: 291 K/UL (ref 150–350)
PMV BLD AUTO: 11 FL (ref 9.2–12.9)
POTASSIUM SERPL-SCNC: 3.9 MMOL/L (ref 3.5–5.1)
PROT SERPL-MCNC: 6.5 G/DL (ref 6–8.4)
RBC # BLD AUTO: 4.71 M/UL (ref 4.6–6.2)
SODIUM SERPL-SCNC: 151 MMOL/L (ref 136–145)
WBC # BLD AUTO: 11.61 K/UL (ref 3.9–12.7)

## 2020-05-23 PROCEDURE — 99233 SBSQ HOSP IP/OBS HIGH 50: CPT | Mod: ,,, | Performed by: PSYCHIATRY & NEUROLOGY

## 2020-05-23 PROCEDURE — 27000221 HC OXYGEN, UP TO 24 HOURS

## 2020-05-23 PROCEDURE — 94640 AIRWAY INHALATION TREATMENT: CPT

## 2020-05-23 PROCEDURE — 94664 DEMO&/EVAL PT USE INHALER: CPT

## 2020-05-23 PROCEDURE — 63600175 PHARM REV CODE 636 W HCPCS: Performed by: PSYCHIATRY & NEUROLOGY

## 2020-05-23 PROCEDURE — 94668 MNPJ CHEST WALL SBSQ: CPT

## 2020-05-23 PROCEDURE — 99900035 HC TECH TIME PER 15 MIN (STAT)

## 2020-05-23 PROCEDURE — 94799 UNLISTED PULMONARY SVC/PX: CPT

## 2020-05-23 PROCEDURE — 11000001 HC ACUTE MED/SURG PRIVATE ROOM

## 2020-05-23 PROCEDURE — 94761 N-INVAS EAR/PLS OXIMETRY MLT: CPT

## 2020-05-23 PROCEDURE — 80053 COMPREHEN METABOLIC PANEL: CPT

## 2020-05-23 PROCEDURE — 27000646 HC AEROBIKA DEVICE

## 2020-05-23 PROCEDURE — 36415 COLL VENOUS BLD VENIPUNCTURE: CPT

## 2020-05-23 PROCEDURE — 84100 ASSAY OF PHOSPHORUS: CPT

## 2020-05-23 PROCEDURE — 99233 PR SUBSEQUENT HOSPITAL CARE,LEVL III: ICD-10-PCS | Mod: ,,, | Performed by: HOSPITALIST

## 2020-05-23 PROCEDURE — 85025 COMPLETE CBC W/AUTO DIFF WBC: CPT

## 2020-05-23 PROCEDURE — 25000003 PHARM REV CODE 250: Performed by: NURSE PRACTITIONER

## 2020-05-23 PROCEDURE — 25000003 PHARM REV CODE 250: Performed by: PSYCHIATRY & NEUROLOGY

## 2020-05-23 PROCEDURE — 83735 ASSAY OF MAGNESIUM: CPT

## 2020-05-23 PROCEDURE — 25000003 PHARM REV CODE 250: Performed by: PHYSICIAN ASSISTANT

## 2020-05-23 PROCEDURE — 25000003 PHARM REV CODE 250: Performed by: STUDENT IN AN ORGANIZED HEALTH CARE EDUCATION/TRAINING PROGRAM

## 2020-05-23 PROCEDURE — 25000242 PHARM REV CODE 250 ALT 637 W/ HCPCS: Performed by: STUDENT IN AN ORGANIZED HEALTH CARE EDUCATION/TRAINING PROGRAM

## 2020-05-23 PROCEDURE — 99233 PR SUBSEQUENT HOSPITAL CARE,LEVL III: ICD-10-PCS | Mod: ,,, | Performed by: PSYCHIATRY & NEUROLOGY

## 2020-05-23 PROCEDURE — 99233 SBSQ HOSP IP/OBS HIGH 50: CPT | Mod: ,,, | Performed by: HOSPITALIST

## 2020-05-23 RX ORDER — FUROSEMIDE 20 MG/1
20 TABLET ORAL DAILY
Status: DISCONTINUED | OUTPATIENT
Start: 2020-05-23 | End: 2020-05-24

## 2020-05-23 RX ADMIN — HYDROCHLOROTHIAZIDE 25 MG: 25 TABLET ORAL at 08:05

## 2020-05-23 RX ADMIN — FUROSEMIDE 20 MG: 20 TABLET ORAL at 02:05

## 2020-05-23 RX ADMIN — HYDRALAZINE HYDROCHLORIDE 75 MG: 50 TABLET, FILM COATED ORAL at 02:05

## 2020-05-23 RX ADMIN — ENOXAPARIN SODIUM 40 MG: 100 INJECTION SUBCUTANEOUS at 04:05

## 2020-05-23 RX ADMIN — LEVALBUTEROL HYDROCHLORIDE 0.63 MG: 0.63 SOLUTION RESPIRATORY (INHALATION) at 11:05

## 2020-05-23 RX ADMIN — POLYETHYLENE GLYCOL 3350 17 G: 17 POWDER, FOR SOLUTION ORAL at 08:05

## 2020-05-23 RX ADMIN — SILODOSIN 4 MG: 4 CAPSULE ORAL at 08:05

## 2020-05-23 RX ADMIN — IPRATROPIUM BROMIDE 0.5 MG: 0.5 SOLUTION RESPIRATORY (INHALATION) at 11:05

## 2020-05-23 RX ADMIN — LEVALBUTEROL HYDROCHLORIDE 0.63 MG: 0.63 SOLUTION RESPIRATORY (INHALATION) at 12:05

## 2020-05-23 RX ADMIN — STANDARDIZED SENNA CONCENTRATE AND DOCUSATE SODIUM 1 TABLET: 8.6; 5 TABLET ORAL at 08:05

## 2020-05-23 RX ADMIN — IPRATROPIUM BROMIDE 0.5 MG: 0.5 SOLUTION RESPIRATORY (INHALATION) at 12:05

## 2020-05-23 RX ADMIN — HYDRALAZINE HYDROCHLORIDE 75 MG: 50 TABLET, FILM COATED ORAL at 09:05

## 2020-05-23 RX ADMIN — LOSARTAN POTASSIUM 100 MG: 50 TABLET ORAL at 08:05

## 2020-05-23 RX ADMIN — LEVALBUTEROL HYDROCHLORIDE 0.63 MG: 0.63 SOLUTION RESPIRATORY (INHALATION) at 03:05

## 2020-05-23 RX ADMIN — IPRATROPIUM BROMIDE 0.5 MG: 0.5 SOLUTION RESPIRATORY (INHALATION) at 03:05

## 2020-05-23 RX ADMIN — LEVALBUTEROL HYDROCHLORIDE 0.63 MG: 0.63 SOLUTION RESPIRATORY (INHALATION) at 07:05

## 2020-05-23 RX ADMIN — CARVEDILOL 12.5 MG: 12.5 TABLET, FILM COATED ORAL at 08:05

## 2020-05-23 RX ADMIN — HYDRALAZINE HYDROCHLORIDE 75 MG: 50 TABLET, FILM COATED ORAL at 05:05

## 2020-05-23 RX ADMIN — AMLODIPINE BESYLATE 10 MG: 10 TABLET ORAL at 08:05

## 2020-05-23 RX ADMIN — IPRATROPIUM BROMIDE 0.5 MG: 0.5 SOLUTION RESPIRATORY (INHALATION) at 07:05

## 2020-05-23 NOTE — SUBJECTIVE & OBJECTIVE
Neurologic Chief Complaint: Nontraumatic intracerebral hemorrhage    Subjective:     Interval History: Patient is seen for follow-up neurological assessment and treatment recommendations: Started tube feeds and enteral water boluses through PEG tube today     HPI, Past Medical, Family, and Social History remains the same as documented in the initial encounter.     Review of Systems   Constitutional: Negative for chills and fever.   HENT: Positive for trouble swallowing.    Eyes: Negative for visual disturbance.   Respiratory: Negative for cough, chest tightness and shortness of breath.    Cardiovascular: Negative for chest pain.   Gastrointestinal: Negative for abdominal pain, constipation and vomiting.   Skin: Negative.    Neurological: Positive for facial asymmetry, speech difficulty and weakness.   Hematological: Negative.    Psychiatric/Behavioral: Negative.      Scheduled Meds:   amLODIPine  10 mg Oral Daily    carvediloL  12.5 mg Oral BID    enoxaparin  40 mg Subcutaneous Daily    hydrALAZINE  75 mg Oral Q8H    hydroCHLOROthiazide  25 mg Oral Daily    ipratropium  0.5 mg Nebulization Q8H    levalbuterol  0.63 mg Nebulization Q8H    losartan  100 mg Oral Daily    polyethylene glycol  17 g Oral Daily    senna-docusate 8.6-50 mg  1 tablet Oral BID    silodosin  4 mg Oral Daily     Continuous Infusions:    PRN Meds:acetaminophen, albuterol-ipratropium, hydrALAZINE, labetaloL, sodium chloride 0.9%    Objective:     Vital Signs (Most Recent):  Temp: 98 °F (36.7 °C) (05/23/20 0700)  Pulse: 77 (05/23/20 0714)  Resp: (!) 21 (05/23/20 0714)  BP: (!) 144/84 (05/23/20 0700)  SpO2: (!) 92 % (05/23/20 0714)  BP Location: Left arm    Vital Signs Range (Last 24H):  Temp:  [97 °F (36.1 °C)-98.5 °F (36.9 °C)]   Pulse:  [66-84]   Resp:  [18-32]   BP: (126-160)/(77-92)   SpO2:  [92 %-96 %]   BP Location: Left arm    Physical Exam   Constitutional: He is oriented to person, place, and time. He appears well-developed and  well-nourished. No distress.   HENT:   Head: Normocephalic and atraumatic.   Cardiovascular: Normal rate.   Pulmonary/Chest: No tachypnea. No respiratory distress.   Abdominal: He exhibits distension. There is no tenderness.   Neurological: He is alert and oriented to person, place, and time.   Skin: Skin is warm and dry.   Vitals reviewed.      Neurological Exam:   LOC: alert  Attention Span: Good   Language: No aphasia  Articulation: Dysarthria  Orientation: Not oriented to time  Visual Fields: Full  EOM (CN III, IV, VI): Full/intact  Facial Movement (CN VII): asymmetric facial expression    Motor: Arm left  Normal 5/5  Leg left  Paresis: 4/5  Arm right  Paresis: 2/5  Leg right Paresis: 2/5  Sensation: Intact to light touch, temperature and vibration  Tone:normal     Laboratory:  CMP:   Recent Labs   Lab 05/23/20  0445   CALCIUM 9.5   ALBUMIN 3.1*   PROT 6.5   *   K 3.9   CO2 30*      BUN 38*   CREATININE 0.9   ALKPHOS 71   ALT 29   AST 28   BILITOT 0.4     CBC:   Recent Labs   Lab 05/23/20  0445   WBC 11.61   RBC 4.71   HGB 13.4*   HCT 43.2      MCV 92   MCH 28.5   MCHC 31.0*     Lipid Panel:   No results for input(s): CHOL, LDLCALC, HDL, TRIG in the last 168 hours.  Hgb A1C:   No results for input(s): HGBA1C in the last 168 hours.    Diagnostic Results     Brain Imaging   CT head (05/17/20)   - Left basal ganglia parenchymal hemorrhage with intraventricular extension again noted, the overall appearance is stable without evidence for significant interval detrimental change.     CT Head (05/14/20 2101)  Redemonstration of an acute parenchymal hemorrhage centered within the left basal ganglia with intraventricular extension.  Persistent ventricular prominence, stable in size when compared to prior study, in keeping with evolving hydrocephalus.    No evidence of new hemorrhage    CT Head (05/14/20 7046)  Evolving acute parenchymal hemorrhage centered left basal ganglia with intraventricular  extension.    Continued intraventricular hemorrhage with mild distention lateral and 3rd ventricles concerning for component of hydrocephalus.    No significant increased volume of hemorrhage.      CT head (05/14/20 0813):  Evidence of intraparenchymal hemorrhage centered in the region of the posterior limb of the left internal capsule with intraventricular extension as above.      Cardiac Imaging   TTE (05/15/20):  · Technically difficult portable study  · Normal left ventricular systolic function. The estimated ejection fraction is 55%.  · Normal right ventricular systolic function.  · Indeterminate left ventricular diastolic function.  · Normal central venous pressure (3 mmHg).

## 2020-05-23 NOTE — ASSESSMENT & PLAN NOTE
76 yo M with no known PMHx presents to [facility] 05/14/2020 transferred from OSH for large L thalamic and caudate ICH with intraventricular extension.  Of note, patient had SBP 240s documented at OSH.  Pt otherwise has no clear risk factors for ICH. No recent trauma.  No unexplained weight loss or B symptoms.  Admitted to Neuro ICU w/ NSGY following.  Etiology likely hypertensive, ruled out vascular malformation w/ further imaging. Repeat CT Head in 5/17: stable without evidence for significant interval detrimental change.        Antithrombotics for secondary stroke prevention:  None--ICH  Statins for secondary stroke prevention/HLD: LDL elevated--would not start statin acutely in setting of ICH, but could discuss low dose statin for reduction of stroke risk factors in stroke clinic follow up  Aggressive risk factor modification: HTN  Rehab efforts: PT/OT/SLP/PM&R, inpatient rehab   Diagnostics ordered/pending: none  VTE prophylaxis: None: Reason for No Pharmacological VTE Prophylaxis: History of systemic or intracranial bleeding  BP parameters: ICH: SBP <140

## 2020-05-23 NOTE — ASSESSMENT & PLAN NOTE
Patient with increased work of breathing, tachypnea, and hypoxia.  Chest x-ray with pulmonary edema.  Patient afebrile with no leukocytosis  -IVF discontinued.   -Lasix 20 mg given with good output, but no sustained improvement noted  -BNP - 310  -Troponin mildly elevated but trending down - most likely ischemic demand  -EKG with no acute changes.  -COVID test negative  -Consult to IM - appreciate recommendations   -- pulmonary toilet with IS, chest PT, BID suction    -- Scheduled nebulization Levalbuterol/Ipatropium Q8H; PRN Q4H for wheezing and dsypnea   -- Hold tube feeds, and oral feeds for now. Aspiration precautions, HOB > 60 degrees, await barium study results   -- LE US negative for DVT   -- abd XR negative for ileus or constipation    -- repeating CXR, may need additional lasix dose   --CTA of chest negative for PE.  Bibasilar atelectasis

## 2020-05-23 NOTE — PROGRESS NOTES
[facility]  Vascular Neurology  Comprehensive Stroke Center  Progress Note    Assessment/Plan:     * Nontraumatic intracerebral hemorrhage  74 yo M with no known PMHx presents to [facility] [place]/14/2020 transferred from OSH for large L thalamic and caudate ICH with intraventricular extension.  Of note, patient had SBP 240s documented at OSH.  Pt otherwise has no clear risk factors for ICH. No recent trauma.  No unexplained weight loss or B symptoms.  Admitted to Neuro ICU w/ NSGY following.  Etiology likely hypertensive, ruled out vascular malformation w/ further imaging. Repeat CT Head in 5/17: stable without evidence for significant interval detrimental change.        Antithrombotics for secondary stroke prevention:  None--ICH  Statins for secondary stroke prevention/HLD: LDL elevated--would not start statin acutely in setting of ICH, but could discuss low dose statin for reduction of stroke risk factors in stroke clinic follow up  Aggressive risk factor modification: HTN  Rehab efforts: PT/OT/SLP/PM&R, inpatient rehab   Diagnostics ordered/pending: none  VTE prophylaxis: None: Reason for No Pharmacological VTE Prophylaxis: History of systemic or intracranial bleeding  BP parameters: ICH: SBP <140    AAA (abdominal aortic aneurysm) without rupture  Found incidentally on CT abdomen ordered for PEG placement  7x6.7 cm  Vascular surgery consulted and no acute intervention warranted at this time  Needs follow up outpatient     Dysphagia  NG tube in place  Patient failed MBSS, now NPO per SLP  PEG tube placed 5/22    Respiratory distress  Patient with increased work of breathing, tachypnea, and hypoxia.  Chest x-ray with pulmonary edema.  Patient afebrile with no leukocytosis  -IVF discontinued.   -Lasix 20 mg given with good output, but no sustained improvement noted  -BNP - 310  -Troponin mildly elevated but trending down - most likely ischemic demand  -EKG with no acute changes.  -COVID test  negative  -Consult to IM - appreciate recommendations   -- pulmonary toilet with IS, chest PT, BID suction    -- Scheduled nebulization Levalbuterol/Ipatropium Q8H; PRN Q4H for wheezing and dsypnea   -- Hold tube feeds, and oral feeds for now. Aspiration precautions, HOB > 60 degrees, await barium study results   -- LE US negative for DVT   -- abd XR negative for ileus or constipation    -- repeating CXR, may need additional lasix dose   -- CTA did not show PE or pneumonia          Dysarthria  -2/2 ICH, continue SLP eval and treat    Hemiparesis, right  -2/2 ICH, continue PT eval and treat  recommending discharge to inpatient rehab    Vasogenic cerebral edema  Area of vasogenic cerebral edema identified when reviewing brain imaging in the L basal ganglia. There is mass effect associated with it. We will continue to monitor the patients clinical exam for any worsening of symptoms which may indicate expansion of the hemorrhage or the area of the edema resulting in the clinical change. The ICH is likely 2/2 uncontrolled HTN.      Essential hypertension  -Stroke risk factor, likely etiology of ICH in this pt  - Med regiment adjusted:   Coreg 12.5mg BID   Norvasc 10mg Daily   Increased HCTZ to 25 mg Daily   - weaning hydralazine, switched from hydralazine 75 mg QID to TID             05[place]14/2020:  [place] [facility] [place] [facility] for L ICH/IVH from caudate and thalamus  05/15/2020:   Continues on cardene for SBP < 140, neurosurgery following.  05/16/2020:   Started back on cardene for SBP < 140 this AM  05/18/2020:  Off Cardene drip, placed on Losartan and Hydralazine. Lovenox.  05/19/2010:  /180. Still with Rt side weakness.   5/20: Stepped down overnight. Increased work of breathing, tachypnic.  IVF discontinued.  Crackles in bases and expiratory wheezes.  CXR with pulmonary edema.  Lasix given with good output but no sustained improvement of breathing.  Currently COVID rule out.  IM following. Blood  pressure elevated - changed to carvedilol 12.5mg BID, Norvasc 10mg daily, with plans to start HCTZ tomorrow.  Continuing Hydralazine until BP stable and can change to PRN only. TF on hold due to respiratory status.  Scheduled for MBSS tomorrow if able to tolerate.  5/21 COVID test negative, tachypnea persisting, hospital medicine assisting, abdominal XR and US LE pending, additional IV lasix dose ordered, patient failed MBSS, will discuss PEG placement  5/22 patient had PEG placed this morning, weaning hydralazine and increasing HCTZ. Repeat CXR pending to f/u on pulmonary edema  05/23 Started tube feeds and enteral water boluses through PEG tube today     STROKE DOCUMENTATION   Acute Stroke Times   Last Known Normal Time: 0300  Symptom Onset Date: 05/14/20  Symptom Onset Time: 0700  Stroke Team Called Time: 0806  Stroke Team Arrival Time: 0809  CT Interpretation Time: 0809  Decision to Treat Time for Alteplase: (Contraindicated 2/2 ICH)  Decision to Treat Time for IR: (no LVO)    NIH Scale:  1a. Level of Consciousness: 0-->Alert, keenly responsive  1b. LOC Questions: 0-->Answers both questions correctly  1c. LOC Commands: 0-->Performs both tasks correctly  2. Best Gaze: 0-->Normal  3. Visual: 0-->No visual loss  4. Facial Palsy: 1-->Minor paralysis (flattened nasolabial fold, asymmetry on smiling)  5a. Motor Arm, Left: 0-->No drift, limb holds 90 (or 45) degrees for full 10 secs  5b. Motor Arm, Right: 3-->No effort against gravity, limb falls  6a. Motor Leg, Left: 0-->No drift, leg holds 30 degree position for full 5 secs  6b. Motor Leg, Right: 3-->No effort against gravity, leg falls to bed immediately  7. Limb Ataxia: 0-->Absent  8. Sensory: 0-->Normal, no sensory loss  9. Best Language: 0-->No aphasia, normal  10. Dysarthria: 1-->Mild-to-moderate dysarthria, patient slurs at least some words and, at worst, can be understood with some difficulty  11. Extinction and Inattention (formerly Neglect): 0-->No  abnormality  Total (NIH Stroke Scale): 8       Modified [place] Score: 0  [place] Coma Scale:    ABCD2 Score:    [id number] Score:   HAS -BLED Score:   ICH Score:1  Hunt & Mccray Classification:      Hemorrhagic change of an Ischemic Stroke: Does this patient have an ischemic stroke with hemorrhagic changes? No     Neurologic Chief Complaint: Nontraumatic intracerebral hemorrhage    Subjective:     Interval History: Patient is seen for follow-up neurological assessment and treatment recommendations: Started tube feeds and enteral water boluses through PEG tube today     HPI, Past Medical, Family, and Social History remains the same as documented in the initial encounter.     Review of Systems   Constitutional: Negative for chills and fever.   HENT: Positive for trouble swallowing.    Eyes: Negative for visual disturbance.   Respiratory: Negative for cough, chest tightness and shortness of breath.    Cardiovascular: Negative for chest pain.   Gastrointestinal: Negative for abdominal pain, constipation and vomiting.   Skin: Negative.    Neurological: Positive for facial asymmetry, speech difficulty and weakness.   Hematological: Negative.    Psychiatric/Behavioral: Negative.      Scheduled Meds:   amLODIPine  10 mg Oral Daily    carvediloL  12.5 mg Oral BID    enoxaparin  40 mg Subcutaneous Daily    hydrALAZINE  75 mg Oral Q8H    hydroCHLOROthiazide  25 mg Oral Daily    ipratropium  0.5 mg Nebulization Q8H    levalbuterol  0.63 mg Nebulization Q8H    losartan  100 mg Oral Daily    polyethylene glycol  17 g Oral Daily    senna-docusate 8.6-50 mg  1 tablet Oral BID    silodosin  4 mg Oral Daily     Continuous Infusions:    PRN Meds:acetaminophen, albuterol-ipratropium, hydrALAZINE, labetaloL, sodium chloride 0.9%    Objective:     Vital Signs (Most Recent):  Temp: 98 °F (36.7 °C) (05/23/20 0700)  Pulse: 77 (05/23/20 0714)  Resp: (!) 21 (05/23/20 0714)  BP: (!) 144/84 (05/23/20 0700)  SpO2: (!) 92 % (05/23/20  0714)  BP Location: Left arm    Vital Signs Range (Last 24H):  Temp:  [97 °F (36.1 °C)-98.5 °F (36.9 °C)]   Pulse:  [66-84]   Resp:  [18-32]   BP: (126-160)/(77-92)   SpO2:  [92 %-96 %]   BP Location: Left arm    Physical Exam   Constitutional: He is oriented to person, place, and time. He appears well-developed and well-nourished. No distress.   HENT:   Head: Normocephalic and atraumatic.   Cardiovascular: Normal rate.   Pulmonary/Chest: No tachypnea. No respiratory distress.   Abdominal: He exhibits distension. There is no tenderness.   Neurological: He is alert and oriented to person, place, and time.   Skin: Skin is warm and dry.   Vitals reviewed.      Neurological Exam:   LOC: alert  Attention Span: Good   Language: No aphasia  Articulation: Dysarthria  Orientation: Not oriented to time  Visual Fields: Full  EOM (CN III, IV, VI): Full/intact  Facial Movement (CN VII): asymmetric facial expression    Motor: Arm left  Normal 5/5  Leg left  Paresis: 4/5  Arm right  Paresis: 2/5  Leg right Paresis: 2/5  Sensation: Intact to light touch, temperature and vibration  Tone:normal     Laboratory:  CMP:   Recent Labs   Lab 05/23/20  0445   CALCIUM 9.5   ALBUMIN 3.1*   PROT 6.5   *   K 3.9   CO2 30*      BUN 38*   CREATININE 0.9   ALKPHOS 71   ALT 29   AST 28   BILITOT 0.4     CBC:   Recent Labs   Lab 05/23/20  0445   WBC 11.61   RBC 4.71   HGB 13.4*   HCT 43.2      MCV 92   MCH 28.5   MCHC 31.0*     Lipid Panel:   No results for input(s): CHOL, LDLCALC, HDL, TRIG in the last 168 hours.  Hgb A1C:   No results for input(s): HGBA1C in the last 168 hours.    Diagnostic Results     Brain Imaging   CT head (05/17/20)   - Left basal ganglia parenchymal hemorrhage with intraventricular extension again noted, the overall appearance is stable without evidence for significant interval detrimental change.     CT Head (05/14/20 0209)  Redemonstration of an acute parenchymal hemorrhage centered within the left basal  ganglia with intraventricular extension.  Persistent ventricular prominence, stable in size when compared to prior study, in keeping with evolving hydrocephalus.    No evidence of new hemorrhage    CT Head (05/14/20 1501)  Evolving acute parenchymal hemorrhage centered left basal ganglia with intraventricular extension.    Continued intraventricular hemorrhage with mild distention lateral and 3rd ventricles concerning for component of hydrocephalus.    No significant increased volume of hemorrhage.      CT head (05/14/20 0813):  Evidence of intraparenchymal hemorrhage centered in the region of the posterior limb of the left internal capsule with intraventricular extension as above.      Cardiac Imaging   TTE (05/15/20):  · Technically difficult portable study  · Normal left ventricular systolic function. The estimated ejection fraction is 55%.  · Normal right ventricular systolic function.  · Indeterminate left ventricular diastolic function.  · Normal central venous pressure (3 mmHg).           Aliiva Bro PA-C  Comprehensive Stroke Center  Department of Vascular Neurology   [facility]

## 2020-05-23 NOTE — SUBJECTIVE & OBJECTIVE
[place] of Systems   Constitutional: Negative for chills and fever.   HENT:        NG tube in   Respiratory: Negative for choking and shortness of breath.    Cardiovascular: Negative for chest pain.   Gastrointestinal: Negative for abdominal pain.   Genitourinary: Negative for difficulty urinating (cordova).   Musculoskeletal: Negative for arthralgias.   Skin: Negative for rash.   Allergic/Immunologic: Negative for immunocompromised state.   Neurological: Positive for weakness (R hemiparesis).   Hematological: Negative for adenopathy.   Psychiatric/Behavioral: Negative for agitation.        Objective:     Vital Signs (Most Recent):  Temp: 98 °F (36.7 °C) (05/23/20 0700)  Pulse: 77 (05/23/20 0714)  Resp: (!) 21 (05/23/20 0714)  BP: (!) 144/84 (05/23/20 0700)  SpO2: (!) 92 % (05/23/20 0714) Vital Signs (24h Range):  Temp:  [97 °F (36.1 °C)-98.9 °F (37.2 °C)] 98 °F (36.7 °C)  Pulse:  [66-84] 77  Resp:  [18-32] 21  SpO2:  [92 %-97 %] 92 %  BP: (126-178)/(77-92) 144/84     Weight: 95.3 kg (210 lb)  Body mass index is 30.13 kg/m².    Intake/Output Summary (Last 24 hours) at 5/23/2020 0958  Last data filed at 5/23/2020 0700  Gross per 24 hour   Intake 90 ml   Output 1900 ml   Net -1810 ml      Physical Exam   Constitutional: He is oriented to person, place, and time. He appears well-nourished. No distress.   HENT:   Head: Atraumatic.   Eyes: EOM are normal. Right eye exhibits no discharge. Left eye exhibits no discharge.   Neck: Neck supple.   Cardiovascular: Normal rate. Exam reveals no gallop and no friction rub.   No murmur heard.  Pulmonary/Chest: No respiratory distress.   Coarse breath sounds   Abdominal: Soft. He exhibits no distension. There is no tenderness.   Musculoskeletal: He exhibits no edema.   RUE and RLE: minimal voluntary motion. RUE stiff, postured.   Neurological: He is alert and oriented to person, place, and time. No sensory deficit (states sensation to fine touch intact bilaterally).   Skin: Skin is  dry.   Psychiatric: He has a normal mood and affect.       Significant Labs:   CBC:   Recent Labs   Lab 05/22/20  1042 05/23/20  0445   WBC 15.21* 11.61   HGB 14.1 13.4*   HCT 46.6 43.2    291     CMP:   Recent Labs   Lab 05/22/20  1042 05/23/20  0445    151*   K 3.7 3.9    108   CO2 26 30*    98   BUN 34* 38*   CREATININE 0.8 0.9   CALCIUM 9.1 9.5   PROT 7.1 6.5   ALBUMIN 3.5 3.1*   BILITOT 0.5 0.4   ALKPHOS 78 71   AST 30 28   ALT 32 29   ANIONGAP 11 13   EGFRNONAA >60.0 >60.0       Significant Imaging: I have reviewed all pertinent imaging results/findings within the past 24 hours.

## 2020-05-23 NOTE — ASSESSMENT & PLAN NOTE
76 y/o male admitted for caudate and thalamic hemorrhagic stroke initially in [facility] due to IV antihypertensives, stepped down to floor neurology team on 05/19/20.   -- tube feeds started on 05/19/20 at 10:32 am  -- patient remains afebrile, heart rate wnl, no leukocyte count  -- tachypnea noted 22-28 rate with accessory muscle use  -- Chest congestion, gargling, coarse breath sounds and bibasilar wheezing/crackles on exam  -- CXR unremarkable for lobar consolidation; has congestive changes  -- no signs of volume over load on exam, Echo with no diastolic or systolic dysfunction,   -- tested COVID19 negative on 05/20/20  -- had mild troponin elevation 0.4 - > 0.117 -> 0.113 likely type II demand ischemia in setting of hypertensive emergency  -- lasix naive, 20 mg IV given yesterday with UOP 2.0 L  -- lower extremity doppler ultrasound negative for DVT  -- abd XR negative for ileus, or constipation  -- CTA obtained 5/22 which was negative for PE    Assessment: Respiratory distress likely due to aspiration vs atelectasis vs viral pneumonia     Plan:  -- pulmonary toilet with IS, chest PT, suctioning, acappella  -- encourage ambulation as tolerated. Out of bed, up to chair every morning as tolerated  -- Scheduled nebulization Levalbuterol/Ipatropium Q8H; PRN Q4H for wheezing and dsypnea  -- Hold oral feeds for now. Aspiration precautions, HOB > 60 degrees  -- PEG tube feeds, advance as tolerated; remove NG tube; RD consult for tube feeds recs  -- lasix seems to be helping him as he is much less SOB today. Will continue PO lasix 20 daily via G tube. Monitor UOP and electrolytes

## 2020-05-23 NOTE — PROGRESS NOTES
[facility] Medicine  Progress Note    Patient Name: Brett Vega  MRN: [id number]  Patient Class: IP- Inpatient   Admission Date: 5/14/2020  Length of Stay: 9 days  Attending Physician: Whitney Simon MD  Primary Care Provider: Elyse Parham MD        Subjective:     Principal Problem:Nontraumatic intracerebral hemorrhage        HPI:  Mr. Vega is a 74 yo male with no significant past medical history transferred from [facility] after he presented there for new onset of dense right hemiparesis and marked dysarthria. Onset unclear but likely around 3am on 05/14/20. No recent illness. Legally blind. BP on arrival to outside facility 247/129. Cardene ggt initiated. 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 Telemedicine-stroke done by Neurology and patient transferred to [facility] on 05/15/20 and admitted under [facility] service. Upon arrival, pt awake, alert, oriented, following commands. No movement against gravity on the right side. On Cardene ggt.      Hospital Course: 05/15/2020: SBP < 160, add hydralazine, PT/OT, evd watch  5/16/2020: increase Hydralazine, add losartan, start tube feeds, follow up CTH in AM, add silodosin and place cordova catheter, replace electrolytes PRN  5/17/2020: CT head stable, advance TF, add labetalol 100 q8hr, d/c cardene gtt  5/18/2020: required one prn dose of labetalol at 3 AM, scheduled hydralizine changed to q6 hrs, trial of puree consistency today although I do not feel patients swallowing is at point where can eat unattended or large quantities of food  5/19/20 Losartan increased to 100, hydralazine increased to 75. Resuming TF. Modified barium swallow test ordered. Nebs q6hr PRN. Transferred to Stroke Floor.   05/20/20: Patient on NG tube feeds, tachypnea and complaint of mild intermittent cough and associated dyspnea.     Hospital Medicine team consulted on  05/20/20 for respiratory distress.  Patient remains afebrile, heart rate wnl, no leukocyte count. tachypnea noted 22-28 rate with accessory muscle use. Chest congestion, gargling, coarse breath sounds and bibasilar wheezing on exam. CXR unremarkable for lobar consolidation, however reveals pulmonary congestive changes. no signs of volume over load on exam, Echo with no diastolic or systolic dysfunction. Primary team gave 20 IV lasix with UOP 2 L in 24 hours. COVID19 negative 05/20/20 05/21/20: Patient seen and examined at the bedside. Continues to be verbally responsive with mild dysarthria and right sided upper and lower extremity weakness. Oral and tube feeds on hold. Tachyapnea, with accessary muscles use but notes improvement in breathing compared to yesterday. Requiring 2.0 L nasal canula to maintain sats 95%. Scheduled for modified barium swallow study today.     05/22/20: Patient seen and examined at the bedside. No apparent concerns. Patient failed the swallowing study and today is s/p PEG tube placement per IR, patient tolerated the procedure well. Compared to yesterday, respiratory status is better. Remains NPO. RR 20-25. US LE doppler is negative for DVT. CXR unremarkable for ileus. CT abd w/ contrast consistent with infrarenal AAA 7 x 6.7 cm. Receiving scheduled neutralization. Lasix 20 mg given yesterday with UOP 2.225 L.     Interval history (5/23/20):  Patient seen and examined at the bedside. No acute events overnight. PEG tube and NG tube in place, plan to possibly remove NG and begin PEG tube feeds per RN. Remains NPO. Pt states SOB is improved today. Vascular surgery recommending outpatient follow up for AAA. Receiving scheduled nebs, patient encouraged to use IS. UOP 1.4 L yesterday. CTA negative for acute PE.     Overview/Hospital Course:  No notes on file    IReview of Systems   Constitutional: Negative for chills and fever.   HENT:        NG tube in   Respiratory: Negative for choking and  shortness of breath.    Cardiovascular: Negative for chest pain.   Gastrointestinal: Negative for abdominal pain.   Genitourinary: Negative for difficulty urinating (cordova).   Musculoskeletal: Negative for arthralgias.   Skin: Negative for rash.   Allergic/Immunologic: Negative for immunocompromised state.   Neurological: Positive for weakness (R hemiparesis).   Hematological: Negative for adenopathy.   Psychiatric/Behavioral: Negative for agitation.        Objective:     Vital Signs (Most Recent):  Temp: 98 °F (36.7 °C) (05/23/20 0700)  Pulse: 77 (05/23/20 0714)  Resp: (!) 21 (05/23/20 0714)  BP: (!) 144/84 (05/23/20 0700)  SpO2: (!) 92 % (05/23/20 0714) Vital Signs (24h Range):  Temp:  [97 °F (36.1 °C)-98.9 °F (37.2 °C)] 98 °F (36.7 °C)  Pulse:  [66-84] 77  Resp:  [18-32] 21  SpO2:  [92 %-97 %] 92 %  BP: (126-178)/(77-92) 144/84     Weight: 95.3 kg (210 lb)  Body mass index is 30.13 kg/m².    Intake/Output Summary (Last 24 hours) at 5/23/2020 0958  Last data filed at 5/23/2020 0700  Gross per 24 hour   Intake 90 ml   Output 1900 ml   Net -1810 ml      Physical Exam   Constitutional: He is oriented to person, place, and time. He appears well-nourished. No distress.   HENT:   Head: Atraumatic.   Eyes: EOM are normal. Right eye exhibits no discharge. Left eye exhibits no discharge.   Neck: Neck supple.   Cardiovascular: Normal rate. Exam reveals no gallop and no friction rub.   No murmur heard.  Pulmonary/Chest: No respiratory distress.   Coarse breath sounds   Abdominal: Soft. He exhibits no distension. There is no tenderness.   Musculoskeletal: He exhibits no edema.   RUE and RLE: minimal voluntary motion. RUE stiff, postured.   Neurological: He is alert and oriented to person, place, and time. No sensory deficit (states sensation to fine touch intact bilaterally).   Skin: Skin is dry.   Psychiatric: He has a normal mood and affect.       Significant Labs:   CBC:   Recent Labs   Lab 05/22/20  1042 05/23/20  3158    WBC 15.21* 11.61   HGB 14.1 13.4*   HCT 46.6 43.2    291     CMP:   Recent Labs   Lab 05/22/20  1042 05/23/20  0445    151*   K 3.7 3.9    108   CO2 26 30*    98   BUN 34* 38*   CREATININE 0.8 0.9   CALCIUM 9.1 9.5   PROT 7.1 6.5   ALBUMIN 3.5 3.1*   BILITOT 0.5 0.4   ALKPHOS 78 71   AST 30 28   ALT 32 29   ANIONGAP 11 13   EGFRNONAA >60.0 >60.0       Significant Imaging: I have reviewed all pertinent imaging results/findings within the past 24 hours.      Assessment/Plan:      * Nontraumatic intracerebral hemorrhage  -- management per primary  -- PT/OT recommending inpatient rehab    AAA (abdominal aortic aneurysm) without rupture  -- incidental finding when obtaining CT Abdomen for PEG tube placement  -- No evidence of rupture per imaging scan, patient is HDS  -- Vascular Surgery Consult for repair-->rec'ed outpatient followup (see their note dated 5/22)    Respiratory distress  76 y/o male admitted for caudate and thalamic hemorrhagic stroke initially in [facility] due to IV antihypertensives, stepped down to floor neurology team on 05/19/20.   -- tube feeds started on 05/19/20 at 10:32 am  -- patient remains afebrile, heart rate wnl, no leukocyte count  -- tachypnea noted 22-28 rate with accessory muscle use  -- Chest congestion, gargling, coarse breath sounds and bibasilar wheezing/crackles on exam  -- CXR unremarkable for lobar consolidation; has congestive changes  -- no signs of volume over load on exam, Echo with no diastolic or systolic dysfunction,   -- tested COVID19 negative on 05/20/20  -- had mild troponin elevation 0.4 - > 0.117 -> 0.113 likely type II demand ischemia in setting of hypertensive emergency  -- lasix naive, 20 mg IV given yesterday with UOP 2.0 L  -- lower extremity doppler ultrasound negative for DVT  -- abd XR negative for ileus, or constipation  -- CTA obtained 5/22 which was negative for PE    Assessment: Respiratory distress likely due to aspiration vs  atelectasis vs viral pneumonia     Plan:  -- pulmonary toilet with IS, chest PT, suctioning, acappella  -- encourage ambulation as tolerated. Out of bed, up to chair every morning as tolerated  -- Scheduled nebulization Levalbuterol/Ipatropium Q8H; PRN Q4H for wheezing and dsypnea  -- Hold oral feeds for now. Aspiration precautions, HOB > 60 degrees  -- PEG tube feeds, advance as tolerated; remove NG tube; RD consult for tube feeds recs  -- lasix seems to be helping him as he is much less SOB today. Will continue PO lasix 20 daily via G tube. Monitor UOP and electrolytes    Essential hypertension  -- poorly controlled; however in pm 120's systolic  -- On amlodipine, Coreg 12.5 mg, hydralazine 75 mg TID, HCTZ 25 mg,  Losartan 100 mg  -- HR in 70-80's    Recs:  -- BP goal < 140/80 mmHg in patient with hemorraghic CVA and infrarenal AAA  -- PEG tube feeds as tolerated.       VTE Risk Mitigation (From admission, onward)         Ordered     enoxaparin injection 40 mg  Daily      05/18/20 1306     Reason for No Pharmacological VTE Prophylaxis  Once     Question:  Reasons:  Answer:  Risk of Bleeding    05/14/20 1101     IP VTE HIGH RISK PATIENT  Once      05/14/20 1101     Place sequential compression device  Until discontinued      05/14/20 1101                      Olivia Valencia MD  Department of Hospital Medicine   [facility]                    05/23/2020                             STAFF PHYSICIAN NOTE                                   Attending Attestation for Rounds with Resident  I have reviewed and concur with the resident's history, physical, assessment, and plan.  I have personally interviewed and examined the patient at bedside and agree with the resident's findings.                                     Mitzi Ham MD  Senior Hospitalist  [id number], [phone]

## 2020-05-23 NOTE — ASSESSMENT & PLAN NOTE
-- incidental finding when obtaining CT Abdomen for PEG tube placement  -- No evidence of rupture per imaging scan, patient is HDS  -- Vascular Surgery Consult for repair-->rec'ed outpatient followup (see their note dated 5/22)

## 2020-05-23 NOTE — ASSESSMENT & PLAN NOTE
-- poorly controlled; however in pm 120's systolic  -- On amlodipine, Coreg 12.5 mg, hydralazine 75 mg TID, HCTZ 25 mg,  Losartan 100 mg  -- HR in 70-80's    Recs:  -- BP goal < 140/80 mmHg in patient with hemorraghic CVA and infrarenal AAA  -- PEG tube feeds as tolerated.

## 2020-05-23 NOTE — PLAN OF CARE
Problem: Adult Inpatient Plan of Care  Goal: Plan of Care Review  Outcome: Ongoing, Not Progressing     POC reviewed with patient this shift.  A/O x4.  Speech continues to be slurred.  Respirations unlabored.  O2 continues at 3L/NC.  No c/o SOB noted.  NGT remains intact.  Peg-tube remains intact.  Meds tolerated well through tube without difficulty.  [place] catheter remains intact with clear, maryam urine draining to gravity into urimeter without difficulty.  VSS.  Able to verbalize wants/needs.  No c/o pain or discomfort at this time.  WCTM.

## 2020-05-23 NOTE — CONSULTS
[facility]  Vascular Surgery  History and Physical     Inpatient consult to Vascular Surgery  Consult performed by: Liu Guzman MD  Consult ordered by: Hanna Calvo PA-C        Subjective:     Chief Complaint/Reason for Admission: Stroke    History of Present Illness: Brett Vega is a 75 y.o. male without significant past medical history who was admitted to [facility] [place] [place] on 05/14/2020 after suffering a large hemorrhagic stroke involving the caudate and thalamus.  His deficits include dysphagia, dysarthria, right-sided hemiparesis.  The patient required PEG tube placement secondary to dysphagia.  On the preprocedure CT scan, he was noted to have a large 7 x 6.7cm AAA.  Vascular surgery was consulted for evaluation.  The patient denies any knowledge of this aneurysm.  He denies any abdominal pain.    PMH: HTN  PSH: None  SH: denies tobacco  Antiplatelet/OAC: None    No medications prior to admission.       Review of patient's allergies indicates:  No Known Allergies    History reviewed. No pertinent past medical history.  History reviewed. No pertinent surgical history.  Tobacco Use    Smoking status: Former Smoker    Smokeless tobacco: Never Used   Substance and Sexual Activity    Alcohol use: Not on file    Drug use: Not on file    Sexual activity: Not on file     Review of Systems   Unable to perform ROS: Other     Objective:     Vital Signs (Most Recent):  Temp: 98.5 °F (36.9 °C) (05/22/20 1702)  Pulse: 81 (05/22/20 1933)  Resp: (!) 21 (05/22/20 1702)  BP: (!) 146/77 (05/22/20 1702)  SpO2: 95 % (05/22/20 1702) Vital Signs (24h Range):  Temp:  [97.6 °F (36.4 °C)-99 °F (37.2 °C)] 98.5 °F (36.9 °C)  Pulse:  [66-88] 81  Resp:  [16-27] 21  SpO2:  [93 %-98 %] 95 %  BP: (126-181)/() 146/77     Weight: 95.3 kg (210 lb)  Body mass index is 30.13 kg/m².    Date 05/22/20 0700 - 05/23/20 0659   Shift 6050-6531 0753-1856 7651-4185 24 Hour Total   INTAKE   NG/GT  30  30   Shift Total(mL/kg)   30(0.3)  30(0.3)   OUTPUT   Urine(mL/kg/hr) 550(0.7) 400  950   Shift Total(mL/kg) 550(5.8) 400(4.2)  950(10)   Weight (kg) 95.3 95.3 95.3 95.3       Physical Exam   Constitutional: He is oriented to person, place, and time. He appears well-developed and well-nourished.   Obese   HENT:   Head: Normocephalic and atraumatic.   NGT in place   Eyes: Pupils are equal, round, and reactive to light. EOM are normal.   Neck: Neck supple.   Cardiovascular: Normal rate and regular rhythm.   2+ femoral and distal pulses  Palpable popliteal pulses. No aneurysms   Pulmonary/Chest: Effort normal.   Abdominal: Soft. He exhibits no distension. There is no tenderness.   +Pulsatile abdominal mass   Neurological: He is alert and oriented to person, place, and time.   Right sided hemiparesis  Right facial droop  Dysarthria   Psychiatric: He has a normal mood and affect. His behavior is normal.   Nursing note and vitals reviewed.      Significant Labs:  ABGs: No results for input(s): PH, PCO2, PO2, HCO3, POCSATURATED, BE in the last 48 hours.  BMP:   Recent Labs   Lab 05/22/20  1042         K 3.7      CO2 26   BUN 34*   CREATININE 0.8   CALCIUM 9.1   MG 2.5     Cardiac markers: No results for input(s): CKMB, CPKMB, TROPONINT, TROPONINI, MYOGLOBIN in the last 48 hours.  CBC:   Recent Labs   Lab 05/22/20  1042   WBC 15.21*   RBC 4.89   HGB 14.1   HCT 46.6      MCV 95   MCH 28.8   MCHC 30.3*     CMP:   Recent Labs   Lab 05/22/20  1042      CALCIUM 9.1   ALBUMIN 3.5   PROT 7.1      K 3.7   CO2 26      BUN 34*   CREATININE 0.8   ALKPHOS 78   ALT 32   AST 30   BILITOT 0.5       Significant Diagnostics:  I have reviewed all pertinent imaging results/findings within the past 24 hours.    Assessment/Plan:     Brett Vega is a 75 y.o. male who suffered recent hemorrhagic stroke with significant deficits with large, incidentally found 7 x 6.7cm AAA     Discussed with the patient that given the large  size of his aneurysm (7 cm), we would normally offer elective repair.  Risk of rupture is approximately 20-30% per year.  However, given his acute stroke, we recommended repair in the future once he has recovered.  The patient can follow up in vascular clinic with Dr. Mcdowell as an outpatient to discuss surgical options once he is discharged from rehab.    Liu Guzman MD  Vascular Surgery  [phone]

## 2020-05-24 PROBLEM — E87.0 HYPERNATREMIA: Status: ACTIVE | Noted: 2020-05-24

## 2020-05-24 LAB
ANION GAP SERPL CALC-SCNC: 10 MMOL/L (ref 8–16)
ANION GAP SERPL CALC-SCNC: 9 MMOL/L (ref 8–16)
BASOPHILS # BLD AUTO: 0.01 K/UL (ref 0–0.2)
BASOPHILS NFR BLD: 0.1 % (ref 0–1.9)
BUN SERPL-MCNC: 37 MG/DL (ref 8–23)
BUN SERPL-MCNC: 38 MG/DL (ref 8–23)
CALCIUM SERPL-MCNC: 9.3 MG/DL (ref 8.7–10.5)
CALCIUM SERPL-MCNC: 9.5 MG/DL (ref 8.7–10.5)
CHLORIDE SERPL-SCNC: 107 MMOL/L (ref 95–110)
CHLORIDE SERPL-SCNC: 107 MMOL/L (ref 95–110)
CO2 SERPL-SCNC: 34 MMOL/L (ref 23–29)
CO2 SERPL-SCNC: 37 MMOL/L (ref 23–29)
CREAT SERPL-MCNC: 0.9 MG/DL (ref 0.5–1.4)
CREAT SERPL-MCNC: 1 MG/DL (ref 0.5–1.4)
DIFFERENTIAL METHOD: ABNORMAL
EOSINOPHIL # BLD AUTO: 0 K/UL (ref 0–0.5)
EOSINOPHIL NFR BLD: 0.2 % (ref 0–8)
ERYTHROCYTE [DISTWIDTH] IN BLOOD BY AUTOMATED COUNT: 13.4 % (ref 11.5–14.5)
EST. GFR  (AFRICAN AMERICAN): >60 ML/MIN/1.73 M^2
EST. GFR  (AFRICAN AMERICAN): >60 ML/MIN/1.73 M^2
EST. GFR  (NON AFRICAN AMERICAN): >60 ML/MIN/1.73 M^2
EST. GFR  (NON AFRICAN AMERICAN): >60 ML/MIN/1.73 M^2
GLUCOSE SERPL-MCNC: 133 MG/DL (ref 70–110)
GLUCOSE SERPL-MCNC: 141 MG/DL (ref 70–110)
HCT VFR BLD AUTO: 45.9 % (ref 40–54)
HGB BLD-MCNC: 13.9 G/DL (ref 14–18)
IMM GRANULOCYTES # BLD AUTO: 0.06 K/UL (ref 0–0.04)
IMM GRANULOCYTES NFR BLD AUTO: 0.5 % (ref 0–0.5)
LYMPHOCYTES # BLD AUTO: 1.1 K/UL (ref 1–4.8)
LYMPHOCYTES NFR BLD: 9.7 % (ref 18–48)
MAGNESIUM SERPL-MCNC: 2.6 MG/DL (ref 1.6–2.6)
MCH RBC QN AUTO: 28.7 PG (ref 27–31)
MCHC RBC AUTO-ENTMCNC: 30.3 G/DL (ref 32–36)
MCV RBC AUTO: 95 FL (ref 82–98)
MONOCYTES # BLD AUTO: 1.2 K/UL (ref 0.3–1)
MONOCYTES NFR BLD: 10.3 % (ref 4–15)
NEUTROPHILS # BLD AUTO: 9.1 K/UL (ref 1.8–7.7)
NEUTROPHILS NFR BLD: 79.2 % (ref 38–73)
NRBC BLD-RTO: 0 /100 WBC
OSMOLALITY UR: 871 MOSM/KG (ref 50–1200)
PHOSPHATE SERPL-MCNC: 3.5 MG/DL (ref 2.7–4.5)
PLATELET # BLD AUTO: 359 K/UL (ref 150–350)
PMV BLD AUTO: 10 FL (ref 9.2–12.9)
POTASSIUM SERPL-SCNC: 3.2 MMOL/L (ref 3.5–5.1)
POTASSIUM SERPL-SCNC: 3.5 MMOL/L (ref 3.5–5.1)
RBC # BLD AUTO: 4.84 M/UL (ref 4.6–6.2)
SODIUM SERPL-SCNC: 151 MMOL/L (ref 136–145)
SODIUM SERPL-SCNC: 153 MMOL/L (ref 136–145)
SODIUM UR-SCNC: 26 MMOL/L (ref 20–250)
WBC # BLD AUTO: 11.49 K/UL (ref 3.9–12.7)

## 2020-05-24 PROCEDURE — 80048 BASIC METABOLIC PNL TOTAL CA: CPT | Mod: 91

## 2020-05-24 PROCEDURE — 63600175 PHARM REV CODE 636 W HCPCS: Performed by: PSYCHIATRY & NEUROLOGY

## 2020-05-24 PROCEDURE — 83935 ASSAY OF URINE OSMOLALITY: CPT

## 2020-05-24 PROCEDURE — 25000003 PHARM REV CODE 250: Performed by: PHYSICIAN ASSISTANT

## 2020-05-24 PROCEDURE — 85025 COMPLETE CBC W/AUTO DIFF WBC: CPT

## 2020-05-24 PROCEDURE — 99900035 HC TECH TIME PER 15 MIN (STAT)

## 2020-05-24 PROCEDURE — 27000646 HC AEROBIKA DEVICE

## 2020-05-24 PROCEDURE — 84300 ASSAY OF URINE SODIUM: CPT

## 2020-05-24 PROCEDURE — 99233 PR SUBSEQUENT HOSPITAL CARE,LEVL III: ICD-10-PCS | Mod: ,,, | Performed by: HOSPITALIST

## 2020-05-24 PROCEDURE — 25000003 PHARM REV CODE 250: Performed by: PSYCHIATRY & NEUROLOGY

## 2020-05-24 PROCEDURE — 25000003 PHARM REV CODE 250: Performed by: STUDENT IN AN ORGANIZED HEALTH CARE EDUCATION/TRAINING PROGRAM

## 2020-05-24 PROCEDURE — 11000001 HC ACUTE MED/SURG PRIVATE ROOM

## 2020-05-24 PROCEDURE — 27000221 HC OXYGEN, UP TO 24 HOURS

## 2020-05-24 PROCEDURE — 80048 BASIC METABOLIC PNL TOTAL CA: CPT

## 2020-05-24 PROCEDURE — 25000003 PHARM REV CODE 250: Performed by: NURSE PRACTITIONER

## 2020-05-24 PROCEDURE — 94799 UNLISTED PULMONARY SVC/PX: CPT

## 2020-05-24 PROCEDURE — 25000242 PHARM REV CODE 250 ALT 637 W/ HCPCS: Performed by: STUDENT IN AN ORGANIZED HEALTH CARE EDUCATION/TRAINING PROGRAM

## 2020-05-24 PROCEDURE — 94640 AIRWAY INHALATION TREATMENT: CPT

## 2020-05-24 PROCEDURE — 99233 SBSQ HOSP IP/OBS HIGH 50: CPT | Mod: ,,, | Performed by: HOSPITALIST

## 2020-05-24 PROCEDURE — 94761 N-INVAS EAR/PLS OXIMETRY MLT: CPT

## 2020-05-24 PROCEDURE — 94664 DEMO&/EVAL PT USE INHALER: CPT

## 2020-05-24 PROCEDURE — 63700000 PHARM REV CODE 250 ALT 637 W/O HCPCS: Performed by: PHYSICIAN ASSISTANT

## 2020-05-24 PROCEDURE — 83735 ASSAY OF MAGNESIUM: CPT

## 2020-05-24 PROCEDURE — 36415 COLL VENOUS BLD VENIPUNCTURE: CPT

## 2020-05-24 PROCEDURE — 84100 ASSAY OF PHOSPHORUS: CPT

## 2020-05-24 RX ORDER — SODIUM CHLORIDE 9 MG/ML
INJECTION, SOLUTION INTRAVENOUS CONTINUOUS
Status: DISCONTINUED | OUTPATIENT
Start: 2020-05-24 | End: 2020-05-24

## 2020-05-24 RX ORDER — POTASSIUM CHLORIDE 20 MEQ/15ML
20 SOLUTION ORAL 3 TIMES DAILY
Status: COMPLETED | OUTPATIENT
Start: 2020-05-24 | End: 2020-05-24

## 2020-05-24 RX ADMIN — POTASSIUM CHLORIDE 20 MEQ: 20 SOLUTION ORAL at 08:05

## 2020-05-24 RX ADMIN — LOSARTAN POTASSIUM 100 MG: 50 TABLET ORAL at 08:05

## 2020-05-24 RX ADMIN — POTASSIUM CHLORIDE 20 MEQ: 20 SOLUTION ORAL at 02:05

## 2020-05-24 RX ADMIN — HYDRALAZINE HYDROCHLORIDE 75 MG: 50 TABLET, FILM COATED ORAL at 02:05

## 2020-05-24 RX ADMIN — CARVEDILOL 12.5 MG: 12.5 TABLET, FILM COATED ORAL at 08:05

## 2020-05-24 RX ADMIN — SILODOSIN 4 MG: 4 CAPSULE ORAL at 08:05

## 2020-05-24 RX ADMIN — LEVALBUTEROL HYDROCHLORIDE 0.63 MG: 0.63 SOLUTION RESPIRATORY (INHALATION) at 11:05

## 2020-05-24 RX ADMIN — HYDROCHLOROTHIAZIDE 25 MG: 25 TABLET ORAL at 08:05

## 2020-05-24 RX ADMIN — IPRATROPIUM BROMIDE 0.5 MG: 0.5 SOLUTION RESPIRATORY (INHALATION) at 07:05

## 2020-05-24 RX ADMIN — CARVEDILOL 12.5 MG: 12.5 TABLET, FILM COATED ORAL at 09:05

## 2020-05-24 RX ADMIN — HYDRALAZINE HYDROCHLORIDE 75 MG: 50 TABLET, FILM COATED ORAL at 05:05

## 2020-05-24 RX ADMIN — SODIUM CHLORIDE: 0.9 INJECTION, SOLUTION INTRAVENOUS at 08:05

## 2020-05-24 RX ADMIN — HYDRALAZINE HYDROCHLORIDE 75 MG: 50 TABLET, FILM COATED ORAL at 09:05

## 2020-05-24 RX ADMIN — IPRATROPIUM BROMIDE 0.5 MG: 0.5 SOLUTION RESPIRATORY (INHALATION) at 11:05

## 2020-05-24 RX ADMIN — LEVALBUTEROL HYDROCHLORIDE 0.63 MG: 0.63 SOLUTION RESPIRATORY (INHALATION) at 07:05

## 2020-05-24 RX ADMIN — AMLODIPINE BESYLATE 10 MG: 10 TABLET ORAL at 08:05

## 2020-05-24 RX ADMIN — POLYETHYLENE GLYCOL 3350 17 G: 17 POWDER, FOR SOLUTION ORAL at 08:05

## 2020-05-24 RX ADMIN — STANDARDIZED SENNA CONCENTRATE AND DOCUSATE SODIUM 1 TABLET: 8.6; 5 TABLET ORAL at 09:05

## 2020-05-24 RX ADMIN — STANDARDIZED SENNA CONCENTRATE AND DOCUSATE SODIUM 1 TABLET: 8.6; 5 TABLET ORAL at 08:05

## 2020-05-24 RX ADMIN — LEVALBUTEROL HYDROCHLORIDE 0.63 MG: 0.63 SOLUTION RESPIRATORY (INHALATION) at 03:05

## 2020-05-24 RX ADMIN — IPRATROPIUM BROMIDE 0.5 MG: 0.5 SOLUTION RESPIRATORY (INHALATION) at 03:05

## 2020-05-24 RX ADMIN — POTASSIUM CHLORIDE 20 MEQ: 20 SOLUTION ORAL at 09:05

## 2020-05-24 RX ADMIN — FUROSEMIDE 20 MG: 20 TABLET ORAL at 08:05

## 2020-05-24 RX ADMIN — ENOXAPARIN SODIUM 40 MG: 100 INJECTION SUBCUTANEOUS at 04:05

## 2020-05-24 NOTE — PROGRESS NOTES
[facility]  Vascular Neurology  Comprehensive Stroke Center  Progress Note    Assessment/Plan:     * Nontraumatic intracerebral hemorrhage  74 yo M with no known PMHx presents to [facility] 05/14/2020 transferred from OSH for large L thalamic and caudate ICH with intraventricular extension.  Of note, patient had SBP 240s documented at OSH.  Pt otherwise has no clear risk factors for ICH. No recent trauma.  No unexplained weight loss or B symptoms.  Admitted to Neuro ICU w/ NSGY following.  Etiology likely hypertensive, ruled out vascular malformation w/ further imaging. Repeat CT Head in 5/17: stable without evidence for significant interval detrimental change.        Antithrombotics for secondary stroke prevention:  None--ICH  Statins for secondary stroke prevention/HLD: LDL elevated--would not start statin acutely in setting of ICH, but could discuss low dose statin for reduction of stroke risk factors in stroke clinic follow up  Aggressive risk factor modification: HTN  Rehab efforts: PT/OT/SLP/PM&R, inpatient rehab   Diagnostics ordered/pending: none  VTE prophylaxis: None: Reason for No Pharmacological VTE Prophylaxis: History of systemic or intracranial bleeding  BP parameters: ICH: SBP <140    Dysphagia  NG tube in place  Patient failed MBSS, now NPO per SLP  PEG tube placed 5/22    Vasogenic cerebral edema  Area of vasogenic cerebral edema identified when reviewing brain imaging in the L basal ganglia. There is mass effect associated with it. We will continue to monitor the patients clinical exam for any worsening of symptoms which may indicate expansion of the hemorrhage or the area of the edema resulting in the clinical change. The ICH is likely 2/2 uncontrolled HTN.      Hypernatremia  Likely due to volume depletion   Urine studies and repeat BMP pending   Increasing enteral water boluses   Hospital medicine managing     AAA (abdominal aortic aneurysm) without rupture  Found incidentally on  CT abdomen ordered for PEG placement  7x6.7 cm  Vascular surgery consulted and no acute intervention warranted at this time  Needs follow up outpatient     Respiratory distress  Patient with increased work of breathing, tachypnea, and hypoxia.  Chest x-ray with pulmonary edema.  Patient afebrile with no leukocytosis  -IVF discontinued.   -Lasix 20 mg given with good output, but no sustained improvement noted  -BNP - 310  -Troponin mildly elevated but trending down - most likely ischemic demand  -EKG with no acute changes.  -COVID test negative  -Consult to IM - appreciate recommendations   -- pulmonary toilet with IS, chest PT, BID suction    -- Scheduled nebulization Levalbuterol/Ipatropium Q8H; PRN Q4H for wheezing and dsypnea   -- Hold tube feeds, and oral feeds for now. Aspiration precautions, HOB > 60 degrees, await barium study results   -- LE US negative for DVT   -- abd XR negative for ileus or constipation    -- repeating CXR, may need additional lasix dose   --CTA of chest negative for PE.  Bibasilar atelectasis   Patient breathing improving 05/24, but was found to be drinking mouthwash overnight.  Will continue to monitor for signs of aspiration      Dysarthria  -2/2 ICH, continue SLP eval and treat    Hemiparesis, right  -2/2 ICH, continue PT eval and treat  recommending discharge to inpatient rehab    Essential hypertension  -Stroke risk factor, likely etiology of ICH in this pt  - Med regiment adjusted:   Coreg 12.5mg BID   Norvasc 10mg Daily   Increased HCTZ to 25 mg Daily   - weaning hydralazine, switched from hydralazine 75 mg QID to TID           05/14/2020:  Transferred [place] [facility] for L ICH/IVH from caudate and thalamus  05/15/2020:   Continues on cardene for SBP < 140, neurosurgery following.  05/16/2020:   Started back on cardene for SBP < 140 this AM  05/18/2020:  Off Cardene drip, placed on Losartan and Hydralazine. Lovenox.  05/19/2010:  /180. Still with Rt side  weakness.   5/20: Stepped down overnight. Increased work of breathing, tachypnic.  IVF discontinued.  Crackles in bases and expiratory wheezes.  CXR with pulmonary edema.  Lasix given with good output but no sustained improvement of breathing.  Currently COVID rule out.  IM following. Blood pressure elevated - changed to carvedilol 12.5mg BID, Norvasc 10mg daily, with plans to start HCTZ tomorrow.  Continuing Hydralazine until BP stable and can change to PRN only. TF on hold due to respiratory status.  Scheduled for MBSS tomorrow if able to tolerate.  5/21 COVID test negative, tachypnea persisting, hospital medicine assisting, abdominal XR and US LE pending, additional IV lasix dose ordered, patient failed MBSS, will discuss PEG placement  5/22 patient had PEG placed this morning, weaning hydralazine and increasing HCTZ. Repeat CXR pending to f/u on pulmonary edema  05/23 Started tube feeds and enteral water boluses through PEG tube today   05/24 Hypernatremic overnight.  Likely due to volume depletion.  Hospital medicine increasing free water boluses and will get a BMP this afternoon.  Urine studies pending.  Nursing staff found patient drinking mouthwash.  Will continue to monitor for signs of aspiration.  Breathing improving     STROKE DOCUMENTATION   Acute Stroke Times   Last Known Normal Time: 0300  Symptom Onset Date: 05/14/20  Symptom Onset Time: 0700  Stroke Team Called Time: 0806  Stroke Team Arrival Time: 0809  CT Interpretation Time: 0809  Decision to Treat Time for Alteplase: (Contraindicated 2/2 ICH)  Decision to Treat Time for IR: (no LVO)    NIH Scale:  1a. Level of Consciousness: 0-->Alert, keenly responsive  1b. LOC Questions: 1-->Answers one question correctly  1c. LOC Commands: 0-->Performs both tasks correctly  2. Best Gaze: 0-->Normal  3. Visual: 0-->No visual loss  4. Facial Palsy: 1-->Minor paralysis (flattened nasolabial fold, asymmetry on smiling)  5a. Motor Arm, Left: 0-->No drift, limb  holds 90 (or 45) degrees for full 10 secs  5b. Motor Arm, Right: 3-->No effort against gravity, limb falls  6a. Motor Leg, Left: 0-->No drift, leg holds 30 degree position for full 5 secs  6b. Motor Leg, Right: 3-->No effort against gravity, leg falls to bed immediately  7. Limb Ataxia: 0-->Absent  8. Sensory: 0-->Normal, no sensory loss  9. Best Language: 0-->No aphasia, normal  10. Dysarthria: 1-->Mild-to-moderate dysarthria, patient slurs at least some words and, at worst, can be understood with some difficulty  11. Extinction and Inattention (formerly Neglect): 0-->No abnormality  Total (NIH Stroke Scale): 9       Modified Kina Score: 0  [place] Coma Scale:    ABCD2 Score:    [id number] Score:   HAS -BLED Score:   ICH Score:1  Hunt & Mccray Classification:      Hemorrhagic change of an Ischemic Stroke: Does this patient have an ischemic stroke with hemorrhagic changes? No     Neurologic Chief Complaint: Nontraumatic intracerebral hemorrhage    Subjective:     Interval History: Patient is seen for follow-up neurological assessment and treatment recommendations: Hypernatremic overnight.  Likely due to volume depletion.  Hospital medicine increasing free water boluses and will get a BMP this afternoon.  Urine studies pending.  Nursing staff found patient drinking mouthwash.  Will continue to monitor for signs of aspiration.  Breathing improving     HPI, Past Medical, Family, and Social History remains the same as documented in the initial encounter.     Review of Systems   Constitutional: Negative for chills and fever.   HENT: Positive for trouble swallowing.    Eyes: Negative for visual disturbance.   Respiratory: Negative for cough, chest tightness and shortness of breath.    Cardiovascular: Negative for chest pain.   Gastrointestinal: Negative for abdominal pain, constipation and vomiting.   Skin: Negative.    Neurological: Positive for facial asymmetry, speech difficulty and weakness.   Hematological:  Negative.    Psychiatric/Behavioral: Negative.      Scheduled Meds:   amLODIPine  10 mg Oral Daily    carvediloL  12.5 mg Oral BID    enoxaparin  40 mg Subcutaneous Daily    hydrALAZINE  75 mg Oral Q8H    hydroCHLOROthiazide  25 mg Oral Daily    ipratropium  0.5 mg Nebulization Q8H    levalbuterol  0.63 mg Nebulization Q8H    losartan  100 mg Oral Daily    polyethylene glycol  17 g Oral Daily    potassium chloride 10%  20 mEq Per G Tube TID    senna-docusate 8.6-50 mg  1 tablet Oral BID    silodosin  4 mg Oral Daily     Continuous Infusions:    PRN Meds:acetaminophen, albuterol-ipratropium, hydrALAZINE, labetaloL, sodium chloride 0.9%    Objective:     Vital Signs (Most Recent):  Temp: 98 °F (36.7 °C) (05/24/20 0720)  Pulse: 74 (05/24/20 0720)  Resp: 18 (05/24/20 0720)  BP: 124/74 (05/24/20 0720)  SpO2: 95 % (05/24/20 0720)  BP Location: Right arm    Vital Signs Range (Last 24H):  Temp:  [97.7 °F (36.5 °C)-98 °F (36.7 °C)]   Pulse:  [62-85]   Resp:  [18-27]   BP: (124-164)/(74-92)   SpO2:  [92 %-96 %]   BP Location: Right arm    Physical Exam   Constitutional: He is oriented to person, place, and time. He appears well-developed and well-nourished. No distress.   HENT:   Head: Normocephalic and atraumatic.   Cardiovascular: Normal rate.   Pulmonary/Chest: No tachypnea. No respiratory distress.   Abdominal: He exhibits distension. There is no tenderness.   Neurological: He is alert and oriented to person, place, and time.   Skin: Skin is warm and dry.   Vitals reviewed.      Neurological Exam:   LOC: alert  Attention Span: Good   Language: No aphasia  Articulation: Dysarthria  Orientation: Not oriented to time  Visual Fields: Full  EOM (CN III, IV, VI): Full/intact  Facial Movement (CN VII): asymmetric facial expression    Motor: Arm left  Normal 5/5  Leg left  Paresis: 4/5  Arm right  Paresis: 2/5  Leg right Paresis: 2/5  Sensation: Intact to light touch, temperature and vibration  Tone:normal      Laboratory:  CMP:   Recent Labs   Lab 05/24/20  0345   CALCIUM 9.5   *   K 3.2*   CO2 37*      BUN 37*   CREATININE 0.9     CBC:   Recent Labs   Lab 05/24/20  0345   WBC 11.49   RBC 4.84   HGB 13.9*   HCT 45.9   *   MCV 95   MCH 28.7   MCHC 30.3*     Lipid Panel:   No results for input(s): CHOL, LDLCALC, HDL, TRIG in the last 168 hours.  Hgb A1C:   No results for input(s): HGBA1C in the last 168 hours.    Diagnostic Results     Brain Imaging   CT head (05/17/20)   - Left basal ganglia parenchymal hemorrhage with intraventricular extension again noted, the overall appearance is stable without evidence for significant interval detrimental change.     CT Head (05/14/20 1107)  Redemonstration of an acute parenchymal hemorrhage centered within the left basal ganglia with intraventricular extension.  Persistent ventricular prominence, stable in size when compared to prior study, in keeping with evolving hydrocephalus.    No evidence of new hemorrhage    CT Head (05/14/20 1501)  Evolving acute parenchymal hemorrhage centered left basal ganglia with intraventricular extension.    Continued intraventricular hemorrhage with mild distention lateral and 3rd ventricles concerning for component of hydrocephalus.    No significant increased volume of hemorrhage.      CT head (05/14/20 0813):  Evidence of intraparenchymal hemorrhage centered in the region of the posterior limb of the left internal capsule with intraventricular extension as above.      Cardiac Imaging   TTE (05/15/20):  · Technically difficult portable study  · Normal left ventricular systolic function. The estimated ejection fraction is 55%.  · Normal right ventricular systolic function.  · Indeterminate left ventricular diastolic function.  · Normal central venous pressure (3 mmHg).           Alivia Bro PA-C  [facility] Stroke [facility]  Department of Vascular Neurology   [facility]

## 2020-05-24 NOTE — ASSESSMENT & PLAN NOTE
Patient with increased work of breathing, tachypnea, and hypoxia.  Chest x-ray with pulmonary edema.  Patient afebrile with no leukocytosis  -IVF discontinued.   -Lasix 20 mg given with good output, but no sustained improvement noted  -BNP - 310  -Troponin mildly elevated but trending down - most likely ischemic demand  -EKG with no acute changes.  -COVID test negative  -Consult to IM - appreciate recommendations   -- pulmonary toilet with IS, chest PT, BID suction    -- Scheduled nebulization Levalbuterol/Ipatropium Q8H; PRN Q4H for wheezing and dsypnea   -- Hold tube feeds, and oral feeds for now. Aspiration precautions, HOB > 60 degrees, await barium study results   -- LE US negative for DVT   -- abd XR negative for ileus or constipation    -- repeating CXR, may need additional lasix dose   --CTA of chest negative for PE.  Bibasilar atelectasis   Patient breathing improving 05/24, but was found to be drinking mouthwash overnight.  Will continue to monitor for signs of aspiration

## 2020-05-24 NOTE — PROGRESS NOTES
[facility] Hospital Medicine  Progress Note    Patient Name: Brett Vega  MRN: [id number]  Patient Class: IP- Inpatient   Admission Date: 5/14/2020  Length of Stay: 10 days  Attending Physician: Whitney Simon MD  Primary Care Provider: Elyse Parham MD    Subjective:     Principal Problem:Nontraumatic intracerebral hemorrhage    HPI:  Mr. Vega is a 74 yo male with no significant past medical history transferred from [facility] after he presented there for new onset of dense right hemiparesis and marked dysarthria. Onset unclear but likely around 3am on 05/14/20. No recent illness. Legally blind. BP on arrival to outside facility 247/129. Cardene ggt initiated. 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 Telemedicine-stroke done by Neurology and patient transferred to [facility] on 05/15/20 and admitted under [facility] service. Upon arrival, pt awake, alert, oriented, following commands. No movement against gravity on the right side. On Cardene ggt.      Hospital Course: 05/15/2020: SBP < 160, add hydralazine, PT/OT, evd watch  5/16/2020: increase Hydralazine, add losartan, start tube feeds, follow up CTH in AM, add silodosin and place cordova catheter, replace electrolytes PRN  5/17/2020: CT head stable, advance TF, add labetalol 100 q8hr, d/c cardene gtt  5/18/2020: required one prn dose of labetalol at 3 AM, scheduled hydralizine changed to q6 hrs, trial of puree consistency today although I do not feel patients swallowing is at point where can eat unattended or large quantities of food  5/19/20 Losartan increased to 100, hydralazine increased to 75. Resuming TF. Modified barium swallow test ordered. Nebs q6hr PRN. Transferred to Stroke Floor.   05/20/20: Patient on NG tube feeds, tachypnea and complaint of mild intermittent cough and associated dyspnea.     Hospital Medicine team consulted on 05/20/20  for respiratory distress.  Patient remains afebrile, heart rate wnl, no leukocyte count. tachypnea noted 22-28 rate with accessory muscle use. Chest congestion, gargling, coarse breath sounds and bibasilar wheezing on exam. CXR unremarkable for lobar consolidation, however reveals pulmonary congestive changes. no signs of volume over load on exam, Echo with no diastolic or systolic dysfunction. Primary team gave 20 IV lasix with UOP 2 L in 24 hours. COVID19 negative 05/20/20 05/21/20: Patient seen and examined at the bedside. Continues to be verbally responsive with mild dysarthria and right sided upper and lower extremity weakness. Oral and tube feeds on hold. Tachyapnea, with accessary muscles use but notes improvement in breathing compared to yesterday. Requiring 2.0 L nasal canula to maintain sats 95%. Scheduled for modified barium swallow study today.   05/22/20: No apparent concerns. Patient failed the swallowing study and today is s/p PEG tube placement per IR, patient tolerated the procedure well. Compared to yesterday, respiratory status is better. Remains NPO. RR 20-25. US LE doppler is negative for DVT. CXR unremarkable for ileus. CT abd w/ contrast consistent with infrarenal AAA 7 x 6.7 cm. Receiving scheduled neutralization. Lasix 20 mg given yesterday with UOP 2.225 L.   05/23/20: Pt states SOB is improved today. Vascular surgery recommending outpatient follow up for infrarenal AAA. Receiving scheduled nebs, patient encouraged to use IS. UOP 1.4 L yesterday. CTA negative for acute PE.     Interval History (05/24/20): Patient seen and examined at the bedside. Reports no concerns. PEG tube feeds ongoing. Afebrile. Patient RR 21-25, however, oxygen requirements decreased to 2.0 L. Lasix 20 mg er G tube,  cc/24 hours. Sodium in the am is 153, free water deficit 4.2 L. Patient has been getting 250 cc fluids via PEG tube every 6 hours, will increase to 500 cc every 6 hours and repeat BMP at 1800.      Review of Systems  Constitutional: Positive for activity change. Negative for appetite change, chills, fatigue, fever and unexpected weight change.   HENT: Negative for congestion, drooling, facial swelling and postnasal drip.    Eyes: Positive for visual disturbance. Negative for photophobia.   Respiratory:. Negative for wheezing or shortness of breath  Cardiovascular: Negative for chest pain, palpitations and leg swelling.   Gastrointestinal: Negative for abdominal distention, abdominal pain, anal bleeding, blood in stool, constipation, diarrhea and nausea.   Endocrine: Negative for polyphagia and polyuria.   Musculoskeletal: + Weakness upper and lower extremities. Negative for arthralgias, back pain, gait problem and joint swelling.   Skin: Negative for pallor and rash.   Neurological: Negative for dizziness, facial asymmetry, light-headedness and headaches.   Hematological: Negative for adenopathy. Does not bruise/bleed easily.     Objective:     Vital Signs (Most Recent):  Temp: 97.7 °F (36.5 °C) (05/24/20 0357)  Pulse: 73 (05/24/20 0700)  Resp: (!) 24 (05/24/20 0700)  BP: 132/77 (05/24/20 0700)  SpO2: 95 % (05/24/20 0700) Vital Signs (24h Range):  Temp:  [97.7 °F (36.5 °C)-98 °F (36.7 °C)] 97.7 °F (36.5 °C)  Pulse:  [62-85] 73  Resp:  [18-27] 24  SpO2:  [92 %-96 %] 95 %  BP: (127-164)/(75-92) 132/77     Weight: 95.3 kg (210 lb)  Body mass index is 30.13 kg/m².    Intake/Output Summary (Last 24 hours) at 5/24/2020 0714  Last data filed at 5/24/2020 0300  Gross per 24 hour   Intake 1050 ml   Output 1100 ml   Net -50 ml      Physical Exam  Constitutional:   NAD  HENT:   Mouth/Throat: Oropharynx is clear and moist. No oropharyngeal exudate.   Eyes: Conjunctivae are normal. No scleral icterus.   Neck: Normal range of motion. Neck supple. No JVD present. No tracheal deviation present. No thyromegaly present.   Cardiovascular: Normal rate, regular rhythm, normal heart sounds and intact distal pulses. Exam reveals  no gallop and no friction rub.   No murmur heard.  Pulmonary/Chest: No stridor.  He exhibits no tenderness.   Coarse breath sounds   Abdominal: Soft. Bowel sounds are normal. +  PEG tube and no mass. There is no tenderness. There is no guarding.   Musculoskeletal: He exhibits no edema, tenderness or deformity.   Lymphadenopathy:     He has no cervical adenopathy.   Neurological:   Right sided weakness; upper and lower extremity, Dysarthria   Skin: Skin is warm. Capillary refill takes less than 2 seconds. No erythema. No pallor.     Significant Labs:   CBC:   Recent Labs   Lab 05/22/20  1042 05/23/20  0445 05/24/20  0345   WBC 15.21* 11.61 11.49   HGB 14.1 13.4* 13.9*   HCT 46.6 43.2 45.9    291 359*     CMP:   Recent Labs   Lab 05/22/20  1042 05/23/20 0445 05/24/20  0345    151* 153*   K 3.7 3.9 3.2*    108 107   CO2 26 30* 37*    98 133*   BUN 34* 38* 37*   CREATININE 0.8 0.9 0.9   CALCIUM 9.1 9.5 9.5   PROT 7.1 6.5  --    ALBUMIN 3.5 3.1*  --    BILITOT 0.5 0.4  --    ALKPHOS 78 71  --    AST 30 28  --    ALT 32 29  --    ANIONGAP 11 13 9   EGFRNONAA >60.0 >60.0 >60.0     Significant Imaging: I have reviewed all pertinent imaging results/findings within the past 24 hours.    Assessment/Plan:     * Nontraumatic intracerebral hemorrhage  -- stable  -- PT/OT recommending inpatient rehab    Hypernatremia    -- sodium levels in am 153, free water deficit 4.2 L  -- patient has been getting 250 cc/Q6H free water flushes  -- Likely related to hypovolemia   Plan:  -- will increase free water flushes to 500 mL every 6 hours  -- Repeat BMP at 1800    AAA (abdominal aortic aneurysm) without rupture  -- incidental finding when obtaining CT Abdomen for PEG tube placement  -- No evidence of rupture per imaging scan, patient is HDS  -- Vascular Surgery Consult for repair-->rec'ed outpatient followup (see their note dated 5/22)    Respiratory distress  76 y/o male admitted for caudate and thalamic  hemorrhagic stroke initially in [facility] due to IV antihypertensives, stepped down to floor neurology team on 05/19/20.   -- tube feeds started on 05/19/20 at 10:32 am  -- patient remains afebrile, heart rate wnl, no leukocyte count  -- tachypnea noted 22-28 rate with accessory muscle use  -- Chest congestion, gargling, coarse breath sounds and bibasilar wheezing/crackles on exam  -- CXR unremarkable for lobar consolidation; has congestive changes  -- no signs of volume over load on exam, Echo with no diastolic or systolic dysfunction,   -- tested COVID19 negative on 05/20/20  -- had mild troponin elevation 0.4 - > 0.117 -> 0.113 likely type II demand ischemia in setting of hypertensive emergency  -- lasix naive, 20 mg IV given yesterday with UOP 2.0 L  -- lower extremity doppler ultrasound negative for DVT  -- abd XR negative for ileus, or constipation  -- CTA obtained 5/22 which was negative for PE  Assessment: Respiratory distress likely due to aspiration vs atelectasis   Plan:  -- pulmonary toilet with IS, chest PT, suctioning, acappella  -- encourage ambulation as tolerated. Out of bed, up to chair every morning as tolerated  -- Scheduled nebulization Levalbuterol/Ipatropium Q8H; PRN Q4H for wheezing and dsypnea  -- Hold oral feeds for now. Aspiration precautions, HOB > 60 degrees  -- PEG tube feeds resume with free water flushes Q6H (Increased to 500 cc Q6H at 9 am 05/24/20)    Essential hypertension  -- controlled  -- On amlodipine, Coreg 12.5 mg, hydralazine 75 mg TID, HCTZ 25 mg,  Losartan 100 mg  -- HR in 70-80's  Recs:  -- BP goal < 140/80 mmHg in patient with hemorraghic CVA and infrarenal AAA  -- PEG tube feeds as tolerated.     VTE Risk Mitigation (From admission, onward)         Ordered     enoxaparin injection 40 mg  Daily      05/18/20 1306     Reason for No Pharmacological VTE Prophylaxis  Once     Question:  Reasons:  Answer:  Risk of Bleeding    05/14/20 1101     IP VTE HIGH RISK PATIENT  Once       05/14/20 1101     Place sequential compression device  Until discontinued      05/14/20 1101              Thank you for your consult. Medicine Team will continue to follow. Please contact us if you have any additional questions.      Patient discussed and seen with Dr. Ham. Further reccs per the attending addendum.     Otto Alanis MD   Internal Medicine PGY 3  Department of Hospital Medicine   [facility]                     05/24/2020                             STAFF PHYSICIAN NOTE                                   Attending Attestation for Rounds with Resident  I have reviewed and concur with the resident's history, physical, assessment, and plan.  I have personally interviewed and examined the patient at bedside and agree with the resident's findings.                                     Mitzi Ham MD  Senior Hospitalist  [id number], [phone]

## 2020-05-24 NOTE — NURSING
"Upon initial rounding this nurse entered room find patient with mouthwash bottle in his hand.  When asked what he was doing pt states "See I can drink with no problem" then took a sip from mouthwash.  This nurse removed mouthwash and re-educated of NPO status.  No SOB/choking noted.  O2 sat maintained >95%.  No s/s of distress.  Appears that no more than approximately 30ml missing from bottle.  VSS.  All liquids/fluids moved out of reach of patient.  Pt states understanding.  Call placed to MD to notify.  Was advised to continue to monitor and notify if condition changes/worsens.  "

## 2020-05-24 NOTE — ASSESSMENT & PLAN NOTE
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  Of note, patient had SBP 240s documented at OSH.  Pt otherwise has no clear risk factors for ICH. No recent trauma.  No unexplained weight loss or B symptoms.  Admitted to Neuro ICU w/ NSGY following.  Etiology likely hypertensive, ruled out vascular malformation w/ further imaging. Repeat CT Head in 5/17: stable without evidence for significant interval detrimental change.        Antithrombotics for secondary stroke prevention:  None--ICH  Statins for secondary stroke prevention/HLD: LDL elevated--would not start statin acutely in setting of ICH, but could discuss low dose statin for reduction of stroke risk factors in stroke clinic follow up  Aggressive risk factor modification: HTN  Rehab efforts: PT/OT/SLP/PM&R, inpatient rehab   Diagnostics ordered/pending: none  VTE prophylaxis: None: Reason for No Pharmacological VTE Prophylaxis: History of systemic or intracranial bleeding  BP parameters: ICH: SBP <140

## 2020-05-24 NOTE — PLAN OF CARE
Problem: Adult Inpatient Plan of Care  Goal: Plan of Care Review  Outcome: Ongoing, Not Progressing     POC reviewed with patient this shift.  Continues to be A/O x4.  Respirations unlabored.  Skin w/d.  O2 weaned down to 2L/NC.  Tolerating well.  [place] cath remains intact with clear, maryam urine draining to gravity into urimeter.  Peg-tube intact with feeding infusing without difficulty with incremental rate increases until goal reached if possible this shift.  250 water boluses continue.  No residual noted with assessments.  Pt continues to refuse turning but does allow weight shift assistance.  VSS.  Able to verbalize wants/needs.  No c/o pain or discomfort at this time.

## 2020-05-24 NOTE — ASSESSMENT & PLAN NOTE
Likely due to volume depletion   Urine studies and repeat BMP pending   Increasing enteral water boluses   Hospital medicine managing

## 2020-05-24 NOTE — SUBJECTIVE & OBJECTIVE
Review of Systems  Objective:     Vital Signs (Most Recent):  Temp: 97.7 °F (36.5 °C) (05/24/20 0357)  Pulse: 73 (05/24/20 0700)  Resp: (!) 24 (05/24/20 0700)  BP: 132/77 (05/24/20 0700)  SpO2: 95 % (05/24/20 0700) Vital Signs (24h Range):  Temp:  [97.7 °F (36.5 °C)-98 °F (36.7 °C)] 97.7 °F (36.5 °C)  Pulse:  [62-85] 73  Resp:  [18-27] 24  SpO2:  [92 %-96 %] 95 %  BP: (127-164)/(75-92) 132/77     Weight: 95.3 kg (210 lb)  Body mass index is 30.13 kg/m².    Intake/Output Summary (Last 24 hours) at 5/24/2020 0714  Last data filed at 5/24/2020 0300  Gross per 24 hour   Intake 1050 ml   Output 1100 ml   Net -50 ml      Physical Exam    Significant Labs:   CBC:   Recent Labs   Lab 05/22/20  1042 05/23/20  0445 05/24/20  0345   WBC 15.21* 11.61 11.49   HGB 14.1 13.4* 13.9*   HCT 46.6 43.2 45.9    291 359*     CMP:   Recent Labs   Lab 05/22/20  1042 05/23/20  0445 05/24/20  0345    151* 153*   K 3.7 3.9 3.2*    108 107   CO2 26 30* 37*    98 133*   BUN 34* 38* 37*   CREATININE 0.8 0.9 0.9   CALCIUM 9.1 9.5 9.5   PROT 7.1 6.5  --    ALBUMIN 3.5 3.1*  --    BILITOT 0.5 0.4  --    ALKPHOS 78 71  --    AST 30 28  --    ALT 32 29  --    ANIONGAP 11 13 9   EGFRNONAA >60.0 >60.0 >60.0       Significant Imaging: I have reviewed all pertinent imaging results/findings within the past 24 hours.

## 2020-05-24 NOTE — SUBJECTIVE & OBJECTIVE
Neurologic Chief Complaint: Nontraumatic intracerebral hemorrhage    Subjective:     Interval History: Patient is seen for follow-up neurological assessment and treatment recommendations: Hypernatremic overnight.  Likely due to volume depletion.  Hospital medicine increasing free water boluses and will get a BMP this afternoon.  Urine studies pending.  Nursing staff found patient drinking mouthwash.  Will continue to monitor for signs of aspiration.  Breathing improving     HPI, Past Medical, Family, and Social History remains the same as documented in the initial encounter.     Review of Systems   Constitutional: Negative for chills and fever.   HENT: Positive for trouble swallowing.    Eyes: Negative for visual disturbance.   Respiratory: Negative for cough, chest tightness and shortness of breath.    Cardiovascular: Negative for chest pain.   Gastrointestinal: Negative for abdominal pain, constipation and vomiting.   Skin: Negative.    Neurological: Positive for facial asymmetry, speech difficulty and weakness.   Hematological: Negative.    Psychiatric/Behavioral: Negative.      Scheduled Meds:   amLODIPine  10 mg Oral Daily    carvediloL  12.5 mg Oral BID    enoxaparin  40 mg Subcutaneous Daily    hydrALAZINE  75 mg Oral Q8H    hydroCHLOROthiazide  25 mg Oral Daily    ipratropium  0.5 mg Nebulization Q8H    levalbuterol  0.63 mg Nebulization Q8H    losartan  100 mg Oral Daily    polyethylene glycol  17 g Oral Daily    potassium chloride 10%  20 mEq Per G Tube TID    senna-docusate 8.6-50 mg  1 tablet Oral BID    silodosin  4 mg Oral Daily     Continuous Infusions:    PRN Meds:acetaminophen, albuterol-ipratropium, hydrALAZINE, labetaloL, sodium chloride 0.9%    Objective:     Vital Signs (Most Recent):  Temp: 98 °F (36.7 °C) (05/24/20 0720)  Pulse: 74 (05/24/20 0720)  Resp: 18 (05/24/20 0720)  BP: 124/74 (05/24/20 0720)  SpO2: 95 % (05/24/20 0720)  BP Location: Right arm    Vital Signs Range (Last  24H):  Temp:  [97.7 °F (36.5 °C)-98 °F (36.7 °C)]   Pulse:  [62-85]   Resp:  [18-27]   BP: (124-164)/(74-92)   SpO2:  [92 %-96 %]   BP Location: Right arm    Physical Exam   Constitutional: He is oriented to person, place, and time. He appears well-developed and well-nourished. No distress.   HENT:   Head: Normocephalic and atraumatic.   Cardiovascular: Normal rate.   Pulmonary/Chest: No tachypnea. No respiratory distress.   Abdominal: He exhibits distension. There is no tenderness.   Neurological: He is alert and oriented to person, place, and time.   Skin: Skin is warm and dry.   Vitals reviewed.      Neurological Exam:   LOC: alert  Attention Span: Good   Language: No aphasia  Articulation: Dysarthria  Orientation: Not oriented to time  Visual Fields: Full  EOM (CN III, IV, VI): Full/intact  Facial Movement (CN VII): asymmetric facial expression    Motor: Arm left  Normal 5/5  Leg left  Paresis: 4/5  Arm right  Paresis: 2/5  Leg right Paresis: 2/5  Sensation: Intact to light touch, temperature and vibration  Tone:normal     Laboratory:  CMP:   Recent Labs   Lab 05/24/20  0345   CALCIUM 9.5   *   K 3.2*   CO2 37*      BUN 37*   CREATININE 0.9     CBC:   Recent Labs   Lab 05/24/20  0345   WBC 11.49   RBC 4.84   HGB 13.9*   HCT 45.9   *   MCV 95   MCH 28.7   MCHC 30.3*     Lipid Panel:   No results for input(s): CHOL, LDLCALC, HDL, TRIG in the last 168 hours.  Hgb A1C:   No results for input(s): HGBA1C in the last 168 hours.    Diagnostic Results     Brain Imaging   CT head (05/17/20)   - Left basal ganglia parenchymal hemorrhage with intraventricular extension again noted, the overall appearance is stable without evidence for significant interval detrimental change.     CT Head (05/14/20 2147)  Redemonstration of an acute parenchymal hemorrhage centered within the left basal ganglia with intraventricular extension.  Persistent ventricular prominence, stable in size when compared to prior study,  in keeping with evolving hydrocephalus.    No evidence of new hemorrhage    CT Head (05/14/20 1501)  Evolving acute parenchymal hemorrhage centered left basal ganglia with intraventricular extension.    Continued intraventricular hemorrhage with mild distention lateral and 3rd ventricles concerning for component of hydrocephalus.    No significant increased volume of hemorrhage.      CT head (05/14/20 0813):  Evidence of intraparenchymal hemorrhage centered in the region of the posterior limb of the left internal capsule with intraventricular extension as above.      Cardiac Imaging   TTE (05/15/20):  · Technically difficult portable study  · Normal left ventricular systolic function. The estimated ejection fraction is 55%.  · Normal right ventricular systolic function.  · Indeterminate left ventricular diastolic function.  · Normal central venous pressure (3 mmHg).

## 2020-05-25 LAB
ANION GAP SERPL CALC-SCNC: 4 MMOL/L (ref 8–16)
ANION GAP SERPL CALC-SCNC: 7 MMOL/L (ref 8–16)
BASOPHILS # BLD AUTO: 0.02 K/UL (ref 0–0.2)
BASOPHILS NFR BLD: 0.2 % (ref 0–1.9)
BUN SERPL-MCNC: 35 MG/DL (ref 8–23)
BUN SERPL-MCNC: 35 MG/DL (ref 8–23)
CALCIUM SERPL-MCNC: 8.9 MG/DL (ref 8.7–10.5)
CALCIUM SERPL-MCNC: 9.2 MG/DL (ref 8.7–10.5)
CHLORIDE SERPL-SCNC: 104 MMOL/L (ref 95–110)
CHLORIDE SERPL-SCNC: 108 MMOL/L (ref 95–110)
CO2 SERPL-SCNC: 35 MMOL/L (ref 23–29)
CO2 SERPL-SCNC: 36 MMOL/L (ref 23–29)
CREAT SERPL-MCNC: 0.8 MG/DL (ref 0.5–1.4)
CREAT SERPL-MCNC: 0.9 MG/DL (ref 0.5–1.4)
DIFFERENTIAL METHOD: ABNORMAL
EOSINOPHIL # BLD AUTO: 0 K/UL (ref 0–0.5)
EOSINOPHIL NFR BLD: 0.4 % (ref 0–8)
ERYTHROCYTE [DISTWIDTH] IN BLOOD BY AUTOMATED COUNT: 13.5 % (ref 11.5–14.5)
EST. GFR  (AFRICAN AMERICAN): >60 ML/MIN/1.73 M^2
EST. GFR  (AFRICAN AMERICAN): >60 ML/MIN/1.73 M^2
EST. GFR  (NON AFRICAN AMERICAN): >60 ML/MIN/1.73 M^2
EST. GFR  (NON AFRICAN AMERICAN): >60 ML/MIN/1.73 M^2
GLUCOSE SERPL-MCNC: 147 MG/DL (ref 70–110)
GLUCOSE SERPL-MCNC: 149 MG/DL (ref 70–110)
HCT VFR BLD AUTO: 43.9 % (ref 40–54)
HGB BLD-MCNC: 13.2 G/DL (ref 14–18)
IMM GRANULOCYTES # BLD AUTO: 0.06 K/UL (ref 0–0.04)
IMM GRANULOCYTES NFR BLD AUTO: 0.5 % (ref 0–0.5)
LYMPHOCYTES # BLD AUTO: 1 K/UL (ref 1–4.8)
LYMPHOCYTES NFR BLD: 9 % (ref 18–48)
MAGNESIUM SERPL-MCNC: 2.3 MG/DL (ref 1.6–2.6)
MCH RBC QN AUTO: 28.9 PG (ref 27–31)
MCHC RBC AUTO-ENTMCNC: 30.1 G/DL (ref 32–36)
MCV RBC AUTO: 96 FL (ref 82–98)
MONOCYTES # BLD AUTO: 1 K/UL (ref 0.3–1)
MONOCYTES NFR BLD: 9.1 % (ref 4–15)
NEUTROPHILS # BLD AUTO: 8.8 K/UL (ref 1.8–7.7)
NEUTROPHILS NFR BLD: 80.8 % (ref 38–73)
NRBC BLD-RTO: 0 /100 WBC
PHOSPHATE SERPL-MCNC: 3.6 MG/DL (ref 2.7–4.5)
PLATELET # BLD AUTO: 293 K/UL (ref 150–350)
PMV BLD AUTO: 10.3 FL (ref 9.2–12.9)
POTASSIUM SERPL-SCNC: 3.5 MMOL/L (ref 3.5–5.1)
POTASSIUM SERPL-SCNC: 3.6 MMOL/L (ref 3.5–5.1)
RBC # BLD AUTO: 4.56 M/UL (ref 4.6–6.2)
SODIUM SERPL-SCNC: 144 MMOL/L (ref 136–145)
SODIUM SERPL-SCNC: 150 MMOL/L (ref 136–145)
WBC # BLD AUTO: 10.93 K/UL (ref 3.9–12.7)

## 2020-05-25 PROCEDURE — 80048 BASIC METABOLIC PNL TOTAL CA: CPT

## 2020-05-25 PROCEDURE — 25000003 PHARM REV CODE 250: Performed by: NURSE PRACTITIONER

## 2020-05-25 PROCEDURE — 99233 SBSQ HOSP IP/OBS HIGH 50: CPT | Mod: GC,,, | Performed by: PSYCHIATRY & NEUROLOGY

## 2020-05-25 PROCEDURE — 27000646 HC AEROBIKA DEVICE

## 2020-05-25 PROCEDURE — 94761 N-INVAS EAR/PLS OXIMETRY MLT: CPT

## 2020-05-25 PROCEDURE — 85025 COMPLETE CBC W/AUTO DIFF WBC: CPT

## 2020-05-25 PROCEDURE — 94668 MNPJ CHEST WALL SBSQ: CPT

## 2020-05-25 PROCEDURE — 63600175 PHARM REV CODE 636 W HCPCS: Performed by: PSYCHIATRY & NEUROLOGY

## 2020-05-25 PROCEDURE — 99900035 HC TECH TIME PER 15 MIN (STAT)

## 2020-05-25 PROCEDURE — 11000001 HC ACUTE MED/SURG PRIVATE ROOM

## 2020-05-25 PROCEDURE — 97110 THERAPEUTIC EXERCISES: CPT

## 2020-05-25 PROCEDURE — 99233 PR SUBSEQUENT HOSPITAL CARE,LEVL III: ICD-10-PCS | Mod: GC,,, | Performed by: PSYCHIATRY & NEUROLOGY

## 2020-05-25 PROCEDURE — 25000242 PHARM REV CODE 250 ALT 637 W/ HCPCS: Performed by: STUDENT IN AN ORGANIZED HEALTH CARE EDUCATION/TRAINING PROGRAM

## 2020-05-25 PROCEDURE — 25000003 PHARM REV CODE 250: Performed by: PHYSICIAN ASSISTANT

## 2020-05-25 PROCEDURE — 99233 SBSQ HOSP IP/OBS HIGH 50: CPT | Mod: ,,, | Performed by: HOSPITALIST

## 2020-05-25 PROCEDURE — 25000003 PHARM REV CODE 250: Performed by: PSYCHIATRY & NEUROLOGY

## 2020-05-25 PROCEDURE — 92526 ORAL FUNCTION THERAPY: CPT

## 2020-05-25 PROCEDURE — 84100 ASSAY OF PHOSPHORUS: CPT

## 2020-05-25 PROCEDURE — 27000221 HC OXYGEN, UP TO 24 HOURS

## 2020-05-25 PROCEDURE — 80048 BASIC METABOLIC PNL TOTAL CA: CPT | Mod: 91

## 2020-05-25 PROCEDURE — 94664 DEMO&/EVAL PT USE INHALER: CPT

## 2020-05-25 PROCEDURE — 97535 SELF CARE MNGMENT TRAINING: CPT

## 2020-05-25 PROCEDURE — 83735 ASSAY OF MAGNESIUM: CPT

## 2020-05-25 PROCEDURE — 97530 THERAPEUTIC ACTIVITIES: CPT

## 2020-05-25 PROCEDURE — 94640 AIRWAY INHALATION TREATMENT: CPT

## 2020-05-25 PROCEDURE — 99233 PR SUBSEQUENT HOSPITAL CARE,LEVL III: ICD-10-PCS | Mod: ,,, | Performed by: HOSPITALIST

## 2020-05-25 PROCEDURE — 36415 COLL VENOUS BLD VENIPUNCTURE: CPT

## 2020-05-25 RX ORDER — LOSARTAN POTASSIUM 50 MG/1
100 TABLET ORAL DAILY
Status: DISCONTINUED | OUTPATIENT
Start: 2020-05-26 | End: 2020-05-26 | Stop reason: HOSPADM

## 2020-05-25 RX ORDER — HYDRALAZINE HYDROCHLORIDE 50 MG/1
50 TABLET, FILM COATED ORAL EVERY 8 HOURS
Status: DISCONTINUED | OUTPATIENT
Start: 2020-05-25 | End: 2020-05-25

## 2020-05-25 RX ORDER — LOSARTAN POTASSIUM 50 MG/1
50 TABLET ORAL DAILY
Status: DISCONTINUED | OUTPATIENT
Start: 2020-05-26 | End: 2020-05-25

## 2020-05-25 RX ADMIN — LEVALBUTEROL HYDROCHLORIDE 0.63 MG: 0.63 SOLUTION RESPIRATORY (INHALATION) at 08:05

## 2020-05-25 RX ADMIN — ENOXAPARIN SODIUM 40 MG: 100 INJECTION SUBCUTANEOUS at 04:05

## 2020-05-25 RX ADMIN — CARVEDILOL 12.5 MG: 12.5 TABLET, FILM COATED ORAL at 08:05

## 2020-05-25 RX ADMIN — AMLODIPINE BESYLATE 10 MG: 10 TABLET ORAL at 08:05

## 2020-05-25 RX ADMIN — IPRATROPIUM BROMIDE 0.5 MG: 0.5 SOLUTION RESPIRATORY (INHALATION) at 09:05

## 2020-05-25 RX ADMIN — HYDRALAZINE HYDROCHLORIDE 75 MG: 50 TABLET, FILM COATED ORAL at 05:05

## 2020-05-25 RX ADMIN — LOSARTAN POTASSIUM 100 MG: 50 TABLET ORAL at 08:05

## 2020-05-25 RX ADMIN — STANDARDIZED SENNA CONCENTRATE AND DOCUSATE SODIUM 1 TABLET: 8.6; 5 TABLET ORAL at 08:05

## 2020-05-25 RX ADMIN — HYDROCHLOROTHIAZIDE 25 MG: 25 TABLET ORAL at 08:05

## 2020-05-25 RX ADMIN — SILODOSIN 4 MG: 4 CAPSULE ORAL at 08:05

## 2020-05-25 RX ADMIN — POLYETHYLENE GLYCOL 3350 17 G: 17 POWDER, FOR SOLUTION ORAL at 08:05

## 2020-05-25 NOTE — ASSESSMENT & PLAN NOTE
-- controlled  -- On amlodipine, Coreg 12.5 mg, hydralazine 75 mg TID, HCTZ 25 mg,  Losartan 100 mg  -- HR in 70-80's  Recs:  -- BP goal < 140/80 mmHg in patient with hemorraghic CVA and infrarenal AAA  -- would recommend BP meds to be titrated, consider changing hydralazine to 25 mg TID and weaning in case BP remains at goal.

## 2020-05-25 NOTE — PLAN OF CARE
Goals remain appropriate. DELLA Evans 5/25/2020   Problem: Occupational Therapy Goal  Goal: Occupational Therapy Goal  Description  Goals to be met by: 5/29/2020     Patient will increase functional independence with ADLs by performing:    Grooming while standing with Moderate Assistance.  LB dressing with moderate assistance.  Toileting from bedside commode with Moderate Assistance for hygiene and clothing management.   Sitting at edge of bed x10 minutes with Minimal Assistance in preparation of functional seated grooming tasks. MET  *revised: with CGA x10 min and midline orientation maintained  Stand pivot transfers with Moderate Assistance. Met   *revised with min(A)  Toilet transfer to bedside commode with Moderate Assistance.       Outcome: Ongoing, Progressing

## 2020-05-25 NOTE — CARE UPDATE
Following changes made in plan of care after revieweing vitals and labs at 1600 on 05/25/20    1) Sodium is now 144 (1600) improved from 150 in am with 1.5 L free water flushes (750 cc at 8 am and 750 cc at 2 pm). Goal sodium is 136. However, will decrease free water boluses to 350 cc every Q6 hours starting 6 pm (1800) on 05/25/20 with mean goal 1.4 L (350 cc Q 6H) daily free water.    2) Given BP is 110-110 systolic, discontinued hydralazine and amlodipine. Consider decreasing Losartan dose if patient continues to remain in low hypotensive range. Goal BP < 140/80 mmHg.     Otto Alanis MD  Internal Medicine PGY 3  Department of Hospital Medicine   [facility]

## 2020-05-25 NOTE — ASSESSMENT & PLAN NOTE
-Stroke risk factor, likely etiology of ICH in this pt  - Med regiment adjusted:   Coreg 12.5mg BID   Norvasc 10mg Daily   HCTZ to 25 mg Daily   - weaning hydralazine, switched from hydralazine 75 mg QID to TID on 5/24, will consider additional wean

## 2020-05-25 NOTE — SUBJECTIVE & OBJECTIVE
Neurologic Chief Complaint: L thalamic/caudate ICH with IVH    Subjective:     Interval History: Patient is seen for follow-up neurological assessment and treatment recommendations: JUDYEON. Na 150 this AM, free water increased by HM to 500q6h. Maintaining sats on 2L NC. Last BM 5/24. Pending IPR.     HPI, Past Medical, Family, and Social History remains the same as documented in the initial encounter.     Review of Systems   Constitutional: Negative for chills and fever.   HENT: Positive for trouble swallowing.    Eyes: Negative for visual disturbance.   Respiratory: Negative for cough, chest tightness and shortness of breath.    Cardiovascular: Negative for chest pain.   Gastrointestinal: Negative for abdominal pain, constipation and vomiting.   Skin: Negative.    Neurological: Positive for facial asymmetry, speech difficulty and weakness.   Hematological: Negative.    Psychiatric/Behavioral: Negative.    Scheduled Meds:   amLODIPine  10 mg Oral Daily    carvediloL  12.5 mg Oral BID    enoxaparin  40 mg Subcutaneous Daily    hydrALAZINE  75 mg Oral Q8H    hydroCHLOROthiazide  25 mg Oral Daily    ipratropium  0.5 mg Nebulization Q8H    levalbuterol  0.63 mg Nebulization Q8H    losartan  100 mg Oral Daily    polyethylene glycol  17 g Oral Daily    senna-docusate 8.6-50 mg  1 tablet Oral BID    silodosin  4 mg Oral Daily     Continuous Infusions:  PRN Meds:acetaminophen, albuterol-ipratropium, hydrALAZINE, labetaloL, sodium chloride 0.9%    Objective:     Vital Signs (Most Recent):  Temp: 97.4 °F (36.3 °C) (05/25/20 0459)  Pulse: 69 (05/25/20 0459)  Resp: (!) 25 (05/25/20 0459)  BP: 111/77 (05/25/20 0459)  SpO2: 95 % (05/25/20 0459)  BP Location: Right arm    Vital Signs Range (Last 24H):  Temp:  [97.1 °F (36.2 °C)-97.7 °F (36.5 °C)]   Pulse:  [68-79]   Resp:  [18-28]   BP: (111-129)/(68-85)   SpO2:  [91 %-96 %]   BP Location: Right arm    Physical Exam   Constitutional: He is oriented to person, place, and  time. He appears well-developed and well-nourished. No distress.   HENT:   Head: Normocephalic and atraumatic.   Cardiovascular: Normal rate.   Pulmonary/Chest: No tachypnea. No respiratory distress.   Abdominal: There is no tenderness.   Neurological: He is alert and oriented to person, place, and time.   Skin: Skin is warm and dry.   Vitals reviewed.     Neurological Exam:   LOC: alert  Attention Span: Good   Language: No aphasia  Articulation: Dysarthria  Orientation: Not oriented to time  Visual Fields: Full  EOM (CN III, IV, VI): Full/intact  Facial Movement (CN VII): asymmetric facial expression    Motor: Arm left  Normal 5/5  Leg left  Paresis: 4/5  Arm right  Paresis: 2/5, some movement R bicep w/gravity eliminated  Leg right Paresis: 2/5  Sensation: Intact to light touch, temperature and vibration  Tone:normal     Laboratory:  CMP:   Recent Labs   Lab 05/25/20  0514   CALCIUM 9.2   *   K 3.6   CO2 35*      BUN 35*   CREATININE 0.9     CBC:   Recent Labs   Lab 05/25/20  0514   WBC 10.93   RBC 4.56*   HGB 13.2*   HCT 43.9      MCV 96   MCH 28.9   MCHC 30.1*       Diagnostic Results     Brain Imaging   CT head (05/17/20)   - Left basal ganglia parenchymal hemorrhage with intraventricular extension again noted, the overall appearance is stable without evidence for significant interval detrimental change.     CT Head (05/14/20 0215)  Redemonstration of an acute parenchymal hemorrhage centered within the left basal ganglia with intraventricular extension.  Persistent ventricular prominence, stable in size when compared to prior study, in keeping with evolving hydrocephalus.    No evidence of new hemorrhage     CT Head (05/14/20 1501)  Evolving acute parenchymal hemorrhage centered left basal ganglia with intraventricular extension.    Continued intraventricular hemorrhage with mild distention lateral and 3rd ventricles concerning for component of hydrocephalus.    No significant increased  volume of hemorrhage.       CT head (05/14/20 0813):  Evidence of intraparenchymal hemorrhage centered in the region of the posterior limb of the left internal capsule with intraventricular extension as above.       Cardiac Imaging   TTE (05/15/20):  · Technically difficult portable study  · Normal left ventricular systolic function. The estimated ejection fraction is 55%.  · Normal right ventricular systolic function.  · Indeterminate left ventricular diastolic function.  · Normal central venous pressure (3 mmHg).

## 2020-05-25 NOTE — PLAN OF CARE
Interval History (05/25/20): Patient seen and examined at the bedside. No apparent concerns. Respiratory status improved. Wean off oxygen as tolerated. RR 17-20 per minute. No lasix given yesterday. UOP 1.45 L/24 hours. PEG tube feeds ongoing at goal 60 mL/hr with free water flushes increased to 750 cc every 6 hours. Patient had a bowel movement yesterday per the nursing staff.      05/25/20 1116   Discharge Reassessment   Assessment Type Discharge Planning Reassessment   Provided patient/caregiver education on the expected discharge date and the discharge plan No   Do you have any problems affording any of your prescribed medications? No   Discharge Plan A Rehab   Discharge Plan B Rehab   DME Needed Upon Discharge  other (see comments)  (tbd)   Patient choice form signed by patient/caregiver N/A   Anticipated Discharge Disposition Rehab   Can the patient/caregiver answer the patient profile reliably? Yes, cognitively intact   How does the patient rate their overall health at the present time? Fair   Describe the patient's ability to walk at the present time. Walks with the help of equipment   How often would a person be available to care for the patient? Occasionally   Post-Acute Status   Post-Acute Authorization Placement   Post-Acute Placement Status Awaiting Internal Medical Clearance   Discharge Delays None known at this time     Nina Ivy RN  Case Management  Ext: [phone]  05/25/2020  11:17 AM

## 2020-05-25 NOTE — PLAN OF CARE
Problem: Physical Therapy Goal  Goal: Physical Therapy Goal  Description  PT goals until 5/31/20    1. Pt supine to sit with minimal assist-not met  2. Pt sit to supine with minimal assist-not met  3. Pt sit to stand with hemiwalker with minimal assist-not met  4. Pt to perform gait 5ft with hemiwalker with max assist.-not met  5. Pt to transfer bed to/from bedside chair with hemiwalker with moderate assist.-not met  6. Pt to perform B LE exs in sitting or supine x 15 reps to strengthen B LE to improve functional mobility.-not met  7. Pt to sit on the EOB ~ 10 min with CGA to allow pt to perform functional activities-not met     Outcome: Ongoing, Progressing   Pt's goals remain appropriate and pt will continue to benefit from skilled PT services to work towards improved functional mobility including: bed mobility, transfers, and gait.   Marissa Guajardo, PT  5/25/2020

## 2020-05-25 NOTE — ASSESSMENT & PLAN NOTE
Patient with increased work of breathing, tachypnea, and hypoxia.  Chest x-ray with pulmonary edema.  Patient afebrile with no leukocytosis  -IVF discontinued.   -Lasix 20 mg given with good output, but no sustained improvement noted  -BNP - 310  -Troponin mildly elevated but trending down - most likely ischemic demand  -EKG with no acute changes.  -COVID test negative  -Consult to IM - appreciate recommendations   -- pulmonary toilet with IS, chest PT, BID suction    -- Scheduled nebulization Levalbuterol/Ipatropium Q8H; PRN Q4H for wheezing and dsypnea   -- NPO. Aspiration precautions, HOB > 60 degrees   -- LE US negative for DVT   -- abd XR negative for ileus or constipation    --CTA of chest negative for PE.  Bibasilar atelectasis   Patient breathing improving 05/24, but was found to be drinking mouthwash overnight.  Will continue to monitor for signs of aspiration

## 2020-05-25 NOTE — ASSESSMENT & PLAN NOTE
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  Of note, patient had SBP 240s documented at OSH.  Pt otherwise has no clear risk factors for ICH. No recent trauma.  No unexplained weight loss or B symptoms.  Admitted to Neuro ICU w/ NSGY following.  Etiology likely hypertensive, ruled out vascular malformation w/ further imaging. Repeat CT Head in 5/17: stable without evidence for significant interval detrimental change.    Antithrombotics for secondary stroke prevention:  None--ICH  Statins for secondary stroke prevention/HLD: LDL elevated--would not start statin acutely in setting of ICH, but could discuss low dose statin for reduction of stroke risk factors in stroke clinic follow up  Aggressive risk factor modification: HTN  Rehab efforts: PT/OT/SLP/PM&R, inpatient rehab   Diagnostics ordered/pending: none  VTE prophylaxis: None: Reason for No Pharmacological VTE Prophylaxis: History of systemic or intracranial bleeding  BP parameters: ICH: SBP <140

## 2020-05-25 NOTE — ASSESSMENT & PLAN NOTE
Likely due to volume depletion, Na 150 this AM.   Increasing enteral water boluses, increased from 500 to 750 q6h on 5/25  Hospital medicine managing , appreciate recs

## 2020-05-25 NOTE — PT/OT/SLP PROGRESS
Occupational Therapy   Treatment    Name: Brett Vega  MRN: [id number]  Admitting Diagnosis:  Nontraumatic intracerebral hemorrhage       Recommendations:     Discharge Recommendations: rehabilitation facility  Discharge Equipment Recommendations:  (TBD with progression in care)  Barriers to discharge:  (level of skilled assistance required)    Assessment:     Brett Vega is a 75 y.o. male with a medical diagnosis of Nontraumatic intracerebral hemorrhage.  He presents with R hemiparesis and dysarthria. He demo gains towards OT goals and would best benefit from inpatient rehab at this time. Performance deficits affecting function are weakness, impaired endurance, impaired self care skills, impaired functional mobilty, gait instability, impaired balance, decreased upper extremity function, decreased lower extremity function, visual deficits, impaired coordination, decreased ROM, decreased safety awareness, impaired cognition.     Rehab Prognosis:  Good; patient would benefit from acute skilled OT services to address these deficits and reach maximum level of function.       Plan:     Patient to be seen 4 x/week to address the above listed problems via self-care/home management, therapeutic activities, therapeutic exercises, neuromuscular re-education, cognitive retraining  · Plan of Care Expires: 06/13/20  · Plan of Care Reviewed with: patient    Subjective     Pain/Comfort:  · Pain Rating 1: 0/10  · Pain Rating Post-Intervention 1: 0/10    Objective:     Communicated with: RN prior to session.  Patient found HOB elevated with Condom Catheter, PEG Tube, telemetry, bed alarm, peripheral IV, pulse ox (continuous) upon OT entry to room.    General Precautions: Standard, aspiration, fall, vision impaired, NPO   Orthopedic Precautions:N/A     Occupational Performance:     Bed Mobility:    · Patient completed Rolling/Turning to Left with  moderate assistance  · Patient completed Supine to Sit with moderate assistance      Functional Mobility/Transfers:  · Patient completed Sit <> Stand Transfer with maximal assistance  with  no assistive device   · OT to facilitate R UE handling and positioning and weight bearing with stands  · Patient completed Bed <> Chair Transfer using Step Transfer technique with maximal assistance with no assistive device    Activities of Daily Living:  · Grooming: minimum assistance and setup; oral care while seated in chair. requirnig safety assist for NPO precautions  · Upper Body Dressing: maximal assistance rashmi dressing for gown as jacket while seated EOB  · Lower Body Dressing: maximal assistance B socks EOB    [facility] 6 Click ADL: 12    Treatment & Education:  -Pt alert and oriented to person, place and month-- requiring cues for time and overall situation; OT to turn on lights and open shade for session  -EOB with CGA-min(A) for postural control; R UE facilitation for weight bearing with functional task  *verbal cues for placement throughout; verbal cues for midline orientation with EOB activity and even weight bearing throughout R side  -up to chair ; R UE in elevation and extension with abduction and open palm; edu on importance of positioning   -assist pt to dial and call spouse at cessation of session   -Communication board updated; questions/concerns addressed within OT scope of practice      Patient left up in chair with all lines intact, call button in reach, chair alarm on and RN notifiedEducation:      GOALS:   Multidisciplinary Problems     Occupational Therapy Goals        Problem: Occupational Therapy Goal    Goal Priority Disciplines Outcome Interventions   Occupational Therapy Goal     OT, PT/OT Ongoing, Progressing    Description:  Goals to be met by: 5/29/2020     Patient will increase functional independence with ADLs by performing:    Grooming while standing with Moderate Assistance.  LB dressing with moderate assistance.  Toileting from bedside commode with Moderate Assistance for  hygiene and clothing management.   Sitting at edge of bed x10 minutes with Minimal Assistance in preparation of functional seated grooming tasks. MET  *revised: with CGA x10 min and midline orientation maintained  Stand pivot transfers with Moderate Assistance. Met   *revised with min(A)  Toilet transfer to bedside commode with Moderate Assistance.                        Time Tracking:     OT Date of Treatment: 05/25/20  OT Start Time: 1120  OT Stop Time: 1149  OT Total Time (min): 29 min    Billable Minutes:Self Care/Home Management 25    DELLA Evans  5/25/2020

## 2020-05-25 NOTE — SUBJECTIVE & OBJECTIVE
Review of Systems   Constitutional: Positive for activity change and appetite change. Negative for chills, fatigue and unexpected weight change.   HENT: Positive for trouble swallowing. Negative for postnasal drip, rhinorrhea, sinus pressure and sinus pain.    Eyes: Negative for photophobia and visual disturbance.   Respiratory: Negative for cough, shortness of breath and wheezing.    Cardiovascular: Negative for chest pain, palpitations and leg swelling.   Gastrointestinal: Negative for abdominal distention, abdominal pain, constipation, diarrhea, nausea and vomiting.   Endocrine: Negative for polydipsia and polyuria.   Genitourinary: Negative for flank pain and hematuria.   Musculoskeletal: Negative for arthralgias, back pain, gait problem and joint swelling.   Skin: Negative for color change, pallor, rash and wound.   Neurological: Negative for dizziness, numbness and headaches.   Hematological: Negative for adenopathy. Does not bruise/bleed easily.     Objective:     Vital Signs (Most Recent):  Temp: 98.6 °F (37 °C) (05/25/20 0913)  Pulse: 68 (05/25/20 0913)  Resp: 17 (05/25/20 0858)  BP: 103/73 (05/25/20 0913)  SpO2: 98 % (05/25/20 0913) Vital Signs (24h Range):  Temp:  [97.1 °F (36.2 °C)-98.6 °F (37 °C)] 98.6 °F (37 °C)  Pulse:  [67-79] 68  Resp:  [17-28] 17  SpO2:  [91 %-98 %] 98 %  BP: (103-129)/(68-85) 103/73     Weight: 90.6 kg (199 lb 11.8 oz)  Body mass index is 28.66 kg/m².    Intake/Output Summary (Last 24 hours) at 5/25/2020 1007  Last data filed at 5/25/2020 0720  Gross per 24 hour   Intake 2790 ml   Output 1450 ml   Net 1340 ml      Physical Exam   Constitutional: He is oriented to person, place, and time. No distress.   HENT:   Mouth/Throat: Oropharynx is clear and moist. No oropharyngeal exudate.   Eyes: Conjunctivae are normal. No scleral icterus.   Neck: Normal range of motion. Neck supple. No JVD present. No tracheal deviation present. No thyromegaly present.   Cardiovascular: Normal rate,  regular rhythm, normal heart sounds and intact distal pulses. Exam reveals no gallop and no friction rub.   No murmur heard.  Pulmonary/Chest: Effort normal. No stridor. No respiratory distress. He has no wheezes. He has no rales.   Coarse breath sounds   Abdominal: Soft. Bowel sounds are normal. He exhibits no distension and no mass. There is no tenderness. There is no guarding.   Musculoskeletal: He exhibits no edema, tenderness or deformity.   Lymphadenopathy:     He has no cervical adenopathy.   Neurological: He is alert and oriented to person, place, and time. He displays normal reflexes. No cranial nerve deficit. He exhibits normal muscle tone. Coordination normal.   Right sided extremity weakness; strength 1/5 RUE and RLE   Skin: Skin is warm and dry. Capillary refill takes less than 2 seconds. No rash noted. He is not diaphoretic. No erythema.     Significant Labs:   CBC:   Recent Labs   Lab 05/24/20  0345 05/25/20  0514   WBC 11.49 10.93   HGB 13.9* 13.2*   HCT 45.9 43.9   * 293     CMP:   Recent Labs   Lab 05/24/20  0345 05/24/20  1745 05/25/20  0514   * 151* 150*   K 3.2* 3.5 3.6    107 108   CO2 37* 34* 35*   * 141* 149*   BUN 37* 38* 35*   CREATININE 0.9 1.0 0.9   CALCIUM 9.5 9.3 9.2   ANIONGAP 9 10 7*   EGFRNONAA >60.0 >60.0 >60.0       Significant Imaging: I have reviewed all pertinent imaging results/findings within the past 24 hours.

## 2020-05-25 NOTE — ASSESSMENT & PLAN NOTE
-- Hypovolemic hyponatremia  -- sodium levels 153, free water deficit 4.2 L (05/24)  -- patient has been getting 250 cc/Q6H free water flushes, increased free water flushes to 500 mL every 6 hours  -- sodium levels 150 on 05/25/20, free water deficit calculated 3.6 L  Plan:  -- will increase free water flushes to 7500 mL every 6 hours starting 8 am (05/25/20)  -- No oral intake due to aspiration risk  -- Repeat BMP at 1400   -- Correction rate not to exceed 12 meq in 24 hours period

## 2020-05-25 NOTE — PLAN OF CARE
Problem: SLP Goal  Goal: SLP Goal  Description  Speech Language Pathology Goals  Goals expected to be met by 5/28:    1. Pt will participate in ongoing swallow assessment  2. Pt will participate in ongoing trials of puree and honey thick liquids without overt s/s of aspiration or airway compromise.  3. Pt will utilize speech strategies with min cues  4. Pt will follow complex commands with 50% accy and min cues  5. Pt will complete problem solving task with 80% accy and min cues  6. Pt will participate in swallow exercises to facilitate strengthening of the swallowing mechanism in freq. of x5-10 reps.  Outcome: Ongoing, Progressing     Pt seen for po trials this date. ST discussed with RN regarding initiation of medications crushed in puree limit 3-4 tsp and puree for pleasure limit 3-4 tsp. Continue ST per POC.     Razia Camarillo CF-SLP  5/25/2020

## 2020-05-25 NOTE — PROGRESS NOTES
[facility] Medicine  Progress Note    Patient Name: Brett Vega  MRN: [id number]  Patient Class: IP- Inpatient   Admission Date: 5/14/2020  Length of Stay: 11 days  Attending Physician: Whitney Simon MD  Primary Care Provider: Elyse Parham MD    Subjective:     Principal Problem:Nontraumatic intracerebral hemorrhage    HPI:  Mr. Vega is a 76 yo male with no significant past medical history transferred from [facility] after he presented there for new onset of dense right hemiparesis and marked dysarthria. Onset unclear but likely around 3am on 05/14/20. No recent illness. Legally blind. BP on arrival to outside facility 247/129. Cardene ggt initiated. 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 Telemedicine-stroke done by Neurology and patient transferred to [facility] on 05/15/20 and admitted under [facility] service. Upon arrival, pt awake, alert, oriented, following commands. No movement against gravity on the right side. On Cardene ggt.      Hospital Course: 05/15/2020: SBP < 160, add hydralazine, PT/OT, evd watch  5/16/2020: increase Hydralazine, add losartan, start tube feeds, follow up CTH in AM, add silodosin and place cordova catheter, replace electrolytes PRN  5/17/2020: CT head stable, advance TF, add labetalol 100 q8hr, d/c cardene gtt  5/18/2020: required one prn dose of labetalol at 3 AM, scheduled hydralizine changed to q6 hrs, trial of puree consistency today although I do not feel patients swallowing is at point where can eat unattended or large quantities of food  5/19/20 Losartan increased to 100, hydralazine increased to 75. Resuming TF. Modified barium swallow test ordered. Nebs q6hr PRN. Transferred to Stroke Floor.   05/20/20: Patient on NG tube feeds, tachypnea and complaint of mild intermittent cough and associated dyspnea.     Hospital Medicine team consulted on 05/20/20  for respiratory distress.  Patient remains afebrile, heart rate wnl, no leukocyte count. tachypnea noted 22-28 rate with accessory muscle use. Chest congestion, gargling, coarse breath sounds and bibasilar wheezing on exam. CXR unremarkable for lobar consolidation, however reveals pulmonary congestive changes. no signs of volume over load on exam, Echo with no diastolic or systolic dysfunction. Primary team gave 20 IV lasix with UOP 2 L in 24 hours. COVID19 negative 05/20/20 05/21/20: Patient seen and examined at the bedside. Continues to be verbally responsive with mild dysarthria and right sided upper and lower extremity weakness. Oral and tube feeds on hold. Tachyapnea, with accessary muscles use but notes improvement in breathing compared to yesterday. Requiring 2.0 L nasal canula to maintain sats 95%. Scheduled for modified barium swallow study today.   05/22/20: No apparent concerns. Patient failed the swallowing study and today is s/p PEG tube placement per IR, patient tolerated the procedure well. Compared to yesterday, respiratory status is better. Remains NPO. RR 20-25. US LE doppler is negative for DVT. CXR unremarkable for ileus. CT abd w/ contrast consistent with infrarenal AAA 7 x 6.7 cm. Receiving scheduled neutralization. Lasix 20 mg given yesterday with UOP 2.225 L.   05/23/20: Pt states SOB is improved today. Vascular surgery recommending outpatient follow up for infrarenal AAA. Receiving scheduled nebs, patient encouraged to use IS. UOP 1.4 L yesterday. CTA negative for acute PE.   05/24/20: Reports no concerns. PEG tube feeds ongoing. Afebrile. Patient RR 21-25, however, oxygen requirements decreased to 2.0 L. Lasix 20 mg er G tube,  cc/24 hours. Sodium in the am is 153, free water deficit 4.2 L. Patient has been getting 250 cc fluids via PEG tube every 6 hours, will increase to 500 cc every 6 hours and repeat BMP at 1800.     Interval History (05/25/20): Patient seen and examined at  the bedside. No apparent concerns. Respiratory status improved. Wean off oxygen as tolerated. RR 17-20 per minute. No lasix given yesterday. UOP 1.45 L/24 hours. PEG tube feeds ongoing at goal 60 mL/hr with free water flushes increased to 750 cc every 6 hours. Patient had a bowel movement yesterday per the nursing staff.     Review of Systems   Constitutional: Positive for activity change and appetite change. Negative for chills, fatigue and unexpected weight change.   HENT: Positive for trouble swallowing. Negative for postnasal drip, rhinorrhea, sinus pressure and sinus pain.    Eyes: Negative for photophobia and visual disturbance.   Respiratory: Negative for cough, shortness of breath and wheezing.    Cardiovascular: Negative for chest pain, palpitations and leg swelling.   Gastrointestinal: Negative for abdominal distention, abdominal pain, constipation, diarrhea, nausea and vomiting.   Endocrine: Negative for polydipsia and polyuria.   Genitourinary: Negative for flank pain and hematuria.   Musculoskeletal: Negative for arthralgias, back pain, gait problem and joint swelling.   Skin: Negative for color change, pallor, rash and wound.   Neurological: Negative for dizziness, numbness and headaches.   Hematological: Negative for adenopathy. Does not bruise/bleed easily.     Objective:     Vital Signs (Most Recent):  Temp: 98.6 °F (37 °C) (05/25/20 0913)  Pulse: 68 (05/25/20 0913)  Resp: 17 (05/25/20 0858)  BP: 103/73 (05/25/20 0913)  SpO2: 98 % (05/25/20 0913) Vital Signs (24h Range):  Temp:  [97.1 °F (36.2 °C)-98.6 °F (37 °C)] 98.6 °F (37 °C)  Pulse:  [67-79] 68  Resp:  [17-28] 17  SpO2:  [91 %-98 %] 98 %  BP: (103-129)/(68-85) 103/73     Weight: 90.6 kg (199 lb 11.8 oz)  Body mass index is 28.66 kg/m².    Intake/Output Summary (Last 24 hours) at 5/25/2020 1007  Last data filed at 5/25/2020 0720  Gross per 24 hour   Intake 2790 ml   Output 1450 ml   Net 1340 ml      Physical Exam   Constitutional: He is oriented  to person, place, and time. No distress.   HENT:   Mouth/Throat: Oropharynx is clear and moist. No oropharyngeal exudate.   Eyes: Conjunctivae are normal. No scleral icterus.   Neck: Normal range of motion. Neck supple. No JVD present. No tracheal deviation present. No thyromegaly present.   Cardiovascular: Normal rate, regular rhythm, normal heart sounds and intact distal pulses. Exam reveals no gallop and no friction rub.   No murmur heard.  Pulmonary/Chest: Effort normal. No stridor. No respiratory distress. He has no wheezes. He has no rales.   Vesicular breath sounds   Abdominal: Soft. Bowel sounds are normal. He exhibits no distension and no mass. There is no tenderness. There is no guarding.   Musculoskeletal: He exhibits no edema, tenderness or deformity.   Lymphadenopathy:     He has no cervical adenopathy.   Neurological: He is alert and oriented to person, place, and time. He displays normal reflexes. No cranial nerve deficit. He exhibits normal muscle tone. Coordination normal.   Right sided extremity weakness; strength 1/5 RUE and RLE   Skin: Skin is warm and dry. Capillary refill takes less than 2 seconds. No rash noted. He is not diaphoretic. No erythema.     Significant Labs:   CBC:   Recent Labs   Lab 05/24/20  0345 05/25/20  0514   WBC 11.49 10.93   HGB 13.9* 13.2*   HCT 45.9 43.9   * 293     CMP:   Recent Labs   Lab 05/24/20  0345 05/24/20  1745 05/25/20  0514   * 151* 150*   K 3.2* 3.5 3.6    107 108   CO2 37* 34* 35*   * 141* 149*   BUN 37* 38* 35*   CREATININE 0.9 1.0 0.9   CALCIUM 9.5 9.3 9.2   ANIONGAP 9 10 7*   EGFRNONAA >60.0 >60.0 >60.0       Significant Imaging: I have reviewed all pertinent imaging results/findings within the past 24 hours.    Assessment/Plan:      * Nontraumatic intracerebral hemorrhage  -- stable  -- PT/OT recommending inpatient rehab  -- case management working on placement     Hypernatremia    -- Hypovolemic hyponatremia  -- sodium levels  153, free water deficit 4.2 L (05/24)  -- patient has been getting 250 cc/Q6H free water flushes, increased free water flushes to 500 mL every 6 hours  -- sodium levels 150 on 05/25/20, free water deficit calculated 3.6 L  Plan:  -- will increase free water flushes to 7500 mL every 6 hours starting 8 am (05/25/20)  -- No oral intake due to aspiration risk  -- Repeat BMP at 1400   -- Correction rate not to exceed 12 meq in 24 hours period     AAA (abdominal aortic aneurysm) without rupture  -- incidental finding when obtaining CT Abdomen for PEG tube placement  -- No evidence of rupture per imaging scan, patient is HDS  -- Vascular Surgery Consult for repair-->rec'ed outpatient followup (see their note dated 5/22)     Respiratory distress  74 y/o male admitted for caudate and thalamic hemorrhagic stroke initially in [facility] due to IV antihypertensives, stepped down to floor neurology team on 05/19/20.   -- tube feeds started on 05/19/20 at 10:32 am  -- patient remains afebrile, heart rate wnl, no leukocyte count  -- tachypnea noted 22-28 rate with accessory muscle use  -- Chest congestion, gargling, coarse breath sounds and bibasilar wheezing/crackles on exam  -- CXR unremarkable for lobar consolidation; has congestive changes  -- no signs of volume over load on exam, Echo with no diastolic or systolic dysfunction,   -- tested COVID19 negative on 05/20/20  -- had mild troponin elevation 0.4 - > 0.117 -> 0.113 likely type II demand ischemia in setting of hypertensive emergency  -- lasix naive, 20 mg IV given yesterday with UOP 2.0 L  -- lower extremity doppler ultrasound negative for DVT  -- abd XR negative for ileus, or constipation  -- CTA obtained 5/22 which was negative for PE  Assessment: Respiratory distress likely due to aspiration vs atelectasis   Plan:  -- pulmonary toilet with IS, chest PT, suctioning, acappella  -- encourage ambulation as tolerated. Out of bed, up to chair every morning as tolerated  --  Scheduled nebulization Levalbuterol/Ipatropium Q8H; PRN Q4H for wheezing and dsypnea  -- Hold oral feeds for now. Aspiration precautions, HOB > 60 degrees  -- PEG tube feeds resume with free water flushes Q6H (Increased to 750 cc Q6H at 8 am 05/25/20)     Essential hypertension  -- controlled  -- On amlodipine, Coreg 12.5 mg, hydralazine 75 mg TID, HCTZ 25 mg,  Losartan 100 mg  -- HR in 70-80's  Recs:  -- BP goal < 140/80 mmHg in patient with hemorraghic CVA and infrarenal AAA  -- would recommend BP meds to be titrated  -- Discontinued Hydralazine and amlodipine (05/25/20) @ 1:45 pm)    VTE Risk Mitigation (From admission, onward)         Ordered     enoxaparin injection 40 mg  Daily      05/18/20 1306     Reason for No Pharmacological VTE Prophylaxis  Once     Question:  Reasons:  Answer:  Risk of Bleeding    05/14/20 1101     IP VTE HIGH RISK PATIENT  Once      05/14/20 1101     Place sequential compression device  Until discontinued      05/14/20 1101              Thank you for your consult. Medicine Team will continue to follow. Please contact us if you have any additional questions.      Patient discussed and seen with Dr. Ham. Further reccs per the attending addendum.      Otto Alanis MD   Internal Medicine PGY 3  Department of Hospital Medicine   [facility]                      05/25/2020                             STAFF PHYSICIAN NOTE                                   Attending Attestation for Rounds with Resident  I have reviewed and concur with the resident's history, physical, assessment, and plan.  I have personally interviewed and examined the patient at bedside and agree with the resident's findings.                                     Mitzi Ham MD  Senior Hospitalist  [id number], [phone]

## 2020-05-25 NOTE — PROGRESS NOTES
[facility]  Vascular Neurology  Comprehensive Stroke Center  Progress Note    Assessment/Plan:     * Nontraumatic intracerebral hemorrhage  76 yo M with no known PMHx [place] [facility] transferred from OSH for large L thalamic and caudate ICH with intraventricular extension.  Of note, patient had SBP 240s documented at OSH.  Pt otherwise has no clear risk factors for ICH. No recent trauma.  No unexplained weight loss or B symptoms.  Admitted to Neuro ICU w/ NSGY following.  Etiology likely hypertensive, ruled out vascular malformation w/ further imaging. Repeat CT Head in 5/17: stable without evidence for significant interval detrimental change.    Antithrombotics for secondary stroke prevention:  None--ICH  Statins for secondary stroke prevention/HLD: LDL elevated--would not start statin acutely in setting of ICH, but could discuss low dose statin for reduction of stroke risk factors in stroke clinic follow up  Aggressive risk factor modification: HTN  Rehab efforts: PT/OT/SLP/PM&R, inpatient rehab   Diagnostics ordered/pending: none  VTE prophylaxis: Lovenox 40 daily  BP parameters: ICH: SBP <140    Hypernatremia  Likely due to volume depletion, Na 150 this AM.   Increasing enteral water boluses, increased from 500 to 750 q6h on 5/25  Hospital medicine managing , appreciate recs    AAA (abdominal aortic aneurysm) without rupture  Found incidentally on CT abdomen ordered for PEG placement  7x6.7 cm  Vascular surgery consulted and no acute intervention warranted at this time  Needs follow up outpatient     Dysphagia  NG tube in place  Patient failed MBSS, now NPO per SLP  PEG tube placed 5/22    Respiratory distress  Patient with increased work of breathing, tachypnea, and hypoxia.  Chest x-ray with pulmonary edema.  Patient afebrile with no leukocytosis  -IVF discontinued.   -Lasix 20 mg given with good output, but no sustained improvement noted  -BNP - 310  -Troponin mildly elevated  but trending down - most likely ischemic demand  -EKG with no acute changes.  -COVID test negative  -Consult to IM - appreciate recommendations   -- pulmonary toilet with IS, chest PT, BID suction    -- Scheduled nebulization Levalbuterol/Ipatropium Q8H; PRN Q4H for wheezing and dsypnea   -- NPO. Aspiration precautions, HOB > 60 degrees   -- LE US negative for DVT   -- abd XR negative for ileus or constipation    --CTA of chest negative for PE.  Bibasilar atelectasis   Patient breathing improving 05/24, but was found to be drinking mouthwash overnight.  Will continue to monitor for signs of aspiration      Dysarthria  -2/2 ICH, continue SLP eval and treat    Hemiparesis, right  -2/2 ICH, continue PT eval and treat  recommending discharge to inpatient rehab    Vasogenic cerebral edema  Area of vasogenic cerebral edema identified when reviewing brain imaging in the L basal ganglia. There is mass effect associated with it. We will continue to monitor the patients clinical exam for any worsening of symptoms which may indicate expansion of the hemorrhage or the area of the edema resulting in the clinical change. The ICH is likely 2/2 uncontrolled HTN.      Essential hypertension  -Stroke risk factor, likely etiology of ICH in this pt  - Med regiment adjusted:   Coreg 12.5mg BID   Norvasc 10mg Daily   HCTZ to 25 mg Daily   - weaning hydralazine, switched from hydralazine 75 mg QID to TID on 5/24, will wean to 50q8h today      Anticipating medical stability for discharge tomorrow pending rehab acceptance.        05/14/2020:  Transferred to [facility] [place] [place] from [facility] for L ICH/IVH from caudate and thalamus  05/15/2020:   Continues on cardene for SBP < 140, neurosurgery following.  05/16/2020:   Started back on cardene for SBP < 140 this AM  05/18/2020:  Off Cardene drip, placed on Losartan and Hydralazine. Lovenox.  05/19/2010:  /180. Still with Rt side weakness.   5/20: Stepped down overnight. Increased work of  breathing, tachypnic.  IVF discontinued.  Crackles in bases and expiratory wheezes.  CXR with pulmonary edema.  Lasix given with good output but no sustained improvement of breathing.  Currently COVID rule out.  IM following. Blood pressure elevated - changed to carvedilol 12.5mg BID, Norvasc 10mg daily, with plans to start HCTZ tomorrow.  Continuing Hydralazine until BP stable and can change to PRN only. TF on hold due to respiratory status.  Scheduled for MBSS tomorrow if able to tolerate.  5/21 COVID test negative, tachypnea persisting, hospital medicine assisting, abdominal XR and US LE pending, additional IV lasix dose ordered, patient failed MBSS, will discuss PEG placement  5/22 patient had PEG placed this morning, weaning hydralazine and increasing HCTZ. Repeat CXR pending to f/u on pulmonary edema  05/23 Started tube feeds and enteral water boluses through PEG tube today   05/24 Hypernatremic overnight.  Likely due to volume depletion.  Hospital medicine increasing free water boluses and will get a BMP this afternoon.  Urine studies pending.  Nursing staff found patient drinking mouthwash.  Will continue to monitor for signs of aspiration.  Breathing improving.  5/25: NAEON. Na 150 this AM, free water increased by HM to 500q6h. Maintaining sats on 2L NC. Last BM 5/24. Pending IPR.     STROKE DOCUMENTATION   Acute Stroke Times   Last Known Normal Time: 0300  Symptom Onset Date: 05/14/20  Symptom Onset Time: 0700  Stroke Team Called Time: 0806  Stroke Team Arrival Time: 0809  CT Interpretation Time: 0809  Decision to Treat Time for Alteplase: (Contraindicated 2/2 ICH)  Decision to Treat Time for IR: (no LVO)    NIH Scale:      1a. Level of Consciousness: 0-->Alert, keenly responsive  1b. LOC Questions: 1-->Answers one question correctly  1c. LOC Commands: 0-->Performs both tasks correctly  2. Best Gaze: 0-->Normal  3. Visual: 0-->No visual loss  4. Facial Palsy: 1-->Minor paralysis (flattened nasolabial fold,  asymmetry on smiling)  5a. Motor Arm, Left: 0-->No drift, limb holds 90 (or 45) degrees for full 10 secs  5b. Motor Arm, Right: 3-->No effort against gravity, limb falls  6a. Motor Leg, Left: 0-->No drift, leg holds 30 degree position for full 5 secs  6b. Motor Leg, Right: 3-->No effort against gravity, leg falls to bed immediately  7. Limb Ataxia: 0-->Absent  8. Sensory: 0-->Normal, no sensory loss  9. Best Language: 0-->No aphasia, normal  10. Dysarthria: 1-->Mild-to-moderate dysarthria, patient slurs at least some words and, at worst, can be understood with some difficulty  11. Extinction and Inattention (formerly Neglect): 0-->No abnormality  Total (NIH Stroke Scale): 9     Modified Kina Score: 0  Needham Coma Scale:    ABCD2 Score:    [id number] Score:   HAS -BLED Score:   ICH Score:1  Hunt & Mccray Classification:      Hemorrhagic change of an Ischemic Stroke: Does this patient have an ischemic stroke with hemorrhagic changes? No     Neurologic Chief Complaint: L thalamic/caudate ICH with IVH    Subjective:     Interval History: Patient is seen for follow-up neurological assessment and treatment recommendations: NAEON. Na 150 this AM, free water increased by HM to 750q6h. Free water deficit 3.9L. Maintaining sats on 2L NC. Last BM 5/24. Pending IPR.     HPI, Past Medical, Family, and Social History remains the same as documented in the initial encounter.     Review of Systems   Constitutional: Negative for chills and fever.   HENT: Positive for trouble swallowing.    Eyes: Negative for visual disturbance.   Respiratory: Negative for cough, chest tightness and shortness of breath.    Cardiovascular: Negative for chest pain.   Gastrointestinal: Negative for abdominal pain, constipation and vomiting.   Skin: Negative.    Neurological: Positive for facial asymmetry, speech difficulty and weakness.   Hematological: Negative.    Psychiatric/Behavioral: Negative.    Scheduled Meds:   amLODIPine  10 mg Oral Daily     carvediloL  12.5 mg Oral BID    enoxaparin  40 mg Subcutaneous Daily    hydrALAZINE  75 mg Oral Q8H    hydroCHLOROthiazide  25 mg Oral Daily    ipratropium  0.5 mg Nebulization Q8H    levalbuterol  0.63 mg Nebulization Q8H    losartan  100 mg Oral Daily    polyethylene glycol  17 g Oral Daily    senna-docusate 8.6-50 mg  1 tablet Oral BID    silodosin  4 mg Oral Daily     Continuous Infusions:  PRN Meds:acetaminophen, albuterol-ipratropium, hydrALAZINE, labetaloL, sodium chloride 0.9%    Objective:     Vital Signs (Most Recent):  Temp: 97.4 °F (36.3 °C) (05/25/20 0459)  Pulse: 69 (05/25/20 0459)  Resp: (!) 25 (05/25/20 0459)  BP: 111/77 (05/25/20 0459)  SpO2: 95 % (05/25/20 0459)  BP Location: Right arm    Vital Signs Range (Last 24H):  Temp:  [97.1 °F (36.2 °C)-97.7 °F (36.5 °C)]   Pulse:  [68-79]   Resp:  [18-28]   BP: (111-129)/(68-85)   SpO2:  [91 %-96 %]   BP Location: Right arm    Physical Exam   Constitutional: He is oriented to person, place, and time. He appears well-developed and well-nourished. No distress.   HENT:   Head: Normocephalic and atraumatic.   Cardiovascular: Normal rate.   Pulmonary/Chest: No tachypnea. No respiratory distress.   Abdominal: There is no tenderness.   Neurological: He is alert and oriented to person, place, and time.   Skin: Skin is warm and dry.   Vitals reviewed.     Neurological Exam:   LOC: alert  Attention Span: Good   Language: No aphasia  Articulation: Dysarthria  Orientation: Not oriented to time  Visual Fields: Full  EOM (CN III, IV, VI): Full/intact  Facial Movement (CN VII): asymmetric facial expression    Motor: Arm left  Normal 5/5  Leg left  Paresis: 4/5  Arm right  Paresis: 2/5, some movement R bicep w/gravity eliminated  Leg right Paresis: 2/5  Sensation: Intact to light touch, temperature and vibration  Tone:normal     Laboratory:  CMP:   Recent Labs   Lab 05/25/20  0514   CALCIUM 9.2   *   K 3.6   CO2 35*      BUN 35*   CREATININE 0.9      CBC:   Recent Labs   Lab 05/25/20  0514   WBC 10.93   RBC 4.56*   HGB 13.2*   HCT 43.9      MCV 96   MCH 28.9   MCHC 30.1*       Diagnostic Results     Brain Imaging   CT head (05/17/20)   - Left basal ganglia parenchymal hemorrhage with intraventricular extension again noted, the overall appearance is stable without evidence for significant interval detrimental change.     CT Head (05/14/20 2147)  Redemonstration of an acute parenchymal hemorrhage centered within the left basal ganglia with intraventricular extension.  Persistent ventricular prominence, stable in size when compared to prior study, in keeping with evolving hydrocephalus.    No evidence of new hemorrhage     CT Head (05/14/20 1501)  Evolving acute parenchymal hemorrhage centered left basal ganglia with intraventricular extension.    Continued intraventricular hemorrhage with mild distention lateral and 3rd ventricles concerning for component of hydrocephalus.    No significant increased volume of hemorrhage.       CT head (05/14/20 0813):  Evidence of intraparenchymal hemorrhage centered in the region of the posterior limb of the left internal capsule with intraventricular extension as above.       Cardiac Imaging   TTE (05/15/20):  · Technically difficult portable study  · Normal left ventricular systolic function. The estimated ejection fraction is 55%.  · Normal right ventricular systolic function.  · Indeterminate left ventricular diastolic function.  · Normal central venous pressure (3 mmHg).      Caty Johnson MD  Comprehensive Stroke Center  Department of Vascular Neurology   [facility]

## 2020-05-25 NOTE — PT/OT/SLP PROGRESS
Speech Language Pathology Treatment    Patient Name:  Brett Vega   MRN:  [id number]  Admitting Diagnosis: Nontraumatic intracerebral hemorrhage    Recommendations:                 General Recommendations:  Dysphagia therapy and Speech/language therapy  Diet recommendations:  NPO, Liquid Diet Level: NPO Meds crushed in puree/puree for pleasure (limit 3-4 tsp)   Aspiration Precautions: Alternate means of nutrition/hydration, double swallow with each tsp puree (limit 3-4 tsp), HOB to 90 degrees, Meds crushed in puree, Small bites/sips and Strict aspiration precautions   General Precautions: Standard, aspiration, fall, vision impaired, NPO  Communication strategies:  provide increased time to answer and go to room if call light pushed    Subjective     Pt awake and alert upon ST entry. Pt agreeable to participate with ST.    Pain/Comfort:  · Pain Rating 1: (No numerical value)  · Location - Side 1: Left  · Location 1: abdomen  · Pain Addressed 1: Nurse notified, Reposition, Cessation of Activity  · Pain Rating Post-Intervention 1: (No numerical value)    Objective:     Has the patient been evaluated by SLP for swallowing?   Yes  Keep patient NPO? Yes   Current Respiratory Status: nasal cannula      Pt repositioned upright for safest po posture. Pt participated in swallow exercises including falsetto /i/, yoana maneuver, and effortful swallow. Pt completed falsetto /i/ x5 without difficulty. Pt completed x1 yoana maneuver and x1 effortful swallow with ongoing lingual pumping during following attempts. Pt consumed x3 tsp honey thick liquids with throat clear upon 3rd trial. Pt consumed x7 tsp puree without overt s/s of aspiration or airway compromise. Pt to remain NPO with alternate means of nutrition/hydration/medications. ST educated pt re: role of SLP, MBSS results, risk for aspiration, current NPO status, and POC moving forward to which pt verbalized understanding, however reinforcement recommended. ST discussed  findings with RN. Meds crushed in puree and puree for pleasure acceptable if limited to 3-4 tsp. Continue ST per POC.     Assessment:     Brett Vega is a 75 y.o. male with an SLP diagnosis of Dysphagia and Dysarthria.     Goals:   Multidisciplinary Problems     SLP Goals        Problem: SLP Goal    Goal Priority Disciplines Outcome   SLP Goal     SLP Ongoing, Progressing   Description:  Speech Language Pathology Goals  Goals expected to be met by 5/28:    1. Pt will participate in ongoing swallow assessment  2. Pt will participate in ongoing trials of puree and honey thick liquids without overt s/s of aspiration or airway compromise.  3. Pt will utilize speech strategies with min cues  4. Pt will follow complex commands with 50% accy and min cues  5. Pt will complete problem solving task with 80% accy and min cues  6. Pt will participate in swallow exercises to facilitate strengthening of the swallowing mechanism in freq. of x5-10 reps.                           Plan:     · Patient to be seen:  5 x/week   · Plan of Care expires:     · Plan of Care reviewed with:  patient   · SLP Follow-Up:  Yes       Discharge recommendations:  rehabilitation facility   Barriers to Discharge:  Level of Skilled Assistance Needed      Time Tracking:     SLP Treatment Date:   05/25/20  Speech Start Time:  0927  Speech Stop Time:  0945     Speech Total Time (min):  18 min    Billable Minutes: Treatment Swallowing Dysfunction 10 and Seld Care/Home Management Training 8    Razia Camarillo CF-SLP  05/25/2020

## 2020-05-25 NOTE — PLAN OF CARE
Problem: Adult Inpatient Plan of Care  Goal: Plan of Care Review  Outcome: Ongoing, Progressing     POC reviewed with patient this shift.  Quiet uneventful shift.  Remains A/O x4.  Respirations unlabored.  Skin WDI.  O2 at 2L/NC.  Appears to be tolerating well.  [place] cath remains intact with clear, maryam urine draining into urimeter without difficulty.  Peg-tube intact with feeding infusing at goal.  Increased water bolus tolerated well.  No nausea/discomfort/residual noted.  Pt finally had BM on this shift.  Pt still continues to request food/water po despite continued re-education.  Oral care provided several times throughout shift.  Pt also still continues to refuse turning but does allow weight-shift assistance.  Awaiting bariatric bed arrival.  VSS.  See flowsheet for full assessment.  Able to verbalize wants/needs.  No c/o pain or discomfort at this time.

## 2020-05-25 NOTE — ASSESSMENT & PLAN NOTE
74 y/o male admitted for caudate and thalamic hemorrhagic stroke initially in [facility] due to IV antihypertensives, stepped down to floor neurology team on 05/19/20.   -- tube feeds started on 05/19/20 at 10:32 am  -- patient remains afebrile, heart rate wnl, no leukocyte count  -- tachypnea noted 22-28 rate with accessory muscle use  -- Chest congestion, gargling, coarse breath sounds and bibasilar wheezing/crackles on exam  -- CXR unremarkable for lobar consolidation; has congestive changes  -- no signs of volume over load on exam, Echo with no diastolic or systolic dysfunction,   -- tested COVID19 negative on 05/20/20  -- had mild troponin elevation 0.4 - > 0.117 -> 0.113 likely type II demand ischemia in setting of hypertensive emergency  -- lasix naive, 20 mg IV given yesterday with UOP 2.0 L  -- lower extremity doppler ultrasound negative for DVT  -- abd XR negative for ileus, or constipation  -- CTA obtained 5/22 which was negative for PE  Assessment: Respiratory distress likely due to aspiration vs atelectasis   Plan:  -- pulmonary toilet with IS, chest PT, suctioning, acappella  -- encourage ambulation as tolerated. Out of bed, up to chair every morning as tolerated  -- Scheduled nebulization Levalbuterol/Ipatropium Q8H; PRN Q4H for wheezing and dsypnea  -- Hold oral feeds for now. Aspiration precautions, HOB > 60 degrees  -- PEG tube feeds resume with free water flushes Q6H (Increased to 750 cc Q6H at 8 am 05/25/20)

## 2020-05-25 NOTE — PT/OT/SLP PROGRESS
"Physical Therapy Treatment    Patient Name:  Brett Vega   MRN:  [id number]    Recommendations:     Discharge Recommendations:  rehabilitation facility   Discharge Equipment Recommendations: 3-in-1 commode, shower chair, wheelchair, hospital bed   Barriers to discharge: Decreased caregiver support requires assist for mobility    Assessment:     Brett Vega is a 75 y.o. male admitted with a medical diagnosis of Nontraumatic intracerebral hemorrhage.  He presents with the following impairments/functional limitations:  weakness, impaired balance, impaired endurance, impaired functional mobilty, gait instability, decreased lower extremity function, decreased upper extremity function, decreased safety awareness, impaired cardiopulmonary response to activity. Pt is unsafe with functional mobility at this time due to pt requires max assist for bed mobility, max assist for transfers, and total assist for gait due to R sided weakness, instability, and impaired midline orientation. Pt is motivated to progress with functional mobility.    Rehab Prognosis: Good; patient would benefit from acute skilled PT services to address these deficits and reach maximum level of function.    Recent Surgery: * No surgery found *      Plan:     During this hospitalization, patient to be seen 4 x/week to address the identified rehab impairments via gait training, therapeutic activities, therapeutic exercises, neuromuscular re-education and progress toward the following goals:    · Plan of Care Expires:  06/14/20    Subjective   "i'm tired"  Pain/Comfort:  · Pain Rating 1: 0/10  · Pain Rating Post-Intervention 1: 0/10      Objective:     Communicated with nurse prior to session.  Patient found supine with Condom Catheter, bed alarm, PEG Tube, telemetry, peripheral IV, pulse ox (continuous) upon PT entry to room.     General Precautions: Standard, aspiration, fall, vision impaired, NPO   Orthopedic Precautions:N/A   Braces: N/A "     Functional Mobility:  · Bed Mobility:     · Rolling Left:  maximal assistance  · Supine to Sit: maximal assistance  · Transfers:     · Sit to Stand:  maximal assistance with hand-held assist and OT assisted with R UE positioning and weight bearing  · Transferred bed to bedside chair with max assist   · Gait: pt performed pre gait stepping activities x 3 sets of 5-8 reps as tolerate: required manual cues at R knee to facilitate extension during stance phase to allow pt to step forward and back with L LE.    AM-PAC 6 CLICK MOBILITY  Turning over in bed (including adjusting bedclothes, sheets and blankets)?: 2  Sitting down on and standing up from a chair with arms (e.g., wheelchair, bedside commode, etc.): 2  Moving from lying on back to sitting on the side of the bed?: 2  Moving to and from a bed to a chair (including a wheelchair)?: 2  Need to walk in hospital room?: 1  Climbing 3-5 steps with a railing?: 1  Basic Mobility Total Score: 10     Therapeutic Activities and Exercises:   pt sat on the EOB ~ 18 min between standing trials with CGA once positioned on the EOB and max assist to avoid posterior and R sided LOB during activities and when trying to scoot to the EOB. Pt performed B LE exs in sitting x 10 reps: knee ext and ankle pumps (with PROM on R LE)    Patient left up in chair with all lines intact, call button in reach, chair alarm on and nurse notified..    GOALS:   Multidisciplinary Problems     Physical Therapy Goals        Problem: Physical Therapy Goal    Goal Priority Disciplines Outcome Goal Variances Interventions   Physical Therapy Goal     PT, PT/OT Ongoing, Progressing     Description:  PT goals until 5/31/20    1. Pt supine to sit with minimal assist-not met  2. Pt sit to supine with minimal assist-not met  3. Pt sit to stand with hemiwalker with minimal assist-not met  4. Pt to perform gait 5ft with hemiwalker with max assist.-not met  5. Pt to transfer bed to/from bedside chair with  hemiwalker with moderate assist.-not met  6. Pt to perform B LE exs in sitting or supine x 15 reps to strengthen B LE to improve functional mobility.-not met  7. Pt to sit on the EOB ~ 10 min with CGA to allow pt to perform functional activities-not met                      Time Tracking:     PT Received On: 05/25/20  PT Start Time: 1128     PT Stop Time: 1152  PT Total Time (min): 24 min     Billable Minutes: Therapeutic Activity 14 and Therapeutic Exercise 10    Treatment Type: Treatment  PT/PTA: PT     PTA Visit Number: 1     Marissa Guajardo, PT  05/25/2020

## 2020-05-25 NOTE — PLAN OF CARE
Problem: Adult Inpatient Plan of Care  Goal: Plan of Care Review  Outcome: Ongoing, Progressing     Problem: Adult Inpatient Plan of Care  Goal: Readiness for Transition of Care  Outcome: Ongoing, Progressing     Problem: Feeding Intolerance (Enteral Nutrition)  Goal: Feeding Tolerance  Outcome: Ongoing, Progressing

## 2020-05-26 VITALS
HEART RATE: 70 BPM | BODY MASS INDEX: 28.6 KG/M2 | TEMPERATURE: 98 F | DIASTOLIC BLOOD PRESSURE: 90 MMHG | SYSTOLIC BLOOD PRESSURE: 141 MMHG | OXYGEN SATURATION: 93 % | HEIGHT: 70 IN | WEIGHT: 199.75 LBS | RESPIRATION RATE: 20 BRPM

## 2020-05-26 LAB
ANION GAP SERPL CALC-SCNC: 4 MMOL/L (ref 8–16)
ANION GAP SERPL CALC-SCNC: 5 MMOL/L (ref 8–16)
BASOPHILS # BLD AUTO: 0.01 K/UL (ref 0–0.2)
BASOPHILS NFR BLD: 0.1 % (ref 0–1.9)
BUN SERPL-MCNC: 31 MG/DL (ref 8–23)
BUN SERPL-MCNC: 31 MG/DL (ref 8–23)
CALCIUM SERPL-MCNC: 8.7 MG/DL (ref 8.7–10.5)
CALCIUM SERPL-MCNC: 8.8 MG/DL (ref 8.7–10.5)
CHLORIDE SERPL-SCNC: 102 MMOL/L (ref 95–110)
CHLORIDE SERPL-SCNC: 103 MMOL/L (ref 95–110)
CO2 SERPL-SCNC: 35 MMOL/L (ref 23–29)
CO2 SERPL-SCNC: 37 MMOL/L (ref 23–29)
CREAT SERPL-MCNC: 0.8 MG/DL (ref 0.5–1.4)
CREAT SERPL-MCNC: 0.8 MG/DL (ref 0.5–1.4)
DIFFERENTIAL METHOD: ABNORMAL
EOSINOPHIL # BLD AUTO: 0.1 K/UL (ref 0–0.5)
EOSINOPHIL NFR BLD: 1.4 % (ref 0–8)
ERYTHROCYTE [DISTWIDTH] IN BLOOD BY AUTOMATED COUNT: 13.3 % (ref 11.5–14.5)
EST. GFR  (AFRICAN AMERICAN): >60 ML/MIN/1.73 M^2
EST. GFR  (AFRICAN AMERICAN): >60 ML/MIN/1.73 M^2
EST. GFR  (NON AFRICAN AMERICAN): >60 ML/MIN/1.73 M^2
EST. GFR  (NON AFRICAN AMERICAN): >60 ML/MIN/1.73 M^2
GLUCOSE SERPL-MCNC: 129 MG/DL (ref 70–110)
GLUCOSE SERPL-MCNC: 130 MG/DL (ref 70–110)
HCT VFR BLD AUTO: 39.3 % (ref 40–54)
HGB BLD-MCNC: 11.8 G/DL (ref 14–18)
IMM GRANULOCYTES # BLD AUTO: 0.06 K/UL (ref 0–0.04)
IMM GRANULOCYTES NFR BLD AUTO: 0.7 % (ref 0–0.5)
LYMPHOCYTES # BLD AUTO: 1 K/UL (ref 1–4.8)
LYMPHOCYTES NFR BLD: 12.2 % (ref 18–48)
MAGNESIUM SERPL-MCNC: 2.2 MG/DL (ref 1.6–2.6)
MCH RBC QN AUTO: 29 PG (ref 27–31)
MCHC RBC AUTO-ENTMCNC: 30 G/DL (ref 32–36)
MCV RBC AUTO: 97 FL (ref 82–98)
MONOCYTES # BLD AUTO: 0.8 K/UL (ref 0.3–1)
MONOCYTES NFR BLD: 9.3 % (ref 4–15)
NEUTROPHILS # BLD AUTO: 6.4 K/UL (ref 1.8–7.7)
NEUTROPHILS NFR BLD: 76.3 % (ref 38–73)
NRBC BLD-RTO: 0 /100 WBC
PHOSPHATE SERPL-MCNC: 4.1 MG/DL (ref 2.7–4.5)
PLATELET # BLD AUTO: 251 K/UL (ref 150–350)
PMV BLD AUTO: 10.2 FL (ref 9.2–12.9)
POTASSIUM SERPL-SCNC: 3.4 MMOL/L (ref 3.5–5.1)
POTASSIUM SERPL-SCNC: 3.4 MMOL/L (ref 3.5–5.1)
RBC # BLD AUTO: 4.07 M/UL (ref 4.6–6.2)
SODIUM SERPL-SCNC: 143 MMOL/L (ref 136–145)
SODIUM SERPL-SCNC: 143 MMOL/L (ref 136–145)
WBC # BLD AUTO: 8.39 K/UL (ref 3.9–12.7)

## 2020-05-26 PROCEDURE — 25000003 PHARM REV CODE 250: Performed by: NURSE PRACTITIONER

## 2020-05-26 PROCEDURE — 99232 PR SUBSEQUENT HOSPITAL CARE,LEVL II: ICD-10-PCS | Mod: ,,, | Performed by: HOSPITALIST

## 2020-05-26 PROCEDURE — 25000003 PHARM REV CODE 250: Performed by: PHYSICIAN ASSISTANT

## 2020-05-26 PROCEDURE — 97530 THERAPEUTIC ACTIVITIES: CPT

## 2020-05-26 PROCEDURE — 99239 PR HOSPITAL DISCHARGE DAY,>30 MIN: ICD-10-PCS | Mod: GC,,, | Performed by: PSYCHIATRY & NEUROLOGY

## 2020-05-26 PROCEDURE — 97535 SELF CARE MNGMENT TRAINING: CPT

## 2020-05-26 PROCEDURE — 99232 PR SUBSEQUENT HOSPITAL CARE,LEVL II: ICD-10-PCS | Mod: ,,, | Performed by: NURSE PRACTITIONER

## 2020-05-26 PROCEDURE — 25000003 PHARM REV CODE 250: Performed by: STUDENT IN AN ORGANIZED HEALTH CARE EDUCATION/TRAINING PROGRAM

## 2020-05-26 PROCEDURE — 83735 ASSAY OF MAGNESIUM: CPT

## 2020-05-26 PROCEDURE — 99232 SBSQ HOSP IP/OBS MODERATE 35: CPT | Mod: ,,, | Performed by: NURSE PRACTITIONER

## 2020-05-26 PROCEDURE — 85025 COMPLETE CBC W/AUTO DIFF WBC: CPT

## 2020-05-26 PROCEDURE — 36415 COLL VENOUS BLD VENIPUNCTURE: CPT

## 2020-05-26 PROCEDURE — 97116 GAIT TRAINING THERAPY: CPT

## 2020-05-26 PROCEDURE — 99239 HOSP IP/OBS DSCHRG MGMT >30: CPT | Mod: GC,,, | Performed by: PSYCHIATRY & NEUROLOGY

## 2020-05-26 PROCEDURE — 84100 ASSAY OF PHOSPHORUS: CPT

## 2020-05-26 PROCEDURE — 92526 ORAL FUNCTION THERAPY: CPT

## 2020-05-26 PROCEDURE — 80048 BASIC METABOLIC PNL TOTAL CA: CPT | Mod: 91

## 2020-05-26 PROCEDURE — 97803 MED NUTRITION INDIV SUBSEQ: CPT

## 2020-05-26 PROCEDURE — 99232 SBSQ HOSP IP/OBS MODERATE 35: CPT | Mod: ,,, | Performed by: HOSPITALIST

## 2020-05-26 RX ORDER — HYDRALAZINE HYDROCHLORIDE 20 MG/ML
10 INJECTION INTRAMUSCULAR; INTRAVENOUS EVERY 4 HOURS PRN
Status: DISCONTINUED | OUTPATIENT
Start: 2020-05-26 | End: 2020-05-26 | Stop reason: HOSPADM

## 2020-05-26 RX ORDER — LOSARTAN POTASSIUM 100 MG/1
100 TABLET ORAL DAILY
Qty: 90 TABLET | Refills: 3 | Status: SHIPPED | OUTPATIENT
Start: 2020-05-27 | End: 2022-03-01

## 2020-05-26 RX ORDER — AMOXICILLIN 250 MG
1 CAPSULE ORAL 2 TIMES DAILY
Status: CANCELLED | OUTPATIENT
Start: 2020-05-26

## 2020-05-26 RX ORDER — IPRATROPIUM BROMIDE AND ALBUTEROL SULFATE 2.5; .5 MG/3ML; MG/3ML
3 SOLUTION RESPIRATORY (INHALATION) EVERY 4 HOURS PRN
Qty: 1 BOX | Refills: 0 | Status: SHIPPED | OUTPATIENT
Start: 2020-05-26 | End: 2021-01-15

## 2020-05-26 RX ORDER — HYDROCHLOROTHIAZIDE 25 MG/1
25 TABLET ORAL DAILY
Qty: 30 TABLET | Refills: 11 | Status: SHIPPED | OUTPATIENT
Start: 2020-05-27 | End: 2020-09-09

## 2020-05-26 RX ORDER — SODIUM CHLORIDE 0.9 % (FLUSH) 0.9 %
10 SYRINGE (ML) INJECTION
Status: CANCELLED | OUTPATIENT
Start: 2020-05-26

## 2020-05-26 RX ORDER — LABETALOL HCL 20 MG/4 ML
10 SYRINGE (ML) INTRAVENOUS EVERY 4 HOURS PRN
Status: DISCONTINUED | OUTPATIENT
Start: 2020-05-26 | End: 2020-05-26 | Stop reason: HOSPADM

## 2020-05-26 RX ORDER — SILODOSIN 4 MG/1
4 CAPSULE ORAL DAILY
Qty: 30 CAPSULE | Refills: 11 | Status: SHIPPED | OUTPATIENT
Start: 2020-05-27 | End: 2020-09-09

## 2020-05-26 RX ORDER — SODIUM,POTASSIUM PHOSPHATES 280-250MG
2 POWDER IN PACKET (EA) ORAL ONCE
Status: COMPLETED | OUTPATIENT
Start: 2020-05-26 | End: 2020-05-26

## 2020-05-26 RX ORDER — ACETAMINOPHEN 325 MG/1
650 TABLET ORAL EVERY 4 HOURS PRN
Status: CANCELLED | OUTPATIENT
Start: 2020-05-26

## 2020-05-26 RX ORDER — CARVEDILOL 12.5 MG/1
12.5 TABLET ORAL 2 TIMES DAILY
Status: CANCELLED | OUTPATIENT
Start: 2020-05-26

## 2020-05-26 RX ORDER — HYDROCHLOROTHIAZIDE 25 MG/1
25 TABLET ORAL DAILY
Status: CANCELLED | OUTPATIENT
Start: 2020-05-27

## 2020-05-26 RX ORDER — ENOXAPARIN SODIUM 100 MG/ML
40 INJECTION SUBCUTANEOUS EVERY 24 HOURS
Status: CANCELLED | OUTPATIENT
Start: 2020-05-26

## 2020-05-26 RX ORDER — IPRATROPIUM BROMIDE AND ALBUTEROL SULFATE 2.5; .5 MG/3ML; MG/3ML
3 SOLUTION RESPIRATORY (INHALATION) EVERY 4 HOURS PRN
Status: CANCELLED | OUTPATIENT
Start: 2020-05-26

## 2020-05-26 RX ORDER — LOSARTAN POTASSIUM 50 MG/1
100 TABLET ORAL DAILY
Status: CANCELLED | OUTPATIENT
Start: 2020-05-27

## 2020-05-26 RX ORDER — POLYETHYLENE GLYCOL 3350 17 G/17G
17 POWDER, FOR SOLUTION ORAL DAILY
Status: CANCELLED | OUTPATIENT
Start: 2020-05-27

## 2020-05-26 RX ORDER — CARVEDILOL 12.5 MG/1
12.5 TABLET ORAL 2 TIMES DAILY
Qty: 60 TABLET | Refills: 11 | Status: SHIPPED | OUTPATIENT
Start: 2020-05-26 | End: 2020-09-03 | Stop reason: DRUGHIGH

## 2020-05-26 RX ORDER — SILODOSIN 4 MG/1
4 CAPSULE ORAL DAILY
Status: CANCELLED | OUTPATIENT
Start: 2020-05-27

## 2020-05-26 RX ADMIN — POLYETHYLENE GLYCOL 3350 17 G: 17 POWDER, FOR SOLUTION ORAL at 09:05

## 2020-05-26 RX ADMIN — POTASSIUM & SODIUM PHOSPHATES POWDER PACK 280-160-250 MG 2 PACKET: 280-160-250 PACK at 09:05

## 2020-05-26 RX ADMIN — STANDARDIZED SENNA CONCENTRATE AND DOCUSATE SODIUM 1 TABLET: 8.6; 5 TABLET ORAL at 09:05

## 2020-05-26 RX ADMIN — SILODOSIN 4 MG: 4 CAPSULE ORAL at 09:05

## 2020-05-26 RX ADMIN — CARVEDILOL 12.5 MG: 12.5 TABLET, FILM COATED ORAL at 09:05

## 2020-05-26 RX ADMIN — HYDROCHLOROTHIAZIDE 25 MG: 25 TABLET ORAL at 09:05

## 2020-05-26 RX ADMIN — LOSARTAN POTASSIUM 100 MG: 50 TABLET ORAL at 09:05

## 2020-05-26 NOTE — PLAN OF CARE
NATALIO recevied message from [facility] rehav appr for patient #[id number]    CM spoke with patients wife regarding preference for [facility] [place], the facility wants more clinical information and feel patient has declined slightly. Also that [facility] is willing to accept the patient and can take him today. Ms. Neff states she is good with the patient going to [facility]. Marie with [facility] will call patients wife to discuss transfer and obtaining patients belongings.    Nina Ivy RN  Case Management  Ext: [phone]  05/26/2020  1:41 PM

## 2020-05-26 NOTE — ASSESSMENT & PLAN NOTE
74 y/o male admitted for caudate and thalamic hemorrhagic stroke initially in [facility] due to IV antihypertensives, stepped down to floor neurology team on 05/19/20.   -- tube feeds started on 05/19/20 at 10:32 am  -- patient remains afebrile, heart rate wnl, no leukocyte count  -- tachypnea noted 22-28 rate with accessory muscle use  -- Chest congestion, gargling, coarse breath sounds and bibasilar wheezing/crackles on exam  -- CXR unremarkable for lobar consolidation; has congestive changes  -- no signs of volume over load on exam, Echo with no diastolic or systolic dysfunction,   -- tested COVID19 negative on 05/20/20  -- had mild troponin elevation 0.4 - > 0.117 -> 0.113 likely type II demand ischemia in setting of hypertensive emergency  -- lasix naive, 20 mg IV given yesterday with UOP 2.0 L  -- lower extremity doppler ultrasound negative for DVT  -- abd XR negative for ileus, or constipation  -- CTA obtained 5/22 which was negative for PE  Assessment: Respiratory distress likely due to aspiration vs atelectasis   Plan:  -- Improved  -- pulmonary toilet with IS, chest PT, suctioning, acappella  -- encourage ambulation as tolerated. Out of bed, up to chair every morning as tolerated  -- Scheduled nebulization Levalbuterol/Ipatropium Q8H; PRN Q4H for wheezing and dsypnea  -- Hold oral feeds for now. Aspiration precautions, HOB > 60 degrees  -- PEG tube feeds resume with free water flushes Q6H (350 cc Q6H at 1800 05/25/20)

## 2020-05-26 NOTE — PT/OT/SLP PROGRESS
"Physical Therapy Treatment    Patient Name:  Brett Vega   MRN:  [id number]    Recommendations:     Discharge Recommendations:  rehabilitation facility   Discharge Equipment Recommendations: 3-in-1 commode, hospital bed, shower chair, wheelchair   Barriers to discharge: Decreased caregiver support    Assessment:     Brett Vega is a 75 y.o. male admitted with a medical diagnosis of Nontraumatic intracerebral hemorrhage.  He presents with the following impairments/functional limitations:  weakness, impaired functional mobilty, impaired endurance, decreased lower extremity function, decreased upper extremity function, impaired balance, decreased coordination, impaired cardiopulmonary response to activity, abnormal tone . Pt is unsafe with functional mobility at this time due to pt requires moderate assist for bed mobility, moderate to max assist for transfers, and CGA to max assist for sitting balance due to R sided LOB with movement . Pt is motivated to progress with functional mobility.    Rehab Prognosis: Fair; patient would benefit from acute skilled PT services to address these deficits and reach maximum level of function.    Recent Surgery: * No surgery found *      Plan:     During this hospitalization, patient to be seen 4 x/week to address the identified rehab impairments via gait training, therapeutic activities, therapeutic exercises, neuromuscular re-education and progress toward the following goals:    · Plan of Care Expires:  06/14/20    Subjective   "I don't want to sit up as long today as yesterday" (pt unable to state why)    Pain/Comfort:  · Pain Rating 1: 0/10  · Pain Rating Post-Intervention 1: 0/10      Objective:     Communicated with nurse prior to session.  Patient found supine with Condom Catheter, bed alarm, PEG Tube, pulse ox (continuous), telemetry upon PT entry to room.     General Precautions: Standard, aphasia, aspiration, NPO, vision impaired   Orthopedic Precautions:N/A "   Braces: N/A     Functional Mobility:  · Bed Mobility:     · Rolling Left:  minimum assistance  · Supine to Sit: moderate assistance  · Sit to Supine: maximal assistance  · Transfers:     · Sit to Stand:  moderate assistance with hand-held assist  · Bed to bedside commode then bedside commode to bedside Chair then bedside chair to bed: maximal assistance with  hand-held assist  using  Stand Pivot  · Gait: 4 steps with L foot with HHA with R UE supported with max assist to turn to sit in the bedside chair. Pt required manual cues to advance the R LE during swing phase and for facilitation of R knee ext during stance phase.  · Pt required frequent rest periods during treatment due to SOB    AM-PAC 6 CLICK MOBILITY  Turning over in bed (including adjusting bedclothes, sheets and blankets)?: 2  Sitting down on and standing up from a chair with arms (e.g., wheelchair, bedside commode, etc.): 2  Moving from lying on back to sitting on the side of the bed?: 2  Moving to and from a bed to a chair (including a wheelchair)?: 2  Need to walk in hospital room?: 1  Climbing 3-5 steps with a railing?: 1  Basic Mobility Total Score: 10     Therapeutic Activities and Exercises:   pt transferred to bedside commode as above and had a BM. Pt stood ~ 2 min to be cleaned with max assist with manual cues at R knee to facilitate knee ext.     Patient left supine (pt remained up in bedside chair ~ 1 1/2 hours prior to return to bed as above)  with all lines intact, call button in reach and nurse notified..    GOALS:   Multidisciplinary Problems     Physical Therapy Goals        Problem: Physical Therapy Goal    Goal Priority Disciplines Outcome Goal Variances Interventions   Physical Therapy Goal     PT, PT/OT Ongoing, Progressing     Description:  PT goals until 6/3/20    1. Pt supine to sit with minimal assist-not met  2. Pt sit to supine with minimal assist-not met  3. Pt sit to stand with hemiwalker with minimal assist-not met  4. Pt  to perform gait 5ft with hemiwalker with max assist.-not met  5. Pt to transfer bed to/from bedside chair with hemiwalker with moderate assist.-not met  6. Pt to perform B LE exs in sitting or supine x 15 reps to strengthen B LE to improve functional mobility.-not met  7. Pt to sit on the EOB ~ 10 min with CGA to allow pt to perform functional activities-not met                       Time Tracking:     PT Received On: 05/26/20  PT Start Time: 0750     PT Stop Time: 0830(time added due to PT returned to transfer pt back to bed)  PT Total Time (min): 40 min     Billable Minutes: Gait Training 15 and Therapeutic Activity 25    Treatment Type: Treatment  PT/PTA: PT     PTA Visit Number: 1     Marissa Guajardo, PT  05/26/2020

## 2020-05-26 NOTE — ASSESSMENT & PLAN NOTE
Patient with increased work of breathing, tachypnea, and hypoxia.  Chest x-ray with pulmonary edema.  Patient afebrile with no leukocytosis  -IVF discontinued.   -Lasix 20 mg given with good output, but no sustained improvement noted  -BNP - 310  -Troponin mildly elevated but trending down - most likely ischemic demand  -EKG with no acute changes.  -COVID test negative  -Consult to IM - appreciate recommendations   -- pulmonary toilet with IS, chest PT, BID suction    -- Scheduled nebulization Levalbuterol/Ipatropium Q8H; PRN Q4H for wheezing and dsypnea   -- NPO. Aspiration precautions, HOB > 60 degrees   -- LE US negative for DVT   -- abd XR negative for ileus or constipation    --CTA of chest negative for PE.  Bibasilar atelectasis   Patient breathing improving 05/24, but was found to be drinking mouthwash overnight.  Will continue to monitor for signs of aspiration  Maintaining sats on 2-3L NC, no evidence of respiratory distress

## 2020-05-26 NOTE — DISCHARGE SUMMARY
[facility]  Vascular Neurology  Comprehensive Stroke Center  Discharge Summary     Summary:     Admit Date: 5/14/2020 10:38 AM    Discharge Date and Time:  05/26/2020 3:34 PM    Attending Physician: Whitney Simon MD     Discharge Provider: Caty Johnson MD    History of Present Illness: 76 yo M with no known PMHx (pt has never had PCP) presents to [facility] 05/14/20 due to finding of L ICH with IVH after presenting to [facility] with R hemiparesis and severe dysarthria.  Onset early this am.  Does have baseline vision deficit in R eye with worse central vision (noted by Neuro ICU to be legally blind).   He did have BP noted at OSH of 247/129 around 08:21 5/14/20.  Patient denies hx of headaches or intermittent vision blurring prior to admission.  Denies head trauma.  Was sleeping, on trying to move in bed could not move R side.  He otherwise denies personal or family hx of stroke, TIA, aneurysm, bleeding disorders, or clotting disorders.  No tobacco use for several years (> 20 yrs since he quit but did smoke again about 4-5 years ago for a few months).  Denies EtOH use.  Denies illicit use. Currently, pt has severe dysarthria, is somnolent, and has R-sided hemiparesis.  Denies h/a, nausea, or vision change from baseline.    Hospital Course (synopsis of major diagnoses, care, treatment, and services provided during the course of the hospital stay): 05/14/2020:  Transferred to [facility] from [facility] for L ICH/IVH from caudate and thalamus  05/15/2020:   Continues on cardene for SBP < 140, neurosurgery following.  05/16/2020:   Started back on cardene for SBP < 140 this AM  05/18/2020:  Off Cardene drip, placed on Losartan and Hydralazine. Lovenox.  05/19/2010:  /180. Still with Rt side weakness.   5/20: Stepped down overnight. Increased work of breathing, tachypnic.  IVF discontinued.  Crackles in bases and expiratory wheezes.  CXR with pulmonary edema.  Lasix given with good  output but no sustained improvement of breathing.  Currently COVID rule out.  IM following. Blood pressure elevated - changed to carvedilol 12.5mg BID, Norvasc 10mg daily, with plans to start HCTZ tomorrow.  Continuing Hydralazine until BP stable and can change to PRN only. TF on hold due to respiratory status.  Scheduled for MBSS tomorrow if able to tolerate.  5/21 COVID test negative, tachypnea persisting, hospital medicine assisting, abdominal XR and US LE pending, additional IV lasix dose ordered, patient failed MBSS, will discuss PEG placement  5/22 patient had PEG placed this morning, weaning hydralazine and increasing HCTZ. Repeat CXR pending to f/u on pulmonary edema  05/23 Started tube feeds and enteral water boluses through PEG tube today   05/24 Hypernatremic overnight.  Likely due to volume depletion.  Hospital medicine increasing free water boluses and will get a BMP this afternoon.  Urine studies pending.  Nursing staff found patient drinking mouthwash.  Will continue to monitor for signs of aspiration.  Breathing improving.  5/25: NAEON. Na 150 this AM, free water increased by HM to 500q6h. Maintaining sats on 2L NC. Last BM 5/24. Pending IPR.   5/26: NAEON. Na 143 this AM. FWF decreased to 350q6h yesterday following evening BMP. Hydralazine/Norvasc DC'd by HM. SBP  this AM, controlled with Coreg/HCTZ/Losartan. Pending IPR.     Stroke Etiology: Not Applicable/Not Ischemic Stroke    STROKE DOCUMENTATION   Acute Stroke Times   Last Known Normal Time: 0300  Symptom Onset Date: 05/14/20  Symptom Onset Time: 0700  Stroke Team Called Time: 0806  Stroke Team Arrival Time: 0809  CT Interpretation Time: 0809  Decision to Treat Time for Alteplase: (Contraindicated 2/2 ICH)  Decision to Treat Time for IR: (no LVO)     NIH Scale:     1a. Level of Consciousness: 0-->Alert, keenly responsive  1b. LOC Questions: 1-->Answers one question correctly  1c. LOC Commands: 0-->Performs both tasks correctly  2. Best  Gaze: 0-->Normal  3. Visual: 0-->No visual loss  4. Facial Palsy: 1-->Minor paralysis (flattened nasolabial fold, asymmetry on smiling)  5a. Motor Arm, Left: 0-->No drift, limb holds 90 (or 45) degrees for full 10 secs  5b. Motor Arm, Right: 3-->No effort against gravity, limb falls  6a. Motor Leg, Left: 0-->No drift, leg holds 30 degree position for full 5 secs  6b. Motor Leg, Right: 3-->No effort against gravity, leg falls to bed immediately  7. Limb Ataxia: 0-->Absent  8. Sensory: 0-->Normal, no sensory loss  9. Best Language: 0-->No aphasia, normal  10. Dysarthria: 1-->Mild-to-moderate dysarthria, patient slurs at least some words and, at worst, can be understood with some difficulty  11. Extinction and Inattention (formerly Neglect): 0-->No abnormality  Total (NIH Stroke Scale): 9      Modified [place] Score: 0  [place] Coma Scale:    ABCD2 Score:    [id number] Score:   HAS -BLED Score:   ICH Score:1  Hunt & Mccray Classification:       Assessment/Plan:     Diagnostic Results:      Brain Imaging   CT head (05/17/20)   - Left basal ganglia parenchymal hemorrhage with intraventricular extension again noted, the overall appearance is stable without evidence for significant interval detrimental change.     CT Head (05/14/20 4008)  Redemonstration of an acute parenchymal hemorrhage centered within the left basal ganglia with intraventricular extension.  Persistent ventricular prominence, stable in size when compared to prior study, in keeping with evolving hydrocephalus.    No evidence of new hemorrhage     CT Head (05/14/20 1503)  Evolving acute parenchymal hemorrhage centered left basal ganglia with intraventricular extension.    Continued intraventricular hemorrhage with mild distention lateral and 3rd ventricles concerning for component of hydrocephalus.    No significant increased volume of hemorrhage.       CT head (05/14/20 0864):  Evidence of intraparenchymal hemorrhage centered in the region of the posterior  limb of the left internal capsule with intraventricular extension as above.       Cardiac Imaging   TTE (05/15/20):  · Technically difficult portable study  · Normal left ventricular systolic function. The estimated ejection fraction is 55%.  · Normal right ventricular systolic function.  · Indeterminate left ventricular diastolic function.  · Normal central venous pressure (3 mmHg).          Interventions: None    Complications: None    Disposition: Rehab Facility    Final Active Diagnoses:    Diagnosis Date Noted POA    PRINCIPAL PROBLEM:  Nontraumatic intracerebral hemorrhage [I61.9] 05/14/2020 Yes    Hypernatremia [E87.0] 05/24/2020 No    AAA (abdominal aortic aneurysm) without rupture [I71.4] 05/22/2020 Yes    Respiratory distress [R06.03] 05/20/2020 No    Dysphagia [R13.10] 05/20/2020 Yes    Essential hypertension [I10] 05/14/2020 Yes    Vasogenic cerebral edema [G93.6] 05/14/2020 Yes    Malignant hypertension [I10] 05/14/2020 Yes    Nontraumatic cortical hemorrhage of left cerebral hemisphere [I61.1] 05/14/2020 Yes    Hemiparesis, right [G81.91] 05/14/2020 Yes    Dysarthria [R47.1] 05/14/2020 Yes    Basal ganglia hemorrhage [I61.0]  Yes      Problems Resolved During this Admission:    Diagnosis Date Noted Date Resolved POA    Hypovolemia [E86.1] 05/14/2020 05/19/2020 Yes     No new Assessment & Plan notes have been filed under this hospital service since the last note was generated.  Service: Vascular Neurology      Recommendations:     Post-discharge complication risks: Falls, Aspiration    Stroke Education given to: patient    Follow-up in Stroke Clinic in 30 days.     Discharge Plan:  Aggresive risk factor modification:  Hypertension    Follow Up:  Follow-up Information     Elyse Parham MD.    Specialty:  Family Medicine  Why:  Outpatient Services  Contact information:  [facility] [address] [facility] [id number] [phone]             Whitney Simon MD In 4 weeks.    Specialty:   Neurology  Contact information:  [address]  [facility] [address]  [phone]                   Patient Instructions:     Medications:  Patient has no home medications, please see discharge/readmit orders    Carvedilol 12.5 mg BID  Lovenox 40 mg subcutaneous daily  Hydrochlorothiazide 25mg daily  Losartan 100mg daily  Miralax 17g daily  Senna/Docusate 8.6-50 mg twice daily  Enteral water flushes 350 q6h  Silodosin 4mg daily        Caty Johnson MD  [facility]  Department of Vascular Neurology   [facility]

## 2020-05-26 NOTE — SUBJECTIVE & OBJECTIVE
Review of Systems   Constitutional: Positive for activity change and appetite change. Negative for chills, fatigue and unexpected weight change.   HENT: Positive for trouble swallowing. Negative for postnasal drip, rhinorrhea, sinus pressure and sinus pain.    Eyes: Negative for photophobia and visual disturbance.   Respiratory: Positive for shortness of breath. Negative for cough and wheezing.         SOB subjectively improving   Cardiovascular: Negative for chest pain, palpitations and leg swelling.   Gastrointestinal: Negative for abdominal distention, abdominal pain, constipation, diarrhea, nausea and vomiting.   Endocrine: Negative for polydipsia and polyuria.   Genitourinary: Negative for flank pain and hematuria.   Musculoskeletal: Negative for arthralgias, back pain, gait problem and joint swelling.   Skin: Negative for color change, pallor, rash and wound.   Neurological: Negative for dizziness, numbness and headaches.   Hematological: Negative for adenopathy. Does not bruise/bleed easily.     Objective:     Vital Signs (Most Recent):  Temp: 97.9 °F (36.6 °C) (05/26/20 0845)  Pulse: 70 (05/26/20 0845)  Resp: (!) 28 (05/26/20 0845)  BP: (!) 141/87 (05/26/20 0845)  SpO2: (!) 94 % (05/26/20 0850) Vital Signs (24h Range):  Temp:  [97.8 °F (36.6 °C)-98.4 °F (36.9 °C)] 97.9 °F (36.6 °C)  Pulse:  [60-78] 70  Resp:  [18-29] 28  SpO2:  [91 %-97 %] 94 %  BP: ()/(65-87) 141/87     Weight: 90.6 kg (199 lb 11.8 oz)  Body mass index is 28.66 kg/m².    Intake/Output Summary (Last 24 hours) at 5/26/2020 0918  Last data filed at 5/26/2020 0500  Gross per 24 hour   Intake 1990 ml   Output 1100 ml   Net 890 ml      Physical Exam   Constitutional: He is oriented to person, place, and time. No distress.   HENT:   Mouth/Throat: Oropharynx is clear and moist. No oropharyngeal exudate.   Eyes: Conjunctivae are normal. No scleral icterus.   Neck: Normal range of motion. Neck supple. No JVD present. No tracheal deviation  present. No thyromegaly present.   Cardiovascular: Normal rate, regular rhythm, normal heart sounds and intact distal pulses. Exam reveals no gallop and no friction rub.   No murmur heard.  Pulmonary/Chest: Effort normal. No stridor. No respiratory distress. He has no wheezes. He has no rales.   Coarse breath sounds   Abdominal: Soft. Bowel sounds are normal. He exhibits no distension and no mass. There is no tenderness. There is no guarding.   Musculoskeletal: He exhibits no edema, tenderness or deformity.   Lymphadenopathy:     He has no cervical adenopathy.   Neurological: He is alert and oriented to person, place, and time. He displays normal reflexes. A cranial nerve deficit is present. He exhibits normal muscle tone. Coordination normal.   Facial asymmetry; Right sided extremity weakness; strength 1/5 RUE and 0/5 RLE   Skin: Skin is warm and dry. Capillary refill takes less than 2 seconds. No rash noted. He is not diaphoretic. No erythema.     Significant Labs:   CBC:   Recent Labs   Lab 05/25/20  0514 05/26/20  0454   WBC 10.93 8.39   HGB 13.2* 11.8*   HCT 43.9 39.3*    251     CMP:   Recent Labs   Lab 05/25/20  0514 05/25/20  1554 05/26/20  0454   * 144 143  143   K 3.6 3.5 3.4*  3.4*    104 103  102   CO2 35* 36* 35*  37*   * 147* 130*  129*   BUN 35* 35* 31*  31*   CREATININE 0.9 0.8 0.8  0.8   CALCIUM 9.2 8.9 8.7  8.8   ANIONGAP 7* 4* 5*  4*   EGFRNONAA >60.0 >60.0 >60.0  >60.0       Significant Imaging: I have reviewed all pertinent imaging results/findings within the past 24 hours.

## 2020-05-26 NOTE — PLAN OF CARE
Problem: Adult Inpatient Plan of Care  Goal: Plan of Care Review  Outcome: Ongoing, Progressing     Plan of care reviewed with pt. Pt voiced understanding. Pt AAOx4. Pt denies any c/o during the shift. Pt has G tube. Tube Feedings Isosource 1.5 Des @ goal rate of 60ml. 350ml Water bolus given x2 during shift. No apparent distress noted. Fall precautions maintained. Bed in lowest position, and locked. Call light within reach and advised to call for assistance. Side rails x 2 and slip resistant socks on at this time. TM.

## 2020-05-26 NOTE — PLAN OF CARE
Problem: SLP Goal  Goal: SLP Goal  Description  Speech Language Pathology Goals  Goals expected to be met by 5/28:    1. Pt will participate in ongoing swallow assessment  2. Pt will participate in ongoing trials of puree and honey thick liquids without overt s/s of aspiration or airway compromise.  3. Pt will utilize speech strategies with min cues  4. Pt will follow complex commands with 50% accy and min cues  5. Pt will complete problem solving task with 80% accy and min cues  6. Pt will participate in swallow exercises to facilitate strengthening of the swallowing mechanism in freq. of x5-10 reps.  Outcome: Ongoing, Progressing     Pt seen by ST this date. All goals remain appropriate. Meds crushed in puree and puree for pleasure acceptable if limited to 3-4 tsp. Continue ST per POC. See note for details.     Razia Camarillo, CF-SLP  5/26/2020

## 2020-05-26 NOTE — ASSESSMENT & PLAN NOTE
NG tube in place  Patient failed MBSS, now NPO per SLP  PEG tube placed 5/22  NPO, TF running at goal

## 2020-05-26 NOTE — PLAN OF CARE
05/26/20 1546   Post-Acute Status   Post-Acute Authorization Placement   Post-Acute Placement Status Set-up Complete       [facility] Nurse can call report to [phone]. Transport setup by EMS for 5pm.  SW in contact with CM and Medical staff. Will continue to follow and offer support as needed.     Rui Cintron, NORIS  [facility]   Ext. [phone]

## 2020-05-26 NOTE — SUBJECTIVE & OBJECTIVE
Neurologic Chief Complaint:  L thalamic/caudate ICH with IVH    Subjective:     Interval History: Patient is seen for follow-up neurological assessment and treatment recommendations: NAEON. Na 143 this AM. FWF decreased to 350q6h yesterday following evening BMP. Hydralazine/Norvasc DC'd by HM. SBP  this AM, controlled with Coreg/HCTZ/Losartan. Pending IPR.     HPI, Past Medical, Family, and Social History remains the same as documented in the initial encounter.     Review of Systems   Constitutional: Negative for chills and fever.   HENT: Positive for trouble swallowing.    Eyes: Negative for visual disturbance.   Respiratory: Negative for cough, chest tightness and shortness of breath.    Cardiovascular: Negative for chest pain.   Gastrointestinal: Negative for abdominal pain, constipation and vomiting.   Skin: Negative.    Neurological: Positive for facial asymmetry, speech difficulty and weakness.   Hematological: Negative.    Psychiatric/Behavioral: Negative.  Scheduled Meds:   carvediloL  12.5 mg Oral BID    enoxaparin  40 mg Subcutaneous Daily    hydroCHLOROthiazide  25 mg Oral Daily    losartan  100 mg Oral Daily    polyethylene glycol  17 g Oral Daily    potassium, sodium phosphates  2 packet Per G Tube Once    senna-docusate 8.6-50 mg  1 tablet Oral BID    silodosin  4 mg Oral Daily     Continuous Infusions:  PRN Meds:acetaminophen, albuterol-ipratropium, hydrALAZINE, labetaloL, sodium chloride 0.9%    Objective:     Vital Signs (Most Recent):  Temp: 97.8 °F (36.6 °C) (05/26/20 0515)  Pulse: 74 (05/26/20 0715)  Resp: 18 (05/26/20 0515)  BP: 137/67 (05/26/20 0515)  SpO2: (!) 94 % (05/26/20 0515)  BP Location: Left arm    Vital Signs Range (Last 24H):  Temp:  [97.8 °F (36.6 °C)-98.6 °F (37 °C)]   Pulse:  [60-78]   Resp:  [17-29]   BP: ()/(65-82)   SpO2:  [93 %-98 %]   BP Location: Left arm    Physical Exam  Constitutional: He is oriented to person, place, and time. He  appears well-developed and well-nourished. No distress.   HENT:   Head: Normocephalic and atraumatic.   Cardiovascular: Normal rate.   Pulmonary/Chest: No tachypnea. No respiratory distress.   Abdominal: There is no tenderness.   Neurological: He is alert and oriented to person, place, and time.   Skin: Skin is warm and dry.   Vitals reviewed.    Neurological Exam:   LOC: alert  Attention Span: Good   Language: No aphasia  Articulation: Dysarthria  Orientation: Not oriented to time  Visual Fields: Full  EOM (CN III, IV, VI): Full/intact  Facial Movement (CN VII): asymmetric facial expression    Motor: Arm left  Normal 5/5  Leg left  Paresis: 4/5  Arm right  Paresis: 2/5  Leg right Paresis: 2/5  Sensation: Intact to light touch, temperature and vibration  Tone:normal     Laboratory:  CMP:   Recent Labs   Lab 05/26/20  0454   CALCIUM 8.7  8.8     143   K 3.4*  3.4*   CO2 35*  37*     102   BUN 31*  31*   CREATININE 0.8  0.8     CBC:   Recent Labs   Lab 05/26/20  0454   WBC 8.39   RBC 4.07*   HGB 11.8*   HCT 39.3*      MCV 97   MCH 29.0   MCHC 30.0*       Diagnostic Results     Brain Imaging   CT head (05/17/20)   - Left basal ganglia parenchymal hemorrhage with intraventricular extension again noted, the overall appearance is stable without evidence for significant interval detrimental change.     CT Head (05/14/20 2337)  Redemonstration of an acute parenchymal hemorrhage centered within the left basal ganglia with intraventricular extension.  Persistent ventricular prominence, stable in size when compared to prior study, in keeping with evolving hydrocephalus.    No evidence of new hemorrhage     CT Head (05/14/20 1501)  Evolving acute parenchymal hemorrhage centered left basal ganglia with intraventricular extension.    Continued intraventricular hemorrhage with mild distention lateral and 3rd ventricles concerning for component of hydrocephalus.    No significant increased volume of  hemorrhage.       CT head (05/14/20 0813):  Evidence of intraparenchymal hemorrhage centered in the region of the posterior limb of the left internal capsule with intraventricular extension as above.       Cardiac Imaging   TTE (05/15/20):  · Technically difficult portable study  · Normal left ventricular systolic function. The estimated ejection fraction is 55%.  · Normal right ventricular systolic function.  · Indeterminate left ventricular diastolic function.  · Normal central venous pressure (3 mmHg).

## 2020-05-26 NOTE — ASSESSMENT & PLAN NOTE
Likely due to volume depletion, Na 143 this AM.   Decreased enteral water boluses to 350q6h starting evening 5/25  Hospital medicine managing , appreciate recs  Goal Na 136

## 2020-05-26 NOTE — ASSESSMENT & PLAN NOTE
-- stable; continue management per neurology  -- PT/OT recommending inpatient rehab  -- case management working on placement

## 2020-05-26 NOTE — PROGRESS NOTES
[facility]  Vascular Neurology  Comprehensive Stroke Center  Progress Note    Assessment/Plan:     * Nontraumatic intracerebral hemorrhage  76 yo M with no known PMHx presents to [facility] 05/14/2020 transferred from OSH for large L thalamic and caudate ICH with intraventricular extension.  Of note, patient had SBP 240s documented at OSH.  Pt otherwise has no clear risk factors for ICH. No recent trauma.  No unexplained weight loss or B symptoms.  Admitted to Neuro ICU w/ NSGY following.  Etiology likely hypertensive, ruled out vascular malformation w/ further imaging. Repeat CT Head in 5/17: stable without evidence for significant interval detrimental change.    Antithrombotics for secondary stroke prevention:  None--ICH  Statins for secondary stroke prevention/HLD: LDL elevated--would not start statin acutely in setting of ICH, but could discuss low dose statin for reduction of stroke risk factors in stroke clinic follow up  Aggressive risk factor modification: HTN  Rehab efforts: PT/OT/SLP/PM&R, inpatient rehab, pending acceptance to facility and insurance authorization   Diagnostics ordered/pending: none  VTE prophylaxis: None: Reason for No Pharmacological VTE Prophylaxis: History of systemic or intracranial bleeding  BP parameters: ICH: SBP <140    Hypernatremia  Likely due to volume depletion, Na 143 this AM.   Decreased enteral water boluses to 350q6h starting evening 5/25  Hospital medicine managing , appreciate recs  Goal Na 136    AAA (abdominal aortic aneurysm) without rupture  Found incidentally on CT abdomen ordered for PEG placement  7x6.7 cm  Vascular surgery consulted and no acute intervention warranted at this time  Needs follow up outpatient     Dysphagia  NG tube in place  Patient failed MBSS, now NPO per SLP  PEG tube placed 5/22  NPO, TF running at goal    Respiratory distress  Patient with increased work of breathing, tachypnea, and hypoxia.  Chest x-ray with pulmonary edema.   Patient afebrile with no leukocytosis  -IVF discontinued.   -Lasix 20 mg given with good output, but no sustained improvement noted  -BNP - 310  -Troponin mildly elevated but trending down - most likely ischemic demand  -EKG with no acute changes.  -COVID test negative  -Consult to IM - appreciate recommendations   -- pulmonary toilet with IS, chest PT, BID suction    -- Scheduled nebulization Levalbuterol/Ipatropium Q8H; PRN Q4H for wheezing and dsypnea   -- NPO. Aspiration precautions, HOB > 60 degrees   -- LE US negative for DVT   -- abd XR negative for ileus or constipation    --CTA of chest negative for PE.  Bibasilar atelectasis   Patient breathing improving 05/24, but was found to be drinking mouthwash overnight.  Will continue to monitor for signs of aspiration  Maintaining sats on 2-3L NC, no evidence of respiratory distress      Dysarthria  -2/2 ICH, continue SLP eval and treat      Hemiparesis, right  -2/2 ICH, continue PT eval and treat  recommending discharge to inpatient rehab    Vasogenic cerebral edema  Area of vasogenic cerebral edema identified when reviewing brain imaging in the L basal ganglia. There is mass effect associated with it. We will continue to monitor the patients clinical exam for any worsening of symptoms which may indicate expansion of the hemorrhage or the area of the edema resulting in the clinical change. The ICH is likely 2/2 uncontrolled HTN.      Essential hypertension  -Stroke risk factor, likely etiology of ICH in this pt  - Med regiment adjusted:   Coreg 12.5mg BID   HCTZ to 25 mg Daily              Losartan 100 mg Daily  Off Norvasc/Hydralazine per , can consider Losartan increase if needed             05/14/2020:  Transferred to [facility] [place] [facility] from [facility] for L ICH/IVH from caudate and thalamus  05/15/2020:   Continues on cardene for SBP < 140, neurosurgery following.  05/16/2020:   Started back on cardene for SBP < 140 this AM  05/18/2020:  Off Cardene drip, placed on  Losartan and Hydralazine. Lovenox.  05/19/2010:  /180. Still with Rt side weakness.   5/20: Stepped down overnight. Increased work of breathing, tachypnic.  IVF discontinued.  Crackles in bases and expiratory wheezes.  CXR with pulmonary edema.  Lasix given with good output but no sustained improvement of breathing.  Currently COVID rule out.  IM following. Blood pressure elevated - changed to carvedilol 12.5mg BID, Norvasc 10mg daily, with plans to start HCTZ tomorrow.  Continuing Hydralazine until BP stable and can change to PRN only. TF on hold due to respiratory status.  Scheduled for MBSS tomorrow if able to tolerate.  5/21 COVID test negative, tachypnea persisting, hospital medicine assisting, abdominal XR and US LE pending, additional IV lasix dose ordered, patient failed MBSS, will discuss PEG placement  5/22 patient had PEG placed this morning, weaning hydralazine and increasing HCTZ. Repeat CXR pending to f/u on pulmonary edema  05/23 Started tube feeds and enteral water boluses through PEG tube today   05/24 Hypernatremic overnight.  Likely due to volume depletion.  Hospital medicine increasing free water boluses and will get a BMP this afternoon.  Urine studies pending.  Nursing staff found patient drinking mouthwash.  Will continue to monitor for signs of aspiration.  Breathing improving.  5/25: NAEON. Na 150 this AM, free water increased by HM to 500q6h. Maintaining sats on 2L NC. Last BM 5/24. Pending IPR.   5/26: NAEON. Na 143 this AM. FWF decreased to 350q6h yesterday following evening BMP. Hydralazine/Norvasc DC'd by HM. SBP  this AM, controlled with Coreg/HCTZ/Losartan. Pending IPR.     STROKE DOCUMENTATION   Acute Stroke Times   Last Known Normal Time: 0300  Symptom Onset Date: 05/14/20  Symptom Onset Time: 0700  Stroke Team Called Time: 0806  Stroke Team Arrival Time: 0809  CT Interpretation Time: 0809  Decision to Treat Time for Alteplase: (Contraindicated 2/2 ICH)  Decision to  Treat Time for IR: (no LVO)    NIH Scale:      1a. Level of Consciousness: 0-->Alert, keenly responsive  1b. LOC Questions: 1-->Answers one question correctly  1c. LOC Commands: 0-->Performs both tasks correctly  2. Best Gaze: 0-->Normal  3. Visual: 0-->No visual loss  4. Facial Palsy: 1-->Minor paralysis (flattened nasolabial fold, asymmetry on smiling)  5a. Motor Arm, Left: 0-->No drift, limb holds 90 (or 45) degrees for full 10 secs  5b. Motor Arm, Right: 3-->No effort against gravity, limb falls  6a. Motor Leg, Left: 0-->No drift, leg holds 30 degree position for full 5 secs  6b. Motor Leg, Right: 3-->No effort against gravity, leg falls to bed immediately  7. Limb Ataxia: 0-->Absent  8. Sensory: 0-->Normal, no sensory loss  9. Best Language: 0-->No aphasia, normal  10. Dysarthria: 1-->Mild-to-moderate dysarthria, patient slurs at least some words and, at worst, can be understood with some difficulty  11. Extinction and Inattention (formerly Neglect): 0-->No abnormality  Total (NIH Stroke Scale): 9     Modified [place] Score: 0  [place] Coma Scale:    ABCD2 Score:    [id number] Score:   HAS -BLED Score:   ICH Score:1  Hunt & Mccray Classification:      Hemorrhagic change of an Ischemic Stroke: Does this patient have an ischemic stroke with hemorrhagic changes? No     Neurologic Chief Complaint:  L thalamic/caudate ICH with IVH    Subjective:     Interval History: Patient is seen for follow-up neurological assessment and treatment recommendations: NAEON. Na 143 this AM. FWF decreased to 350q6h yesterday following evening BMP. Hydralazine/Norvasc DC'd by HM. SBP  this AM, controlled with Coreg/HCTZ/Losartan. Pending IPR.     HPI, Past Medical, Family, and Social History remains the same as documented in the initial encounter.     Review of Systems   Constitutional: Negative for chills and fever.   HENT: Positive for trouble swallowing.    Eyes: Negative for visual disturbance.   Respiratory: Negative  for cough, chest tightness and shortness of breath.    Cardiovascular: Negative for chest pain.   Gastrointestinal: Negative for abdominal pain, constipation and vomiting.   Skin: Negative.    Neurological: Positive for facial asymmetry, speech difficulty and weakness.   Hematological: Negative.    Psychiatric/Behavioral: Negative.  Scheduled Meds:   carvediloL  12.5 mg Oral BID    enoxaparin  40 mg Subcutaneous Daily    hydroCHLOROthiazide  25 mg Oral Daily    losartan  100 mg Oral Daily    polyethylene glycol  17 g Oral Daily    potassium, sodium phosphates  2 packet Per G Tube Once    senna-docusate 8.6-50 mg  1 tablet Oral BID    silodosin  4 mg Oral Daily     Continuous Infusions:  PRN Meds:acetaminophen, albuterol-ipratropium, hydrALAZINE, labetaloL, sodium chloride 0.9%    Objective:     Vital Signs (Most Recent):  Temp: 97.8 °F (36.6 °C) (05/26/20 0515)  Pulse: 74 (05/26/20 0715)  Resp: 18 (05/26/20 0515)  BP: 137/67 (05/26/20 0515)  SpO2: (!) 94 % (05/26/20 0515)  BP Location: Left arm    Vital Signs Range (Last 24H):  Temp:  [97.8 °F (36.6 °C)-98.6 °F (37 °C)]   Pulse:  [60-78]   Resp:  [17-29]   BP: ()/(65-82)   SpO2:  [93 %-98 %]   BP Location: Left arm    Physical Exam  Constitutional: He is oriented to person, place, and time. He appears well-developed and well-nourished. No distress.   HENT:   Head: Normocephalic and atraumatic.   Cardiovascular: Normal rate.   Pulmonary/Chest: No tachypnea. No respiratory distress.   Abdominal: There is no tenderness.   Neurological: He is alert and oriented to person, place, and time.   Skin: Skin is warm and dry.   Vitals reviewed.    Neurological Exam:   LOC: alert  Attention Span: Good   Language: No aphasia  Articulation: Dysarthria  Orientation: Not oriented to time  Visual Fields: Full  EOM (CN III, IV, VI): Full/intact  Facial Movement (CN VII): asymmetric facial expression    Motor: Arm left  Normal 5/5  Leg left  Paresis: 4/5  Arm right   Paresis: 2/5  Leg right Paresis: 2/5  Sensation: Intact to light touch, temperature and vibration  Tone:normal     Laboratory:  CMP:   Recent Labs   Lab 05/26/20  0454   CALCIUM 8.7  8.8     143   K 3.4*  3.4*   CO2 35*  37*     102   BUN 31*  31*   CREATININE 0.8  0.8     CBC:   Recent Labs   Lab 05/26/20  0454   WBC 8.39   RBC 4.07*   HGB 11.8*   HCT 39.3*      MCV 97   MCH 29.0   MCHC 30.0*       Diagnostic Results     Brain Imaging   CT head (05/17/20)   - Left basal ganglia parenchymal hemorrhage with intraventricular extension again noted, the overall appearance is stable without evidence for significant interval detrimental change.     CT Head (05/14/20 6567)  Redemonstration of an acute parenchymal hemorrhage centered within the left basal ganglia with intraventricular extension.  Persistent ventricular prominence, stable in size when compared to prior study, in keeping with evolving hydrocephalus.    No evidence of new hemorrhage     CT Head (05/14/20 1501)  Evolving acute parenchymal hemorrhage centered left basal ganglia with intraventricular extension.    Continued intraventricular hemorrhage with mild distention lateral and 3rd ventricles concerning for component of hydrocephalus.    No significant increased volume of hemorrhage.       CT head (05/14/20 0856):  Evidence of intraparenchymal hemorrhage centered in the region of the posterior limb of the left internal capsule with intraventricular extension as above.       Cardiac Imaging   TTE (05/15/20):  · Technically difficult portable study  · Normal left ventricular systolic function. The estimated ejection fraction is 55%.  · Normal right ventricular systolic function.  · Indeterminate left ventricular diastolic function.  · Normal central venous pressure (3 mmHg).         Caty Johnson MD  Comprehensive Stroke Center  Department of Vascular Neurology   [facility]

## 2020-05-26 NOTE — PROGRESS NOTES
"[facility]  Adult Nutrition  Progress Note    SUMMARY       Recommendations     1. Continue Isosource 1.5 @ goal rate 60ml/hr (provides 2160kcal, 98g pro, 1094ml free water). Additional 250ml water flush Q6hrs or per MD. 2. Hold for residuals >500ml.  Goals: 1. Pt's intake to meet % EEN and EPN x 7 days.  Nutrition Goal Status: progressing towards goal  Communication of RD Recs: (POC)    Reason for Assessment    Reason For Assessment: RD follow-up  Diagnosis: hemorrhage  Relevant Medical History: None  Interdisciplinary Rounds: did not attend  General Information Comments: Pt s/p PEG 5/22 is tolerating TF at goal well. Completed NFPE today: pt has mild muscle wasting but overall appears nourished. Will monitor.  Nutrition Discharge Planning: Pt to discharge on TF with Isosource 1.5 @60ml/hr x 24 hrs.    Nutrition Risk Screen    Nutrition Risk Screen: tube feeding or parenteral nutrition    Nutrition/Diet History    Spiritual, Cultural Beliefs, Uatsdin Practices, Values that Affect Care: no  Food Allergies: NKFA  Factors Affecting Nutritional Intake: NPO    Anthropometrics    Temp: 97.9 °F (36.6 °C)  Height: 5' 10" (177.8 cm)  Height (inches): 70 in  Weight Method: Bed Scale  Weight: 90.6 kg (199 lb 11.8 oz)  Weight (lb): 199.74 lb  Ideal Body Weight (IBW), Male: 166 lb  % Ideal Body Weight, Male (lb): 126.51 %  BMI (Calculated): 28.7  BMI Grade: 30 - 34.9- obesity - grade I       Lab/Procedures/Meds    Pertinent Labs Reviewed: reviewed  Pertinent Labs Comments: K 3.4, CO2 35, BUN 31, Glu 130  Pertinent Medications Reviewed: reviewed  Pertinent Medications Comments: senna    Estimated/Assessed Needs    Weight Used For Calorie Calculations: 95.4 kg (210 lb 5.1 oz)  Energy Calorie Requirements (kcal): 2131 kcal(PAL 1.25)  Energy Need Method: [place]-St Jeor(no AF)  Protein Requirements: 98-113g/day  Weight Used For Protein Calculations: 75.4 kg (166 lb 3.6 oz)(1.5-2.0 g/kg of IBW)   "   Estimated Fluid Requirement Method: RDA Method(or per MD)  RDA Method (mL): 2131         Nutrition Prescription Ordered    Current Diet Order: NPO  Nutrition Order Comments: none  Current Nutrition Support Formula Ordered: Isosource 1.5  Current Nutrition Support Rate Ordered: 60 (ml)  Current Nutrition Support Frequency Ordered: ml/hr    Evaluation of Received Nutrient/Fluid Intake    IV Fluid (mL): 0  I/O: +675ml since admission  Energy Calories Required: meeting needs  Protein Required: meeting needs  Fluid Required: meeting needs  Comments: LBM 5/25  Tolerance: tolerating  % Intake of Estimated Energy Needs: 75 - 100 %  % Meal Intake: NPO    Nutrition Risk    Level of Risk/Frequency of Follow-up: low     Assessment and Plan    Nutrition Problem  Swallowing difficulty    Related to (etiology):   Cerebral hemorrhage    Signs and Symptoms (as evidenced by):   Pt s/p PEG on enteral feeding.    Interventions(treatment strategy):  Collaboration of care with providers.  Enteral nutrition: Isosource 1.5 @ 60ml/hr x 24 hrs.    Nutrition Diagnosis Status:   Continues       Monitor and Evaluation    Food and Nutrient Intake: energy intake, enteral nutrition intake  Food and Nutrient Adminstration: enteral and parenteral nutrition administration  Anthropometric Measurements: weight, weight change, body mass index  Biochemical Data, Medical Tests and Procedures: electrolyte and renal panel, gastrointestinal profile, glucose/endocrine profile, inflammatory profile, lipid profile  Nutrition-Focused Physical Findings: overall appearance     Malnutrition Assessment                 Orbital Region (Subcutaneous Fat Loss): well nourished  Upper Arm Region (Subcutaneous Fat Loss): mild depletion  Thoracic and Lumbar Region: well nourished   [place] Region (Muscle Loss): mild depletion  Clavicle Bone Region (Muscle Loss): well nourished  Clavicle and Acromion Bone Region (Muscle Loss): well nourished  Dorsal Hand (Muscle Loss): mild  depletion  Patellar Region (Muscle Loss): well nourished  Anterior Thigh Region (Muscle Loss): well nourished  Posterior Calf Region (Muscle Loss): well nourished                 Nutrition Follow-Up    RD Follow-up?: Yes

## 2020-05-26 NOTE — PLAN OF CARE
05/26/20 0933   Post-Acute Status   Post-Acute Authorization Placement   Post-Acute Placement Status Additional Clinical Requested   Discharge Delays None known at this time     CM spoke with Yoselin in [place] [facility], she will review clinicals and call me back shortly. This is wife's preferred facility. Patient is also under review at [facility].     Nina Ivy RN  Case Management  Ext: [phone]  05/26/2020  9:35 AM

## 2020-05-26 NOTE — NURSING
[facility] [place] [facility] No other issues at time of entry.

## 2020-05-26 NOTE — PLAN OF CARE
Goals remain appropriate. DELLA Evans 5/26/2020   Problem: Occupational Therapy Goal  Goal: Occupational Therapy Goal  Description  Goals to be met by: 5/29/2020     Patient will increase functional independence with ADLs by performing:    Grooming while standing with Moderate Assistance.  LB dressing with moderate assistance.  Toileting from bedside commode with Moderate Assistance for hygiene and clothing management.   Sitting at edge of bed x10 minutes with Minimal Assistance in preparation of functional seated grooming tasks. MET  *revised: with CGA x10 min and midline orientation maintained  Stand pivot transfers with Moderate Assistance. Met   *revised with min(A)  Toilet transfer to bedside commode with Moderate Assistance.       Outcome: Ongoing, Progressing

## 2020-05-26 NOTE — ASSESSMENT & PLAN NOTE
74 yo M with no known PMHx presents to [facility] 05/14/2020 transferred from OSH for large L thalamic and caudate ICH with intraventricular extension.  Of note, patient had SBP 240s documented at OSH.  Pt otherwise has no clear risk factors for ICH. No recent trauma.  No unexplained weight loss or B symptoms.  Admitted to Neuro ICU w/ NSGY following.  Etiology likely hypertensive, ruled out vascular malformation w/ further imaging. Repeat CT Head in 5/17: stable without evidence for significant interval detrimental change.    Antithrombotics for secondary stroke prevention:  None--ICH  Statins for secondary stroke prevention/HLD: LDL elevated--would not start statin acutely in setting of ICH, but could discuss low dose statin for reduction of stroke risk factors in stroke clinic follow up  Aggressive risk factor modification: HTN  Rehab efforts: PT/OT/SLP/PM&R, inpatient rehab, pending acceptance to facility and insurance authorization   Diagnostics ordered/pending: none  VTE prophylaxis: None: Reason for No Pharmacological VTE Prophylaxis: History of systemic or intracranial bleeding  BP parameters: ICH: SBP <140

## 2020-05-26 NOTE — PROGRESS NOTES
[facility]  Physical Medicine & Rehab  Progress Note    Patient Name: Brett Vega  MRN: [id number]  Admission Date: 5/14/2020  Length of Stay: 12 days  Attending Physician: Whitney Simon MD    Subjective:     Principal Problem:Nontraumatic intracerebral hemorrhage    Hospital Course:   5/15/20: Evaluated by PT & OT. Bed mobility maxA- totalA x 2 ppl. LBD totalA.    5/19/20: participated w/ PT & OT. Bed mobility maxA- maxA x 2 pl. Sit tos tand maxA x 2 ppl. pt performed pre gait stepping forward and back with L LE. Feeding & Grooming Savita.    5/25/20: Participated w/ PT & OT. Bed mobility mod- maxA. Sit to stand maxA. performed pre gait stepping activities x 3 sets of 5-8 reps. Grooming Savita. LBD & UBD maxA    Interval History 5/26/2020:  Patient is seen for follow-up rehab evaluation and recommendations: Participating with therapy.Tolerating feeds.     HPI, Past Medical, Family, and Social History remains the same as documented in the initial encounter.    Scheduled Medications:    carvediloL  12.5 mg Oral BID    enoxaparin  40 mg Subcutaneous Daily    hydroCHLOROthiazide  25 mg Oral Daily    losartan  100 mg Oral Daily    polyethylene glycol  17 g Oral Daily    senna-docusate 8.6-50 mg  1 tablet Oral BID    silodosin  4 mg Oral Daily       Diagnostic Results: Labs: Reviewed    PRN Medications: acetaminophen, albuterol-ipratropium, hydrALAZINE, labetaloL, sodium chloride 0.9%    Review of Systems   Constitutional: Positive for activity change. Negative for fatigue and fever.   HENT: Positive for trouble swallowing. Negative for voice change.    Genitourinary: Negative for difficulty urinating and flank pain.   Musculoskeletal: Positive for gait problem. Negative for back pain.   Neurological: Positive for speech difficulty and weakness (R sided).   Psychiatric/Behavioral: Positive for confusion. Negative for agitation and behavioral problems.     Objective:     Vital Signs (Most  Recent):  Temp: 97.9 °F (36.6 °C) (05/26/20 0845)  Pulse: 62 (05/26/20 1124)  Resp: (!) 31 (05/26/20 1000)  BP: 139/85 (05/26/20 1000)  SpO2: 95 % (05/26/20 1000)    Vital Signs (24h Range):  Temp:  [97.8 °F (36.6 °C)-98.4 °F (36.9 °C)] 97.9 °F (36.6 °C)  Pulse:  [60-78] 62  Resp:  [18-31] 31  SpO2:  [91 %-97 %] 95 %  BP: ()/(65-87) 139/85     Physical Exam   Constitutional: He appears well-developed and well-nourished.   HENT:   Head: Normocephalic and atraumatic.   Eyes: Pupils are equal, round, and reactive to light. EOM are normal.   Pulmonary/Chest: Effort normal. No respiratory distress.   Abdominal:   PEG   Musculoskeletal:   - R sided weakness   Neurological: He is alert.   - following commands  - confusion present  - dysarthria    Skin: Skin is warm and dry.   Psychiatric: His behavior is normal.   Nursing note and vitals reviewed.      Assessment/Plan:      * Nontraumatic intracerebral hemorrhage  - Per [facility] Neurology likely etiology of ICH is HTN.     - Related to prolonged/acute hospital course.     Recommendations  -  Encourage mobility, OOB in chair at least 3 hours per day, and early ambulation as appropriate  -  PT/OT evaluate and treat  -  Pain management  -  Monitor for and prevent skin breakdown and pressure ulcers  · Early mobility, repositioning/weight shifting every 20-30 minutes when sitting, turn patient every 2 hours, proper mattress/overlay and chair cushioning, pressure relief/heel protector boots  -  DVT prophylaxis    -  Reviewed discharge options (IP rehab, SNF, HH therapy, and OP therapy)    Hypernatremia  - Na improving goal 136, 143 this AM 5/26    Dysphagia  - s/p PEG tolerating feeds, NPO    Hemiparesis, right  - PT and OT eval and treat       Recommend Inpatient Rehab.      Hanna Bailey NP  Department of Physical Medicine & Rehab   [facility]

## 2020-05-26 NOTE — PROVIDER TRANSFER
[facility] Transfer Orders        Admit to: [facility]    Diagnoses:   Active Hospital Problems    Diagnosis  POA    *Nontraumatic intracerebral hemorrhage [I61.9]  Yes    Hypernatremia [E87.0]  No    AAA (abdominal aortic aneurysm) without rupture [I71.4]  Yes    Respiratory distress [R06.03]  No    Dysphagia [R13.10]  Yes    Essential hypertension [I10]  Yes    Vasogenic cerebral edema [G93.6]  Yes    Malignant hypertension [I10]  Yes    Nontraumatic cortical hemorrhage of left cerebral hemisphere [I61.1]  Yes    Hemiparesis, right [G81.91]  Yes    Dysarthria [R47.1]  Yes    Basal ganglia hemorrhage [I61.0]  Yes      Resolved Hospital Problems    Diagnosis Date Resolved POA    Hypovolemia [E86.1] 05/19/2020 Yes     Allergies: Review of patient's allergies indicates:  No Known Allergies    Code Status: Full    Vitals: Routine       Diet: tube feedings: Continuous Isosource 1.5 Des  at 60 mL per hour for 24 hours per day    Activity: Activity as tolerated    Nursing Precautions: Aspiration     Bed/Surface: Pressure Redistribution    Consults: PT to evaluate and treat- 4-6 times a week, OT to evaluate and treat- 4-6 times a week and ST to evaluate and treat- 4-6 times a week    Oxygen: 2 liters/min via nasal cannula    Dialysis: Patient is not on dialysis.     Labs:   Pending Diagnostic Studies:     None        Imaging: None    Miscellaneous Care:   Routine Skin for Bedridden Patients:  Apply moisture barrier cream to all    IV Access: Peripheral     Medications: Discontinue all previous medication orders, if any. See new list below.    Please see discharge readmit orders for [facility]    Follow up:   Follow-up Information     Elyse Parham MD.    Specialty:  Family Medicine  Why:  Outpatient Services  Contact information:  [place] [address]  [phone]             Whitney Simon MD In 4 weeks.    Specialty:  Neurology  Contact  information:  [address]  [facility] [address]  [phone]

## 2020-05-26 NOTE — PT/OT/SLP PROGRESS
Occupational Therapy   Treatment    Name: Brett Vega  MRN: [id number]  Admitting Diagnosis:  Nontraumatic intracerebral hemorrhage       Recommendations:     Discharge Recommendations: rehabilitation facility  Discharge Equipment Recommendations:  (TBD with progression in care)  Barriers to discharge:  (level of skilled assistance required)    Assessment:     Brett Vega is a 75 y.o. male with a medical diagnosis of Nontraumatic intracerebral hemorrhage.  He presents with R hemiparesis and dysarthria. He demo cont gains towards OT goals at this time. Cont to rec rehab at post acute level of care. Performance deficits affecting function are weakness, impaired endurance, impaired self care skills, impaired functional mobilty, gait instability, impaired balance, decreased upper extremity function, decreased lower extremity function, visual deficits, impaired coordination, decreased ROM, decreased safety awareness, impaired cognition.     Rehab Prognosis:  Good; patient would benefit from acute skilled OT services to address these deficits and reach maximum level of function.       Plan:     Patient to be seen 4 x/week to address the above listed problems via self-care/home management, therapeutic activities, therapeutic exercises, neuromuscular re-education, cognitive retraining  · Plan of Care Expires: 06/13/20  · Plan of Care Reviewed with: patient    Subjective     Pain/Comfort:  · Pain Rating 1: 0/10  · Pain Rating Post-Intervention 1: 0/10    Objective:     Communicated with: RN prior to session.  Patient found HOB elevated with Condom Catheter, PEG Tube, telemetry, bed alarm, peripheral IV, pulse ox (continuous) upon OT entry to room.    General Precautions: Standard, aspiration, fall, vision impaired, NPO   Orthopedic Precautions:N/A     Occupational Performance:     Bed Mobility:    · Patient completed Rolling/Turning to Left with  maximal assistance  · Patient completed Supine to Sit with maximal  assistance     Functional Mobility/Transfers:  · Patient completed Bed <> Chair Transfer using Step Transfer technique with maximal assistance and of 2 persons with no assistive device  · Patient completed Toilet Transfer Step Transfer technique with maximal assistance and of 2 persons to  bedside commode    Activities of Daily Living:  · Feeding:  NPO; PEG    · Grooming: maximal assistance sitting up in chair; shaving with L hand.   · Upper Body Dressing: maximal assistance rashmi technique  · Toileting: total assistance perineal care from bedside commode    [facility] 6 Click ADL: 11    Treatment & Education:  -Pt alert and agreeable to therapy session; re edu on therapy role in care  -EOB with min(A) for static postural control and for R side attention with R UE in extended arm weight bearing  -bedside commode obtained for patient use  *facilitation of support for R UE with functional transfers; edu on handling and positioning with need for carry over with bilateral/bi functional task  -up to chair for ADLs as stated  *R UE in elevation and extension with abduction and open palm  -Communication board updated; questions/concerns addressed within OT scope of practice      Patient left up in chair with all lines intact, call button in reach, chair alarm on and RN notifiedEducation:      GOALS:   Multidisciplinary Problems     Occupational Therapy Goals        Problem: Occupational Therapy Goal    Goal Priority Disciplines Outcome Interventions   Occupational Therapy Goal     OT, PT/OT Ongoing, Progressing    Description:  Goals to be met by: 5/29/2020     Patient will increase functional independence with ADLs by performing:    Grooming while standing with Moderate Assistance.  LB dressing with moderate assistance.  Toileting from bedside commode with Moderate Assistance for hygiene and clothing management.   Sitting at edge of bed x10 minutes with Minimal Assistance in preparation of functional seated grooming tasks.  MET  *revised: with CGA x10 min and midline orientation maintained  Stand pivot transfers with Moderate Assistance. Met   *revised with min(A)  Toilet transfer to bedside commode with Moderate Assistance.                        Time Tracking:     OT Date of Treatment: 05/26/20  OT Start Time: 0803  OT Stop Time: 0842  OT Total Time (min): 39 min    Billable Minutes:Self Care/Home Management 38    DELLA Evans  5/26/2020

## 2020-05-26 NOTE — PROGRESS NOTES
[facility] Medicine  Progress Note    Patient Name: Brett Vega  MRN: [id number]  Patient Class: IP- Inpatient   Admission Date: 5/14/2020  Length of Stay: 12 days  Attending Physician: Whitney Simon MD  Primary Care Provider: Elyse Parham MD        Subjective:     Principal Problem:Nontraumatic intracerebral hemorrhage        HPI:  Mr. Vega is a 74 yo male with no significant past medical history transferred from [facility] after he presented there for new onset of dense right hemiparesis and marked dysarthria. Onset unclear but likely around 3am on 05/14/20. No recent illness. Legally blind. BP on arrival to outside facility 247/129. Cardene ggt initiated. 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 Telemedicine-stroke done by Neurology and patient transferred to [facility] on 05/15/20 and admitted under [facility] service. Upon arrival, pt awake, alert, oriented, following commands. No movement against gravity on the right side. On Cardene ggt.      Hospital Course: 05/15/2020: SBP < 160, add hydralazine, PT/OT, evd watch  5/16/2020: increase Hydralazine, add losartan, start tube feeds, follow up CTH in AM, add silodosin and place cordoav catheter, replace electrolytes PRN  5/17/2020: CT head stable, advance TF, add labetalol 100 q8hr, d/c cardene gtt  5/18/2020: required one prn dose of labetalol at 3 AM, scheduled hydralizine changed to q6 hrs, trial of puree consistency today although I do not feel patients swallowing is at point where can eat unattended or large quantities of food  5/19/20 Losartan increased to 100, hydralazine increased to 75. Resuming TF. Modified barium swallow test ordered. Nebs q6hr PRN. Transferred to Stroke Floor.   05/20/20: Patient on NG tube feeds, tachypnea and complaint of mild intermittent cough and associated dyspnea.     Hospital Medicine team consulted on  05/20/20 for respiratory distress.  Patient remains afebrile, heart rate wnl, no leukocyte count. tachypnea noted 22-28 rate with accessory muscle use. Chest congestion, gargling, coarse breath sounds and bibasilar wheezing on exam. CXR unremarkable for lobar consolidation, however reveals pulmonary congestive changes. no signs of volume over load on exam, Echo with no diastolic or systolic dysfunction. Primary team gave 20 IV lasix with UOP 2 L in 24 hours. COVID19 negative 05/20/20 05/21/20: Patient seen and examined at the bedside. Continues to be verbally responsive with mild dysarthria and right sided upper and lower extremity weakness. Oral and tube feeds on hold. Tachyapnea, with accessary muscles use but notes improvement in breathing compared to yesterday. Requiring 2.0 L nasal canula to maintain sats 95%. Scheduled for modified barium swallow study today.   05/22/20: No apparent concerns. Patient failed the swallowing study and today is s/p PEG tube placement per IR, patient tolerated the procedure well. Compared to yesterday, respiratory status is better. Remains NPO. RR 20-25. US LE doppler is negative for DVT. CXR unremarkable for ileus. CT abd w/ contrast consistent with infrarenal AAA 7 x 6.7 cm. Receiving scheduled neutralization. Lasix 20 mg given yesterday with UOP 2.225 L.   05/23/20: Pt states SOB is improved today. Vascular surgery recommending outpatient follow up for infrarenal AAA. Receiving scheduled nebs, patient encouraged to use IS. UOP 1.4 L yesterday. CTA negative for acute PE.   05/24/20: Reports no concerns. PEG tube feeds ongoing. Afebrile. Patient RR 21-25, however, oxygen requirements decreased to 2.0 L. Lasix 20 mg er G tube,  cc/24 hours. Sodium in the am is 153, free water deficit 4.2 L. Patient has been getting 250 cc fluids via PEG tube every 6 hours, will increase to 500 cc every 6 hours and repeat BMP at 1800.   (05/25/20): Patient seen and examined at the  bedside. No apparent concerns. Respiratory status improved. Wean off oxygen as tolerated. RR 17-20 per minute. No lasix given yesterday. UOP 1.45 L/24 hours. PEG tube feeds ongoing at goal 60 mL/hr with free water flushes increased to 750 cc every 6 hours. Patient had a bowel movement yesterday per the nursing staff.   Interval History (05/26/20): Patient seen and examined at bedside. Respiratory status continues to improve, with no new concerns or symptoms noted. Peg tube feeds continue at 60 mL/hr with free water flushes at 350 cc every 6 hours. Patient medically ready for transfer/discharge. We will continue to follow labs peripherally while patient remains in hospital. Please call with any further questions or concerns.      Overview/Hospital Course:  No notes on file      Review of Systems   Constitutional: Positive for activity change and appetite change. Negative for chills, fatigue and unexpected weight change.   HENT: Positive for trouble swallowing. Negative for postnasal drip, rhinorrhea, sinus pressure and sinus pain.    Eyes: Negative for photophobia and visual disturbance.   Respiratory: Positive for shortness of breath. Negative for cough and wheezing.         SOB subjectively improving   Cardiovascular: Negative for chest pain, palpitations and leg swelling.   Gastrointestinal: Negative for abdominal distention, abdominal pain, constipation, diarrhea, nausea and vomiting.   Endocrine: Negative for polydipsia and polyuria.   Genitourinary: Negative for flank pain and hematuria.   Musculoskeletal: Negative for arthralgias, back pain, gait problem and joint swelling.   Skin: Negative for color change, pallor, rash and wound.   Neurological: Negative for dizziness, numbness and headaches.   Hematological: Negative for adenopathy. Does not bruise/bleed easily.     Objective:     Vital Signs (Most Recent):  Temp: 97.9 °F (36.6 °C) (05/26/20 0845)  Pulse: 70 (05/26/20 0845)  Resp: (!) 28 (05/26/20  0845)  BP: (!) 141/87 (05/26/20 0845)  SpO2: (!) 94 % (05/26/20 0850) Vital Signs (24h Range):  Temp:  [97.8 °F (36.6 °C)-98.4 °F (36.9 °C)] 97.9 °F (36.6 °C)  Pulse:  [60-78] 70  Resp:  [18-29] 28  SpO2:  [91 %-97 %] 94 %  BP: ()/(65-87) 141/87     Weight: 90.6 kg (199 lb 11.8 oz)  Body mass index is 28.66 kg/m².    Intake/Output Summary (Last 24 hours) at 5/26/2020 0918  Last data filed at 5/26/2020 0500  Gross per 24 hour   Intake 1990 ml   Output 1100 ml   Net 890 ml      Physical Exam   Constitutional: He is oriented to person, place, and time. No distress.   HENT:   Mouth/Throat: Oropharynx is clear and moist. No oropharyngeal exudate.   Eyes: Conjunctivae are normal. No scleral icterus.   Neck: Normal range of motion. Neck supple. No JVD present. No tracheal deviation present. No thyromegaly present.   Cardiovascular: Normal rate, regular rhythm, normal heart sounds and intact distal pulses. Exam reveals no gallop and no friction rub.   No murmur heard.  Pulmonary/Chest: Effort normal. No stridor. No respiratory distress. He has no wheezes. He has no rales.   Coarse breath sounds   Abdominal: Soft. Bowel sounds are normal. He exhibits no distension and no mass. There is no tenderness. There is no guarding.   Musculoskeletal: He exhibits no edema, tenderness or deformity.   Lymphadenopathy:     He has no cervical adenopathy.   Neurological: He is alert and oriented to person, place, and time. He displays normal reflexes. A cranial nerve deficit is present. He exhibits normal muscle tone. Coordination normal.   Facial asymmetry; Right sided extremity weakness; strength 1/5 RUE and 0/5 RLE   Skin: Skin is warm and dry. Capillary refill takes less than 2 seconds. No rash noted. He is not diaphoretic. No erythema.     Significant Labs:   CBC:   Recent Labs   Lab 05/25/20  0514 05/26/20  0454   WBC 10.93 8.39   HGB 13.2* 11.8*   HCT 43.9 39.3*    251     CMP:   Recent Labs   Lab 05/25/20  05  05/25/20  1554 05/26/20  0454   * 144 143  143   K 3.6 3.5 3.4*  3.4*    104 103  102   CO2 35* 36* 35*  37*   * 147* 130*  129*   BUN 35* 35* 31*  31*   CREATININE 0.9 0.8 0.8  0.8   CALCIUM 9.2 8.9 8.7  8.8   ANIONGAP 7* 4* 5*  4*   EGFRNONAA >60.0 >60.0 >60.0  >60.0       Significant Imaging: I have reviewed all pertinent imaging results/findings within the past 24 hours.      Assessment/Plan:      * Nontraumatic intracerebral hemorrhage  -- stable; continue management per neurology  -- PT/OT recommending inpatient rehab  -- case management working on placement    Hypernatremia  -- Hypovolemic hyponatremia  -- sodium levels 143, free water deficit 2.8 L (05/26)  -- patient has been getting 250 cc/Q6H free water flushes, increased free water flushes to 500 mL every 6 hours, and briefly 750 mL every 6 hours, now at 350 mL every 6 hours with sodium responding appropriately  -- sodium levels 143 @ 0500 on 05/25/20, free water deficit calculated 3.6 L  Plan:  -- free water flushes decreased to 350 mL every 6 hours starting 1800 (05/25/20)- if patient continues to trend down beyond goal sodium level of 136, decrease free water flushes to 200 mL every 6 hours  -- No oral intake due to aspiration risk  -- Repeat BMPs scheduled daily- next on 5/27 @ 0300   -- Correction rate not to exceed 12 meq in 24 hours period      AAA (abdominal aortic aneurysm) without rupture  -- incidental finding when obtaining CT Abdomen for PEG tube placement  -- No evidence of rupture per imaging scan, patient is HDS  -- Vascular Surgery Consult for repair-->rec'ed outpatient followup (see their note dated 5/22)    Respiratory distress  74 y/o male admitted for caudate and thalamic hemorrhagic stroke initially in [facility] due to IV antihypertensives, stepped down to floor neurology team on 05/19/20.   -- tube feeds started on 05/19/20 at 10:32 am  -- patient remains afebrile, heart rate wnl, no leukocyte count  --  tachypnea noted 22-28 rate with accessory muscle use  -- Chest congestion, gargling, coarse breath sounds and bibasilar wheezing/crackles on exam  -- CXR unremarkable for lobar consolidation; has congestive changes  -- no signs of volume over load on exam, Echo with no diastolic or systolic dysfunction,   -- tested COVID19 negative on 05/20/20  -- had mild troponin elevation 0.4 - > 0.117 -> 0.113 likely type II demand ischemia in setting of hypertensive emergency  -- lasix naive, 20 mg IV given yesterday with UOP 2.0 L  -- lower extremity doppler ultrasound negative for DVT  -- abd XR negative for ileus, or constipation  -- CTA obtained 5/22 which was negative for PE  Assessment: Respiratory distress likely due to aspiration vs atelectasis   Plan:  -- Improved  -- pulmonary toilet with IS, chest PT, suctioning, acappella  -- encourage ambulation as tolerated. Out of bed, up to chair every morning as tolerated  -- Scheduled nebulization Levalbuterol/Ipatropium Q8H; PRN Q4H for wheezing and dsypnea  -- Hold oral feeds for now. Aspiration precautions, HOB > 60 degrees  -- PEG tube feeds resume with free water flushes Q6H (350 cc Q6H at 1800 05/25/20)    Essential hypertension  -- controlled  -- On Coreg 12.5 mg, HCTZ 25 mg,  Losartan 100 mg  -- Discontinued Norvasc and hydralazine  -- HR in 70-80's  Recs:  -- BP goal < 140/80 mmHg in patient with hemorraghic CVA and infrarenal AAA  -- Continue to adjust BP meds as necessary per BP goals      VTE Risk Mitigation (From admission, onward)         Ordered     enoxaparin injection 40 mg  Daily      05/18/20 1306     Reason for No Pharmacological VTE Prophylaxis  Once     Question:  Reasons:  Answer:  Risk of Bleeding    05/14/20 1101     IP VTE HIGH RISK PATIENT  Once      05/14/20 1101     Place sequential compression device  Until discontinued      05/14/20 1101              Hospital medicine will sign off. Patient medically stable for discharge/transfer. Will  follow labs peripherally. Please call with any further questions or concerns.        David David MD  Department of Hospital Medicine   [facility]                    05/26/2020                             STAFF PHYSICIAN NOTE                                   Attending Attestation for Rounds with Resident  I have reviewed and concur with the resident's history, physical, assessment, and plan.  I have personally interviewed and examined the patient at bedside and agree with the resident's findings.                                     Mitzi Ham MD  Senior Hospitalist  [id number], [phone]

## 2020-05-26 NOTE — ASSESSMENT & PLAN NOTE
-Stroke risk factor, likely etiology of ICH in this pt  - Med regiment adjusted:   Coreg 12.5mg BID   HCTZ to 25 mg Daily              Losartan 100 mg Daily  Off Norvasc/Hydralazine per HM, can consider Losartan increase if needed

## 2020-05-26 NOTE — ASSESSMENT & PLAN NOTE
-- controlled  -- On Coreg 12.5 mg, HCTZ 25 mg,  Losartan 100 mg  -- Discontinued Norvasc and hydralazine  -- HR in 70-80's  Recs:  -- BP goal < 140/80 mmHg in patient with hemorraghic CVA and infrarenal AAA  -- Continue to adjust BP meds as necessary per BP goals

## 2020-05-26 NOTE — ASSESSMENT & PLAN NOTE
-- Hypovolemic hyponatremia  -- sodium levels 143, free water deficit 2.8 L (05/26)  -- patient has been getting 250 cc/Q6H free water flushes, increased free water flushes to 500 mL every 6 hours, and briefly 750 mL every 6 hours, now at 350 mL every 6 hours with sodium responding appropriately  -- sodium levels 143 @ 0500 on 05/25/20, free water deficit calculated 3.6 L  Plan:  -- free water flushes decreased to 350 mL every 6 hours starting 1800 (05/25/20)- if patient continues to trend down beyond goal sodium level of 136, decrease free water flushes to 200 mL every 6 hours  -- No oral intake due to aspiration risk  -- Repeat BMPs scheduled daily- next on 5/27 @ 0300   -- Correction rate not to exceed 12 meq in 24 hours period

## 2020-05-26 NOTE — PT/OT/SLP PROGRESS
Speech Language Pathology Treatment    Patient Name:  Brett Vega   MRN:  [id number]  Admitting Diagnosis: Nontraumatic intracerebral hemorrhage    Recommendations:                 General Recommendations:  Dysphagia therapy and Speech/language therapy  Diet recommendations:  NPO, Liquid Diet Level: NPO Puree for pleasure and meds crushed in puree acceptable (limit 3-4 tsp)  Aspiration Precautions: Alternate means of nutrition/hydration, Meds crushed in puree (limit 3-4 tsp) and Strict aspiration precautions   General Precautions: Standard, aspiration, fall, vision impaired, NPO  Communication strategies:  go to room if call light pushed    Subjective     Pt awake and alert upon ST entry seated in bedside chair. Pt agreeable to participate with ST.    Pain/Comfort:  · Pain Rating 1: 0/10  · Pain Rating Post-Intervention 1: 0/10    Objective:     Has the patient been evaluated by SLP for swallowing?   Yes  Keep patient NPO? Yes   Current Respiratory Status: nasal cannula      Pt with visibly dry mucosal quality upon ST entry. ST performed oral care with use of suction. Pt performed x5 falsetto /i/ and x8 effortful swallow this date with improved initiation, however remains delayed. Pt consumed x5 tsp honey-thick liquid without overt s/s of aspiration or airway compromise. Pt consumed x6 tsp pudding with throat clear post initial tsp. ST educated pt regarding ongoing NPO status, ongoing swallow exercises, and POC moving forward to which pt verbalized understanding. Pt stated he felt that he was unsteady in bedside chair, however ST reassured him that he was safe. RN notified regarding pt concern and ongoing meds crushed in puree/puree for pleasure if limited to 3-4 tsp. Continue ST per POC.     Assessment:     Brett Vega is a 75 y.o. male with an SLP diagnosis of Dysphagia and Dysarthria.    Goals:   Multidisciplinary Problems     SLP Goals        Problem: SLP Goal    Goal Priority Disciplines Outcome   SLP  Goal     SLP Ongoing, Progressing   Description:  Speech Language Pathology Goals  Goals expected to be met by 5/28:    1. Pt will participate in ongoing swallow assessment  2. Pt will participate in ongoing trials of puree and honey thick liquids without overt s/s of aspiration or airway compromise.  3. Pt will utilize speech strategies with min cues  4. Pt will follow complex commands with 50% accy and min cues  5. Pt will complete problem solving task with 80% accy and min cues  6. Pt will participate in swallow exercises to facilitate strengthening of the swallowing mechanism in freq. of x5-10 reps.                           Plan:     · Patient to be seen:  5 x/week   · Plan of Care reviewed with:  patient   · SLP Follow-Up:  Yes       Discharge recommendations:  rehabilitation facility     Time Tracking:     SLP Treatment Date:   05/26/20  Speech Start Time:  0841  Speech Stop Time:  0901     Speech Total Time (min):  20 min    Billable Minutes: Treatment Swallowing Dysfunction 12 and Seld Care/Home Management Training 8    Razia Camarillo CF-SLP  05/26/2020

## 2020-05-26 NOTE — SUBJECTIVE & OBJECTIVE
Interval History 5/26/2020:  Patient is seen for follow-up rehab evaluation and recommendations: participating with therapy.Toelrating feeds.     HPI, Past Medical, Family, and Social History remains the same as documented in the initial encounter.    Scheduled Medications:    carvediloL  12.5 mg Oral BID    enoxaparin  40 mg Subcutaneous Daily    hydroCHLOROthiazide  25 mg Oral Daily    losartan  100 mg Oral Daily    polyethylene glycol  17 g Oral Daily    senna-docusate 8.6-50 mg  1 tablet Oral BID    silodosin  4 mg Oral Daily       Diagnostic Results: Labs: Reviewed    PRN Medications: acetaminophen, albuterol-ipratropium, hydrALAZINE, labetaloL, sodium chloride 0.9%    Review of Systems   Constitutional: Positive for activity change. Negative for fatigue and fever.   HENT: Positive for trouble swallowing. Negative for voice change.    Genitourinary: Negative for difficulty urinating and flank pain.   Musculoskeletal: Positive for gait problem. Negative for back pain.   Neurological: Positive for speech difficulty and weakness (R sided).   Psychiatric/Behavioral: Positive for confusion. Negative for agitation and behavioral problems.     Objective:     Vital Signs (Most Recent):  Temp: 97.9 °F (36.6 °C) (05/26/20 0845)  Pulse: 62 (05/26/20 1124)  Resp: (!) 31 (05/26/20 1000)  BP: 139/85 (05/26/20 1000)  SpO2: 95 % (05/26/20 1000)    Vital Signs (24h Range):  Temp:  [97.8 °F (36.6 °C)-98.4 °F (36.9 °C)] 97.9 °F (36.6 °C)  Pulse:  [60-78] 62  Resp:  [18-31] 31  SpO2:  [91 %-97 %] 95 %  BP: ()/(65-87) 139/85     Physical Exam   Constitutional: He appears well-developed and well-nourished.   HENT:   Head: Normocephalic and atraumatic.   Eyes: Pupils are equal, round, and reactive to light. EOM are normal.   Pulmonary/Chest: Effort normal. No respiratory distress.   Abdominal:   PEG   Musculoskeletal:   - R sided weakness   Neurological: He is alert.   - following commands  - confusion present  -  dysarthria    Skin: Skin is warm and dry.   Psychiatric: His behavior is normal.   Nursing note and vitals reviewed.    NEUROLOGICAL EXAMINATION:     CRANIAL NERVES     CN III, IV, VI   Pupils are equal, round, and reactive to light.  Extraocular motions are normal.

## 2020-05-26 NOTE — PLAN OF CARE
Problem: Physical Therapy Goal  Goal: Physical Therapy Goal  Description  PT goals until 6/3/20    1. Pt supine to sit with minimal assist-not met  2. Pt sit to supine with minimal assist-not met  3. Pt sit to stand with hemiwalker with minimal assist-not met  4. Pt to perform gait 5ft with hemiwalker with max assist.-not met  5. Pt to transfer bed to/from bedside chair with hemiwalker with moderate assist.-not met  6. Pt to perform B LE exs in sitting or supine x 15 reps to strengthen B LE to improve functional mobility.-not met  7. Pt to sit on the EOB ~ 10 min with CGA to allow pt to perform functional activities-not met      Outcome: Ongoing, Progressing   Pt's goals remain appropriate and pt will continue to benefit from skilled PT services to work towards improved functional mobility including: bed mobility, transfers, and gait.   Marissa Guajardo, PT  5/26/2020

## 2020-05-26 NOTE — PLAN OF CARE
Patient medically ready for discharge to [facility]. Any necessary transport setup by . This CM scheduled or requested necessary follow-up appointments.     No future appointments.     05/26/20 1559   Final Note   Assessment Type Final Discharge Note   Anticipated Discharge Disposition Rehab   Hospital Follow Up  Appt(s) scheduled? No   Right Care Referral Info   Post Acute Recommendation IRF   Facility Name [facility]       Nina Ivy RN  Case Management  Ext: [phone]  05/26/2020  4:00 PM

## 2020-05-27 ENCOUNTER — HOSPITAL ENCOUNTER (OUTPATIENT)
Dept: RADIOLOGY | Facility: HOSPITAL | Age: 75
Discharge: HOME | End: 2020-05-27
Attending: NURSE PRACTITIONER
Payer: MEDICARE

## 2020-05-27 PROCEDURE — 71045 X-RAY EXAM CHEST 1 VIEW: CPT | Mod: 26,,, | Performed by: RADIOLOGY

## 2020-05-27 PROCEDURE — 71045 XR CHEST 1 VIEW: ICD-10-PCS | Mod: 26,,, | Performed by: RADIOLOGY

## 2020-05-27 NOTE — PT/OT/SLP DISCHARGE
Occupational Therapy Discharge Summary    Brett Vega  MRN: [id number]   Principal Problem: Nontraumatic intracerebral hemorrhage      Patient Discharged from acute Occupational Therapy on 5/26/2020.  Please refer to prior OT note dated 5/26/2020 for functional status.    Assessment:      Goals partially met.    Objective:     GOALS:   Multidisciplinary Problems     Occupational Therapy Goals     Not on file          Multidisciplinary Problems (Resolved)        Problem: Occupational Therapy Goal    Goal Priority Disciplines Outcome Interventions   Occupational Therapy Goal   (Resolved)     OT, PT/OT Met    Description:  Goals to be met by: 5/29/2020     Patient will increase functional independence with ADLs by performing:    Grooming while standing with Moderate Assistance.  LB dressing with moderate assistance.  Toileting from bedside commode with Moderate Assistance for hygiene and clothing management.   Sitting at edge of bed x10 minutes with Minimal Assistance in preparation of functional seated grooming tasks. MET  *revised: with CGA x10 min and midline orientation maintained  Stand pivot transfers with Moderate Assistance. Met   *revised with min(A)  Toilet transfer to bedside commode with Moderate Assistance.                        Reasons for Discontinuation of Therapy Services  Transfer to alternate level of care.      Plan:     Patient Discharged to: Inpatient Rehab    DELLA Evans  5/27/2020

## 2020-05-27 NOTE — PLAN OF CARE
Pt d/c at this time; goals partially met. DELLA Evans 5/27/2020   Problem: Occupational Therapy Goal  Goal: Occupational Therapy Goal  Description  Goals to be met by: 5/29/2020     Patient will increase functional independence with ADLs by performing:    Grooming while standing with Moderate Assistance.  LB dressing with moderate assistance.  Toileting from bedside commode with Moderate Assistance for hygiene and clothing management.   Sitting at edge of bed x10 minutes with Minimal Assistance in preparation of functional seated grooming tasks. MET  *revised: with CGA x10 min and midline orientation maintained  Stand pivot transfers with Moderate Assistance. Met   *revised with min(A)  Toilet transfer to bedside commode with Moderate Assistance.       Outcome: Met

## 2020-06-03 ENCOUNTER — TELEPHONE (OUTPATIENT)
Dept: VASCULAR SURGERY | Facility: CLINIC | Age: 75
End: 2020-06-03

## 2020-06-04 ENCOUNTER — TELEPHONE (OUTPATIENT)
Dept: VASCULAR SURGERY | Facility: CLINIC | Age: 75
End: 2020-06-04

## 2020-06-04 ENCOUNTER — HOSPITAL ENCOUNTER (OUTPATIENT)
Dept: RADIOLOGY | Facility: HOSPITAL | Age: 75
Discharge: HOME | End: 2020-06-04
Attending: PHYSICAL MEDICINE & REHABILITATION
Payer: MEDICARE

## 2020-06-04 PROCEDURE — 71045 X-RAY EXAM CHEST 1 VIEW: CPT | Mod: 26,,, | Performed by: RADIOLOGY

## 2020-06-04 PROCEDURE — 71045 XR CHEST 1 VIEW: ICD-10-PCS | Mod: 26,,, | Performed by: RADIOLOGY

## 2020-06-04 NOTE — TELEPHONE ENCOUNTER
Left a voice message with a call back number [phone] for Jn to reschedule appt  ----- Message from Dion Mae sent at 6/3/2020  4:41 PM CDT -----  Contact: Jn (Inpatient Rehab)  Called to speak w/ someone regarding rescheduling the patient's appt from 6/8 to 6/17 which is the day he is discharged from inpatient rehab, requesting callback     Callback: [phone] Jn

## 2020-06-04 NOTE — TELEPHONE ENCOUNTER
----- Message from Sintia Dumas sent at 6/4/2020 10:35 AM CDT -----  Contact: Jn Isaac From [facility] [phone]  Would like to see if apt on 6/8/20 can be rescheduled to 6/19/20.

## 2020-06-11 ENCOUNTER — TELEPHONE (OUTPATIENT)
Dept: SPEECH THERAPY | Facility: HOSPITAL | Age: 75
End: 2020-06-11

## 2020-06-11 DIAGNOSIS — R13.12 DYSPHAGIA, OROPHARYNGEAL: Primary | ICD-10-CM

## 2020-06-12 ENCOUNTER — HOSPITAL ENCOUNTER (OUTPATIENT)
Dept: RADIOLOGY | Facility: HOSPITAL | Age: 75
Discharge: HOME | End: 2020-06-12
Attending: PHYSICAL MEDICINE & REHABILITATION
Payer: MEDICARE

## 2020-06-12 ENCOUNTER — HOSPITAL ENCOUNTER (EMERGENCY)
Facility: HOSPITAL | Age: 75
Discharge: HOME | End: 2020-06-13
Attending: EMERGENCY MEDICINE
Payer: MEDICARE

## 2020-06-12 ENCOUNTER — CLINICAL SUPPORT (OUTPATIENT)
Dept: SPEECH THERAPY | Facility: HOSPITAL | Age: 75
End: 2020-06-12
Payer: MEDICARE

## 2020-06-12 VITALS
RESPIRATION RATE: 18 BRPM | SYSTOLIC BLOOD PRESSURE: 172 MMHG | HEART RATE: 72 BPM | OXYGEN SATURATION: 95 % | WEIGHT: 190 LBS | BODY MASS INDEX: 27.26 KG/M2 | TEMPERATURE: 98 F | DIASTOLIC BLOOD PRESSURE: 94 MMHG

## 2020-06-12 DIAGNOSIS — T17.908A ASPIRATION INTO AIRWAY, INITIAL ENCOUNTER: Primary | ICD-10-CM

## 2020-06-12 DIAGNOSIS — R13.12 DYSPHAGIA, OROPHARYNGEAL: ICD-10-CM

## 2020-06-12 LAB
ALBUMIN SERPL BCP-MCNC: 3.4 G/DL (ref 3.5–5.2)
ALP SERPL-CCNC: 88 U/L (ref 55–135)
ALT SERPL W/O P-5'-P-CCNC: 40 U/L (ref 10–44)
ANION GAP SERPL CALC-SCNC: 7 MMOL/L (ref 8–16)
AST SERPL-CCNC: 31 U/L (ref 10–40)
BASOPHILS # BLD AUTO: 0.02 K/UL (ref 0–0.2)
BASOPHILS NFR BLD: 0.4 % (ref 0–1.9)
BILIRUB SERPL-MCNC: 0.4 MG/DL (ref 0.1–1)
BUN SERPL-MCNC: 15 MG/DL (ref 8–23)
CALCIUM SERPL-MCNC: 9.2 MG/DL (ref 8.7–10.5)
CHLORIDE SERPL-SCNC: 101 MMOL/L (ref 95–110)
CO2 SERPL-SCNC: 31 MMOL/L (ref 23–29)
CREAT SERPL-MCNC: 0.8 MG/DL (ref 0.5–1.4)
DIFFERENTIAL METHOD: ABNORMAL
EOSINOPHIL # BLD AUTO: 0.2 K/UL (ref 0–0.5)
EOSINOPHIL NFR BLD: 3.5 % (ref 0–8)
ERYTHROCYTE [DISTWIDTH] IN BLOOD BY AUTOMATED COUNT: 14.4 % (ref 11.5–14.5)
EST. GFR  (AFRICAN AMERICAN): >60 ML/MIN/1.73 M^2
EST. GFR  (NON AFRICAN AMERICAN): >60 ML/MIN/1.73 M^2
GLUCOSE SERPL-MCNC: 106 MG/DL (ref 70–110)
HCT VFR BLD AUTO: 38 % (ref 40–54)
HGB BLD-MCNC: 12.5 G/DL (ref 14–18)
IMM GRANULOCYTES # BLD AUTO: 0.02 K/UL (ref 0–0.04)
IMM GRANULOCYTES NFR BLD AUTO: 0.4 % (ref 0–0.5)
LYMPHOCYTES # BLD AUTO: 1 K/UL (ref 1–4.8)
LYMPHOCYTES NFR BLD: 17.6 % (ref 18–48)
MCH RBC QN AUTO: 30.3 PG (ref 27–31)
MCHC RBC AUTO-ENTMCNC: 32.9 G/DL (ref 32–36)
MCV RBC AUTO: 92 FL (ref 82–98)
MONOCYTES # BLD AUTO: 0.6 K/UL (ref 0.3–1)
MONOCYTES NFR BLD: 10.3 % (ref 4–15)
NEUTROPHILS # BLD AUTO: 3.7 K/UL (ref 1.8–7.7)
NEUTROPHILS NFR BLD: 67.8 % (ref 38–73)
NRBC BLD-RTO: 0 /100 WBC
PLATELET # BLD AUTO: 205 K/UL (ref 150–350)
PMV BLD AUTO: 10.1 FL (ref 9.2–12.9)
POTASSIUM SERPL-SCNC: 4.3 MMOL/L (ref 3.5–5.1)
PROT SERPL-MCNC: 7.1 G/DL (ref 6–8.4)
RBC # BLD AUTO: 4.12 M/UL (ref 4.6–6.2)
SODIUM SERPL-SCNC: 139 MMOL/L (ref 136–145)
WBC # BLD AUTO: 5.44 K/UL (ref 3.9–12.7)

## 2020-06-12 PROCEDURE — 25500020 PHARM REV CODE 255: Performed by: PHYSICAL MEDICINE & REHABILITATION

## 2020-06-12 PROCEDURE — 92611 MOTION FLUOROSCOPY/SWALLOW: CPT | Mod: GN

## 2020-06-12 PROCEDURE — A9698 NON-RAD CONTRAST MATERIALNOC: HCPCS | Performed by: PHYSICAL MEDICINE & REHABILITATION

## 2020-06-12 PROCEDURE — 99232 PR SUBSEQUENT HOSPITAL CARE,LEVL II: ICD-10-PCS | Mod: ,,, | Performed by: PHYSICAL MEDICINE & REHABILITATION

## 2020-06-12 PROCEDURE — 80053 COMPREHEN METABOLIC PANEL: CPT

## 2020-06-12 PROCEDURE — 99284 EMERGENCY DEPT VISIT MOD MDM: CPT | Mod: 25

## 2020-06-12 PROCEDURE — 99284 PR EMERGENCY DEPT VISIT,LEVEL IV: ICD-10-PCS | Mod: ,,, | Performed by: EMERGENCY MEDICINE

## 2020-06-12 PROCEDURE — 99232 SBSQ HOSP IP/OBS MODERATE 35: CPT | Mod: ,,, | Performed by: PHYSICAL MEDICINE & REHABILITATION

## 2020-06-12 PROCEDURE — 99284 EMERGENCY DEPT VISIT MOD MDM: CPT | Mod: ,,, | Performed by: EMERGENCY MEDICINE

## 2020-06-12 PROCEDURE — 85025 COMPLETE CBC W/AUTO DIFF WBC: CPT

## 2020-06-12 PROCEDURE — 74230 FL MODIFIED BARIUM SWALLOW SPEECH STUDY: ICD-10-PCS | Mod: 26,,, | Performed by: RADIOLOGY

## 2020-06-12 PROCEDURE — 74230 X-RAY XM SWLNG FUNCJ C+: CPT | Mod: 26,,, | Performed by: RADIOLOGY

## 2020-06-12 RX ADMIN — BARIUM SULFATE 2 ML: 0.81 POWDER, FOR SUSPENSION ORAL at 03:06

## 2020-06-12 NOTE — PROGRESS NOTES
MODIFIED BARIUM SWALLOW STUDY SPEECH PATHOLOGY REPORT    REASON FOR REFERRAL:  Brett Vega, age 75 y.o., was referred by Dr. Neves, Stephen GUADALUPE MD for a Modified Barium Swallow Study to rule out aspiration,assess overall swallowing anatomy and function, determine efficacy of compensatory strategies and recommend safest consistencies for oral intake.  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 He is NPO and received nutrition via PEG tube.  He is current a patient at [facility]. History positive for pneumonia.    SWALLOWING HISTORY  Patient is currently NPO except for therapeutic trials.  Patient has been receiving dysphagia therapy through speech pathology services at [facility].      PREVIOUS MBSS: 5/21/20  Impressions    Patient demonstrates mod-severe Oropharyngeal  dysphagia characterized by david silent aspiration of nectar-thickened liquids, aspiration of honey-thick liquid with tsp presentations.       Plan  Pt to remain NPO at this time with alternate means of nutrition/hydration/medication. ST to initiate trials of puree and honey-thick liquids at bedside along with exercises to target strengthening of the swallow mechanism. Continue ST per POC.     SOCIAL HISTORY:  Patient lives in [place], [place]    BEHAVIOR:  Brett Vega was pleasant and cooperated fully during the study. He was mildly impulsive and had difficulty following directions to briefly hold the bolus in his mouth prior to the swallow.  Results of today's assessment were considered indicative of current levels of swallowing functioning.      HEARING:  Subjectively, within normal limits.     ORAL PERIPHERAL:   Edentulous. Patient did not have dentures. Speech is mildly slurred, imprecise.    Voice quality was strong. Wet quality was heard briefly after aspiration of thin and liquids, otherwise voice was clear.     TEST FINDINGS:   Patient was seen  in Radiology with the Radiologist for a Modified Barium Swallow Study and was positioned for a left lateral videofluoroscopic view.      Consistencies assessed using radiopaque barium contrast:  Thin liquids (1 tsp x1) by spoon.   Thin puree (applesauce) with barium contrast pudding by spoon  Thick puree barium contrast pudding by spoon  Solid (1/4 cracker) with barium contrast pudding   13 mm barium tablet    Phases:  Oral:  Patient was able to obtain liquid and strip utensils adequately with no anterior loss of material from the oral cavity.  He moved boluses through the oral cavity with appropriate transit time, however, he allow at least a portion of the bolus to spill into the pharynx prior to the swallow, particularly on thin and nectar thick liquids. There was no pooling of liquids in the mouth.     Pharyngeal:    Swallow reflex was triggered with a delay resulting in aspiration of thin and nectar thick liquids and the barium tablet before the swallow. It also resulted in penetration but no aspiration of honey thick liquid and pudding consistency. Patient had a responsive cough following aspiration of thin and nectar thick liquids and barium tablet, but cough is not sufficient to clear aspirate from the airway.     - Delayed initiation  - Adequate soft palate elevation  - Adequate laryngeal elevation and anterior hyoid excursion  - Reduced tongue base retraction  - Incomplete epiglottic inversion  - Incomplete laryngeal vestibular closure  - Reduced pharyngeal stripping wave  - Adequate PE segment opening.  -  Moderate pharyngeal residue in valleculae and pyriforms    Cervical Esophageal:  Boluses entered the upper esophagus within normal limits.  No obstruction was noted.    Strategies Thickening liquids to honey consistency eliminated aspiration of liquids.     Rosenbeck 8-point Penetration-Aspiration Scale:     Thin liquids: 7 - Material enters the airway, passes below the vocal folds, and is not ejected  "from the trachea despite effort.   Nectar thick 7 - Material enters the airway, passes below the vocal folds, and is not ejected from the trachea despite effort.  Thin puree: 1 - Material does not enter airway.  Thick puree:2 - Material enters the airway, remains above the vocal folds, and is ejected from the airway.  Solid(barium laced cracker): 1 - Material does not enter airway.  Tablet/Capsule: 7 - Material enters the airway, passes below the vocal folds, and is not ejected from the trachea despite effort.    Strategies: Thickened liquids (honey) eliminated aspiration.  Study was terminated after patient aspirated the barium tablet and no further strategies were attempted.    IMPRESSIONS:     1. Oropharyngeal dysphagia characterized by   A.poor oral control of bolus and delayed triggering of the swallow, resulting in premature spilling of bolus into the pharynx prior to the swallow. This further resulted in penetration of puree consistency and aspiration of thin liquid, nectar thick liquid and barium tablet.  B. Weak base of tongue and pharyngeal wall constriction resulting in residue in the valleculae and pyriforms after the swallow.    2. No cervical esophageal swallowing abnormalities were noted.        RECOMMENDATIONS/PLAN OF CARE:     1. Begin supervised oral feeding with speech therapy with a puree diet and honey thick liquids    2. Compensatory strategies include:   Upright posture for all oral intake.   Thicken liquids to honey consistency  Take small sips/bites (1/2 to 1 teaspoon)  Take additional effortful "dry"/saliva swallows to help clear pooling in the throat.    Alternate food bites with small sips of honey thick liquid to clear food from the throat.    3. Take medications crushed or via G-tube    4. Continue dysphagia treatment - see below    5. Review of the swallow study results by the referring physician for further management of the patients concerns.    6. Contact [facility] " at [phone] with any further questions or concerns.    Long-term goals:  Patient will consume a regular diet with thin liquids with no signs/symptoms of aspiration.    Short-term goals:  Patient will   1.  Complete exercises to increase strength of the swallow including but not limited to:  Tongue base retraction, effortful swallow, Klaudia, chin tuck against resistance.  2. Complete exercise using supraglottic swallow to increase airway protection prior to, during and after the swallow.  3. Increase oral control of bolus to prevent premature spilling into the pharynx prior to the swallow.    Following the MBSS, and per radiology protocol,  patient was transported to the ED for evaluation after aspirating the barium tablet during the study.   This therapist informed Mary Mccall of patient's transfer to ED and faxed results to Inpatient Rehab.

## 2020-06-12 NOTE — PLAN OF CARE
"  RECOMMENDATIONS/PLAN OF CARE:     1. Begin supervised oral feeding with speech therapy with a puree diet and honey thick liquids    2. Compensatory strategies include:   Upright posture for all oral intake.   Thicken liquids to honey consistency  Take small sips/bites (1/2 to 1 teaspoon)  Take additional effortful "dry"/saliva swallows to help clear pooling in the throat.    Alternate food bites with small sips of honey thick liquid to clear food from the throat.    3. Take medications crushed or via G-tube    4. Continue dysphagia treatment - see below    5. Review of the swallow study results by the referring physician for further management of the patients concerns.    6. Contact [facility] at [phone] with any further questions or concerns.    Long-term goals:  Patient will consume a regular diet with thin liquids with no signs/symptoms of aspiration.    Short-term goals:  Patient will   1.  Complete exercises to increase strength of the swallow including but not limited to:  Tongue base retraction, effortful swallow, Klaudia, chin tuck against resistance.  2. Complete exercise using supraglottic swallow to increase airway protection prior to, during and after the swallow.  3. Increase oral control of bolus to prevent premature spilling into the pharynx prior to the swallow.    Following the MBSS, and per radiology protocol,  patient was transported to the ED for evaluation after aspirating the barium tablet during the study.   This therapist informed Mary Mccall of patient's transfer to ED and faxed results to Inpatient Rehab.    "

## 2020-06-12 NOTE — ED PROVIDER NOTES
"CC: Aspiration (possible aspiration, patient was having a barium swallow test done and coughed while swallowing + cough )      History provided by:   Patient     HPI: Brett Vega is a 75 y.o. year old male who presents to the ED complaining of Possible aspiration after having a barium swallow test..   patient reports that he was having the test when he was having difficulty Swallowing the pill and the pill felt like was getting stuck up on the left side of his throat, he started coughing and then the p[ill came up in his mouth.   he denies any chest pain shortness of breath cough or fever he reports he feels as his normal self   He has been having nutrition through the PEG tube.    Per Sasha Ruiz, the barium tablet was seen going into the airway,  Unsure on which side as they saw the tablet going in the airway on a lateral x-ray    Patient with history of stroke difficulty speaking  "Swallow reflex was triggered with a delay resulting in aspiration of thin and nectar thick liquids before the swallow. It also resulted in penetration of pudding and aspiration of the barium tablet. Patient had a responsive cough following aspiration of  thin liquids and barium tablet, but cough is not sufficient to clear aspirate from the airway.."      PMH: CVA    No past medical history on file.  No past surgical history on file.  No family history on file.  Current Facility-Administered Medications on File Prior to Encounter   Medication Dose Route Frequency Provider Last Rate Last Dose    [COMPLETED] barium sulfare (E-Z DISK) tablet 700 mg  700 mg Oral ONCE PRN Stephen Neves MD   700 mg at 06/12/20 1545    [COMPLETED] barium sulfate (VARIBAR HONEY) oral suspension 5 mL  5 mL Oral ONCE PRN Stephen Neves MD   5 mL at 06/12/20 1545    [COMPLETED] barium sulfate (VARIBAR PUDDING) oral paste 10 mL  10 mL Oral ONCE PRN Stephen Neves MD   10 mL at 06/12/20 1545    [COMPLETED] barium sulfate (VARIBAR THIN " LIQUID) oral powder 2 mL  2 mL Oral ONCE PRN Stephen Neves MD   2 mL at 06/12/20 1545    [DISCONTINUED] albuterol-ipratropium 2.5 mg-0.5 mg/3 mL nebulizer solution  3 mL Inhalation  Generic External Data Provider        [DISCONTINUED] fluticasone propion-salmeteroL 55-14 mcg/actuation AePB  1 puff Inhalation  Generic External Data Provider         Current Outpatient Medications on File Prior to Encounter   Medication Sig Dispense Refill    albuterol-ipratropium (DUO-NEB) 2.5 mg-0.5 mg/3 mL nebulizer solution Take 3 mLs by nebulization every 4 (four) hours as needed for Wheezing or Shortness of Breath. Rescue 1 Box 0    carvediloL (COREG) 12.5 MG tablet Take 1 tablet (12.5 mg total) by mouth 2 (two) times daily. 60 tablet 11    hydroCHLOROthiazide (HYDRODIURIL) 25 MG tablet Take 1 tablet (25 mg total) by mouth once daily. 30 tablet 11    losartan (COZAAR) 100 MG tablet Take 1 tablet (100 mg total) by mouth once daily. 90 tablet 3    silodosin (RAPAFLO) 4 mg Cap capsule Take 1 capsule (4 mg total) by mouth once daily. 30 capsule 11     Patient has no known allergies.  Social History     Socioeconomic History    Marital status:      Spouse name: Not on file    Number of children: Not on file    Years of education: Not on file    Highest education level: Not on file   Occupational History    Not on file   Social Needs    Financial resource strain: Not on file    Food insecurity     Worry: Not on file     Inability: Not on file    Transportation needs     Medical: Not on file     Non-medical: Not on file   Tobacco Use    Smoking status: Former Smoker    Smokeless tobacco: Never Used   Substance and Sexual Activity    Alcohol use: Not Currently    Drug use: Not Currently    Sexual activity: Not on file   Lifestyle    Physical activity     Days per week: Not on file     Minutes per session: Not on file    Stress: Not on file   Relationships    Social connections     Talks on phone: Not  on file     Gets together: Not on file     Attends Buddhist service: Not on file     Active member of club or organization: Not on file     Attends meetings of clubs or organizations: Not on file     Relationship status: Not on file   Other Topics Concern    Not on file   Social History Narrative    Not on file       ROS:     Constitutional : neg for fever, neg for weakness  HENT neg for head injury, neg for sore throat  Eyes: neg for visual changes, neg for eye pain  Resp  Cough during the barium test with concern for aspiration  Cardiac  neg for chest pain, neg for palpitations  GI neg for abd pain, neg for nausea, neg for vomiting   neg for urinary changes  Neuro neg for focal weakness or numbness  Skin neg for skin rash  MSK: neg for myalgia, neg for arthralgia  ALL: Patient has no known allergies.    PHYSICAL EXAM:  Vitals:    06/12/20 2001   BP: (!) 172/94   Pulse: 72   Resp:    Temp:          PHYSICAL EXAM:     general: comfortable, in no acute distress, pleasant, well nourished  VS: triage VS reviewed  HENT: NC/AT  Eyes: PERRL, EOMI  CV: RRR, no  murmurs, no rubs, no gallops, no LE edema  Resp:  mildly dyspneic with speaking however patient reports this is normal for him,   Bibasilar rhonchi   ABD:  soft, ND, + normal BS, NT, PEG tube in place,  Soft nontender  Renal: No CVAT  Neuro: AAO x 3, face symmetric,  Mild dysarthria  MSK: no deformity, no edema            DATA & INTERVENTIONS:    LABS reviewed:  Labs Reviewed   COMPREHENSIVE METABOLIC PANEL - Abnormal; Notable for the following components:       Result Value    CO2 31 (*)     Albumin 3.4 (*)     Anion Gap 7 (*)     All other components within normal limits   CBC W/ AUTO DIFFERENTIAL - Abnormal; Notable for the following components:    RBC 4.12 (*)     Hemoglobin 12.5 (*)     Hematocrit 38.0 (*)     Lymph% 17.6 (*)     All other components within normal limits       RADIOLOGY reviewed:  Imaging Results          X-Ray Chest PA And Lateral (Final  result)  Result time 06/12/20 19:27:31    Final result by Erik Quesada MD (06/12/20 19:27:31)                 Impression:      No acute process.      Electronically signed by: Erik Quesada MD  Date:    06/12/2020  Time:    19:27             Narrative:    EXAMINATION:  XR CHEST PA AND LATERAL    CLINICAL HISTORY:  Unspecified foreign body in respiratory tract, part unspecified causing other injury, initial encounter    TECHNIQUE:  PA and lateral views of the chest were performed.    COMPARISON:  06/04/2020 and 06/12/2020.    FINDINGS:  The trachea is unremarkable.  There is stable enlargement of the cardiomediastinal silhouette.  The hilar structures are unremarkable.  The hemidiaphragms are unremarkable.  There is no evidence of free air beneath the hemidiaphragms.  There are no pleural effusions.  There is no evidence of a pneumothorax.  There is no evidence of pneumomediastinum.  No airspace opacity is present.  No radiopaque foreign body is identified.  There are degenerative changes in the osseous structures.                                MEDICATIONS/FLUIDS:  Medications - No data to display      MDM:  Brett Vega is a 75 y.o. year old male who presents to the ED complaining of  Possible aspiration during the barium swallow test.     vitals with oxygen saturation 93-95% on room.  Chart review with oxygen saturation of 93% also in May 2020      Labs ordered and reviewed:   CMP no gross abnormalities   CBC normal white count, hemoglobin 12.5, platelets within normal limits       CXR (ordered and reviewed): No foreign body.  Result also discussed with Radiology  Imagings independently visualized: yes     I discussed again with the patient, patient is convinced that he coughed up the barium tab. Chest x-ray with no foreign body, patient reports feeling at baseline.  Advised to return to the emergency department if he develops any symptoms of pneumonia such as shortness of breath, cough, chest pain,  fever        IMPRESSION:  1.)  Concern of aspiration of barium pill  during barium swallow test      Dispo: Discharge    Critical Care Time: N/A       Brenda Stoll MD  06/13/20 0582

## 2020-06-12 NOTE — CARE UPDATE
During Mr. Vega's modified barium swallow study, there was witnessed aspiration of a 13mm barium tablet. He immediately began coughing but was unable to expectorate the tablet.His coughing subsided and he denied shortness of breath. He was transported to the ED for further evaluation and monitoring.     Dmitri Rodriguez MD  Radiology PGY-4  [phone]

## 2020-06-13 NOTE — DISCHARGE INSTRUCTIONS
Please read return to the emergency department if you notice any shortness of breath, cough, chest pain, fever or any other concerns

## 2020-06-13 NOTE — ED NOTES
Bed: [facility]  Expected date:   Expected time:   Means of arrival:   Comments:

## 2020-06-17 DIAGNOSIS — R33.9 URINARY RETENTION: ICD-10-CM

## 2020-06-17 DIAGNOSIS — I63.9 ACUTE ISCHEMIC STROKE: Primary | ICD-10-CM

## 2020-06-19 ENCOUNTER — OFFICE VISIT (OUTPATIENT)
Dept: VASCULAR SURGERY | Facility: CLINIC | Age: 75
End: 2020-06-19
Attending: SURGERY
Payer: MEDICARE

## 2020-06-19 VITALS
DIASTOLIC BLOOD PRESSURE: 85 MMHG | HEART RATE: 75 BPM | WEIGHT: 193 LBS | BODY MASS INDEX: 27.63 KG/M2 | SYSTOLIC BLOOD PRESSURE: 121 MMHG | TEMPERATURE: 98 F | HEIGHT: 70 IN

## 2020-06-19 DIAGNOSIS — I71.40 AAA (ABDOMINAL AORTIC ANEURYSM) WITHOUT RUPTURE: Primary | ICD-10-CM

## 2020-06-19 PROCEDURE — 1126F PR PAIN SEVERITY QUANTIFIED, NO PAIN PRESENT: ICD-10-PCS | Mod: S$GLB,,, | Performed by: SURGERY

## 2020-06-19 PROCEDURE — 3079F DIAST BP 80-89 MM HG: CPT | Mod: CPTII,S$GLB,, | Performed by: SURGERY

## 2020-06-19 PROCEDURE — 99999 PR PBB SHADOW E&M-EST. PATIENT-LVL III: ICD-10-PCS | Mod: PBBFAC,,, | Performed by: SURGERY

## 2020-06-19 PROCEDURE — 99213 OFFICE O/P EST LOW 20 MIN: CPT | Mod: S$GLB,,, | Performed by: SURGERY

## 2020-06-19 PROCEDURE — 99999 PR PBB SHADOW E&M-EST. PATIENT-LVL III: CPT | Mod: PBBFAC,,, | Performed by: SURGERY

## 2020-06-19 PROCEDURE — 3074F PR MOST RECENT SYSTOLIC BLOOD PRESSURE < 130 MM HG: ICD-10-PCS | Mod: CPTII,S$GLB,, | Performed by: SURGERY

## 2020-06-19 PROCEDURE — 1159F MED LIST DOCD IN RCRD: CPT | Mod: S$GLB,,, | Performed by: SURGERY

## 2020-06-19 PROCEDURE — 3074F SYST BP LT 130 MM HG: CPT | Mod: CPTII,S$GLB,, | Performed by: SURGERY

## 2020-06-19 PROCEDURE — 1101F PT FALLS ASSESS-DOCD LE1/YR: CPT | Mod: CPTII,S$GLB,, | Performed by: SURGERY

## 2020-06-19 PROCEDURE — 1126F AMNT PAIN NOTED NONE PRSNT: CPT | Mod: S$GLB,,, | Performed by: SURGERY

## 2020-06-19 PROCEDURE — 1101F PR PT FALLS ASSESS DOC 0-1 FALLS W/OUT INJ PAST YR: ICD-10-PCS | Mod: CPTII,S$GLB,, | Performed by: SURGERY

## 2020-06-19 PROCEDURE — 3079F PR MOST RECENT DIASTOLIC BLOOD PRESSURE 80-89 MM HG: ICD-10-PCS | Mod: CPTII,S$GLB,, | Performed by: SURGERY

## 2020-06-19 PROCEDURE — 1159F PR MEDICATION LIST DOCUMENTED IN MEDICAL RECORD: ICD-10-PCS | Mod: S$GLB,,, | Performed by: SURGERY

## 2020-06-19 PROCEDURE — 99213 PR OFFICE/OUTPT VISIT, EST, LEVL III, 20-29 MIN: ICD-10-PCS | Mod: S$GLB,,, | Performed by: SURGERY

## 2020-06-19 RX ORDER — AMLODIPINE BESYLATE 2.5 MG/1
2.5 TABLET ORAL
COMMUNITY
Start: 2020-06-19 | End: 2020-09-17

## 2020-06-19 RX ORDER — FUROSEMIDE 40 MG/1
40 TABLET ORAL
COMMUNITY
Start: 2020-06-19 | End: 2020-09-17

## 2020-06-19 RX ORDER — FLUTICASONE PROPIONATE AND SALMETEROL 55; 14 UG/1; UG/1
1 POWDER, METERED RESPIRATORY (INHALATION)
COMMUNITY
Start: 2020-06-19 | End: 2020-09-17

## 2020-06-19 RX ORDER — DOXAZOSIN 4 MG/1
4 TABLET ORAL
COMMUNITY
Start: 2020-06-19 | End: 2020-09-17

## 2020-06-19 RX ORDER — ATORVASTATIN CALCIUM 40 MG/1
40 TABLET, FILM COATED ORAL NIGHTLY
Qty: 30 TABLET | Refills: 11 | Status: CANCELLED | OUTPATIENT
Start: 2020-06-19

## 2020-06-19 NOTE — LETTER
June 19, 2020      Caty Dee MD  [address]  [facility] [address]           [facility] Vascular Surgery  [address]  [facility] [address]  Phone: [phone]  Fax: [phone]          Patient: Brett Vega   MR Number: [id number]   YOB: 1945   Date of Visit: 6/19/2020       Dear Dr. Caty Dee:    Thank you for referring Brett Vega to me for evaluation. Attached you will find relevant portions of my assessment and plan of care.    If you have questions, please do not hesitate to call me. I look forward to following Brett Vega along with you.    Sincerely,    GAGE Mcdowell II, MD    Enclosure  CC:  No Recipients    If you would like to receive this communication electronically, please contact [email or web address] or [phone] to request more information on Private Practice Link access.    For providers and/or their staff who would like to refer a patient to [facility], please contact us through our one-stop-shop provider referral line, [facility], at [phone].    If you feel you have received this communication in error or would no longer like to receive these types of communications, please e-mail [email or web address]

## 2020-06-19 NOTE — PROGRESS NOTES
VASCULAR SURGERY NOTE    Patient ID: Brett Vega is a 75 y.o. male.    I. HISTORY     Chief Complaint: AAA    HPI: Brett Vega is a 75 y.o. male who is here today for established patient appointment. He has juxtarenal AAA. I saw him as an inpatient after he was admitted for large central hemorrhagic stroke involving the caudate lobe and thalamus resulting in dysarthria and right-sided hemiparesis. Medical history is signficant for hypertension. He had a PEG tube placed due to dysphagia and his routine post-op CT revealed 7cm juxtarenal AAA. He had not been aware of his AAA prior to this. He was ultrimately discharged to inpatient rehab facility on 5/26/20. He returns today to discuss his AAA.   He has been doing well in rehab. Was discharged home today. He has regained some function on the right side. His aphasia is markedly improved. Tolerating soft foods by mouth and receiving tube feeds via PEG.   Denies abdominal or chest pain. He reports chronic lower back pain.     PSH: appendectomy  Former smoker     Family History: No family history of aneurysm or stroke/MI    No past medical history on file.        Social History     Tobacco Use   Smoking Status Former Smoker   Smokeless Tobacco Never Used        Review of Systems   Constitution: Negative for chills, decreased appetite and fever.   HENT: Negative for congestion, sore throat and stridor.    Eyes: Negative for blurred vision and discharge.   Respiratory: Negative for cough and hemoptysis.    Hematologic/Lymphatic: Negative for bleeding problem.   Skin: Negative for poor wound healing.   Gastrointestinal: Negative for bloating, abdominal pain, nausea and vomiting.   Genitourinary:        Urinary retention   Neurological: Positive for focal weakness. Negative for seizures.   Psychiatric/Behavioral: Negative for altered mental status.   Allergic/Immunologic: Negative for hives and persistent infections.         II. PHYSICAL EXAM     Physical Exam    Constitutional: He is oriented to person, place, and time. He appears well-developed and well-nourished.   HENT:   Head: Normocephalic and atraumatic.   Eyes: Pupils are equal, round, and reactive to light.   Neck: Normal range of motion. Neck supple.   Cardiovascular: Normal rate and regular rhythm.   Pulmonary/Chest: Effort normal. No respiratory distress. He has no wheezes.   Abdominal: Soft. He exhibits no distension. There is no abdominal tenderness.   PEG in place   Neurological: He is alert and oriented to person, place, and time.   Right sided hemiplegia  Mild aphasia   Skin: Skin is warm and dry.   Psychiatric: He has a normal mood and affect. His behavior is normal.   Nursing note and vitals reviewed.  EXT: pitting edema in bilateral lower extremities  RLE: 2+ femoral, popliteal, PT  LLE: 2+ femoral, popliteal, PT    III. ASSESSMENT & PLAN (MEDICAL DECISION MAKING)     1. AAA (abdominal aortic aneurysm) without rupture        Imaging Results:  No new imaging    Assessment/Diagnosis and Plan:  75 y.o. male with recent large central hemorrhagic stroke involving the caudate lobe and thalamus resulting in dysarthria and right-sided hemiparesis. Incidentally found 7cm juxtarenal AAA - asymptomatic. Discussed 20-40% annual risks of rupture with 7cm AAA but explained that patient would be poor surgical candidate at this time and high risk for potentially fatal complications after his recent hemorrhagic stroke.    -Defer surgical intervention at this time due to deconditioned status and recent hemorrhagic stroke  -Will communicate with neurology about timing for when anticoagulation could safely be administered with surgery  -Continue current home meds. Control BP <140/90  -Recommend initiation of high intensity statin  (atorvastatin 40mg) in light of AAA/vascular disease. Will defer to PCP  -RTC in 3 months with CTA abd/pelvis    GAGE Mcdowell II, MD, RPVI  Vascular Surgeon  [facility]  [place]

## 2020-06-20 PROCEDURE — G0180 MD CERTIFICATION HHA PATIENT: HCPCS | Mod: ,,, | Performed by: PHYSICAL MEDICINE & REHABILITATION

## 2020-06-20 PROCEDURE — G0180 PR HOME HEALTH MD CERTIFICATION: ICD-10-PCS | Mod: ,,, | Performed by: PHYSICAL MEDICINE & REHABILITATION

## 2020-06-23 ENCOUNTER — TELEPHONE (OUTPATIENT)
Dept: PHYSICAL MEDICINE AND REHAB | Facility: CLINIC | Age: 75
End: 2020-06-23

## 2020-06-23 DIAGNOSIS — I71.40 AAA (ABDOMINAL AORTIC ANEURYSM) WITHOUT RUPTURE: Primary | ICD-10-CM

## 2020-06-23 NOTE — TELEPHONE ENCOUNTER
----- Message from Rashida Cristina sent at 6/23/2020 10:48 AM CDT -----  Contact: April[employer] [employer]  Please call April at [phone] if any questions    Otherwise contact the patient at [phone] or [phone]    Requesting the provider substitute this medication for insurance approval (lansoprazole (PREVACID) 3 mg/mL oral suspension kit)    Use [facility] [address] [place], [place] [address] [phone] (Phone)  [phone] (Fax)    Thank you

## 2020-07-07 ENCOUNTER — EXTERNAL HOME HEALTH (OUTPATIENT)
Dept: HOME HEALTH SERVICES | Facility: HOSPITAL | Age: 75
End: 2020-07-07
Payer: MEDICARE

## 2020-07-23 ENCOUNTER — DOCUMENT SCAN (OUTPATIENT)
Dept: HOME HEALTH SERVICES | Facility: HOSPITAL | Age: 75
End: 2020-07-23
Payer: MEDICARE

## 2020-07-30 ENCOUNTER — DOCUMENT SCAN (OUTPATIENT)
Dept: HOME HEALTH SERVICES | Facility: HOSPITAL | Age: 75
End: 2020-07-30
Payer: MEDICARE

## 2020-08-18 PROBLEM — R13.12 OROPHARYNGEAL DYSPHAGIA: Status: ACTIVE | Noted: 2020-05-20

## 2020-09-01 ENCOUNTER — DOCUMENT SCAN (OUTPATIENT)
Dept: HOME HEALTH SERVICES | Facility: HOSPITAL | Age: 75
End: 2020-09-01
Payer: MEDICARE

## 2020-09-04 ENCOUNTER — TELEPHONE (OUTPATIENT)
Dept: VASCULAR SURGERY | Facility: CLINIC | Age: 75
End: 2020-09-04

## 2020-09-04 NOTE — TELEPHONE ENCOUNTER
"Per Dr SUMA Mcdowell, spoke with Mrs Vega to confirm she wanted all Mr Vega's appointments and procedures canceled. Mrs Vega states, " that's correct, Dr Schaefer in [place] will do Mr Gary procedure."  "

## 2020-09-04 NOTE — TELEPHONE ENCOUNTER
----- Message from Patrica Mccrary sent at 9/4/2020  2:30 PM CDT -----  Regarding: Cancel appt  Pt wife Unique called to cancel pt appts and procedure all appts . Pt will be having procedure done in [place].    Unique can be reached at [phone]    Thank you!

## 2020-09-08 ENCOUNTER — DOCUMENT SCAN (OUTPATIENT)
Dept: HOME HEALTH SERVICES | Facility: HOSPITAL | Age: 75
End: 2020-09-08
Payer: MEDICARE

## 2020-09-15 PROBLEM — I71.40 AAA (ABDOMINAL AORTIC ANEURYSM): Status: ACTIVE | Noted: 2020-09-15

## 2022-02-27 PROBLEM — K92.1 MELENA: Status: ACTIVE | Noted: 2022-02-27

## 2022-02-27 PROBLEM — Z86.79 STATUS POST ENDOVASCULAR ANEURYSM REPAIR (EVAR): Status: ACTIVE | Noted: 2022-02-27

## 2022-02-27 PROBLEM — Z98.890 STATUS POST ENDOVASCULAR ANEURYSM REPAIR (EVAR): Status: ACTIVE | Noted: 2022-02-27

## 2022-02-28 PROBLEM — L71.1 RHINOPHYMA: Status: ACTIVE | Noted: 2022-02-28

## 2022-02-28 PROBLEM — J44.9 STAGE 2 MODERATE COPD BY GOLD CLASSIFICATION: Status: ACTIVE | Noted: 2022-02-28

## 2022-02-28 PROBLEM — D64.9 NORMOCYTIC ANEMIA: Status: ACTIVE | Noted: 2022-02-28

## 2022-02-28 PROBLEM — R16.0 HYPODENSE MASS OF LIVER: Status: ACTIVE | Noted: 2022-02-28

## 2022-02-28 PROBLEM — L91.8 SKIN TAG: Status: ACTIVE | Noted: 2022-02-28

## 2022-02-28 PROBLEM — R25.1 TREMOR: Status: ACTIVE | Noted: 2022-02-28

## 2022-02-28 PROBLEM — D62 ACUTE BLOOD LOSS ANEMIA: Status: ACTIVE | Noted: 2022-02-28

## 2022-02-28 PROBLEM — Z79.899 POLYPHARMACY: Status: ACTIVE | Noted: 2022-02-28

## 2022-02-28 PROBLEM — I10 ESSENTIAL HYPERTENSION: Chronic | Status: ACTIVE | Noted: 2020-05-14

## 2022-02-28 PROBLEM — K26.4 DUODENAL ULCER HEMORRHAGE: Status: ACTIVE | Noted: 2022-02-28

## 2022-10-25 PROBLEM — S72.91XA FRACTURE OF RIGHT FEMUR: Status: ACTIVE | Noted: 2022-10-25

## 2022-10-27 PROBLEM — J96.01 ACUTE HYPOXEMIC RESPIRATORY FAILURE: Status: ACTIVE | Noted: 2022-10-27

## 2022-11-02 PROBLEM — E63.9 INADEQUATE DIETARY ENERGY INTAKE: Status: ACTIVE | Noted: 2022-11-02

## 2023-01-30 PROBLEM — J96.01 ACUTE HYPOXEMIC RESPIRATORY FAILURE: Status: RESOLVED | Noted: 2022-10-27 | Resolved: 2023-01-30

## 2024-01-05 PROBLEM — U07.1 COVID-19: Status: ACTIVE | Noted: 2024-01-05

## 2024-06-26 NOTE — PROGRESS NOTES
[facility]  Vascular Neurology  Comprehensive Stroke Center  Progress Note    Assessment/Plan:     * Nontraumatic intracerebral hemorrhage  76 yo M with no known PMHx presents to [facility] 05/14/2020 transferred from OSH for large L thalamic and caudate ICH with intraventricular extension.  Of note, patient had SBP 240s documented at OSH.  Pt otherwise has no clear risk factors for ICH.  Does not appear to have cognitive deficits on exam, no reported concern for memory issues.  No medications, no EtOH use.  No recent trauma.  No unexplained weight loss or B symptoms.  Admitted to Neuro ICU w/ NSGY following.  Etiology likely hypertensive, rule out vascular malformation w/ further imaging. Repeat CT Head in 5/17: stable without evidence for significant interval detrimental change.    Exam stable.    Antithrombotics for secondary stroke prevention:  None--ICH  Statins for secondary stroke prevention/HLD: LDL elevated--would not start statin acutely in setting of ICH, but could discuss low dose statin for reduction of stroke risk factors in stroke clinic follow up  Aggressive risk factor modification: HTN  Rehab efforts: PT/OT/SLP/PM&R  Diagnostics ordered/pending: CTA head to eval for vascular malformation  VTE prophylaxis: None: Reason for No Pharmacological VTE Prophylaxis: History of systemic or intracranial bleeding  BP parameters: ICH: SBP <140    Respiratory distress  Patient with increased work of breathing, tachypnea, and hypoxia.  Chest x-ray with pulmonary edema.  Patient afebrile with no leukocytosis  -IVF discontinued.   -Lasix 20 mg given with good output, but no sustained improvement noted  -BNP - 310  -Troponin mildly elevated but trending down - most likely ischemic demand  -EKG with no acute changes.  -Consult to IM - appreciate recommendations   -Plan for deep suctioning, oxygen, chest PT, and rule out COVID   -Will continue to follow      Dysphagia  NG tube in place  Plans for MBSS  Ptosis GVF/External photos done ou./ rel/fix/coop. Good ou./ chart checked for latex allergy./ h   tomorrow if patient can tolerate  TF on hold due to respiratory status    Dysarthria  -2/2 ICH, continue SLP eval and treat    Hemiparesis, right  -2/2 ICH, continue PT eval and treat    Essential hypertension  -Stroke risk factor, likely etiology of ICH in this pt  - Med regiment adjusted:   Coreg 12.5mg BID   Norvasc 10mg Daily   HCTZ 12.5mg Daily - to start 5/21 (was given Lasix 20 x 1 dose 5/20)  Continuing hydralazine until BP stabilized and will change to PRN only             05/14/2020:  Transferred to [facility] [place] [facility] from [facility] for L ICH/IVH from caudate and thalamus  05/15/2020:   Continues on cardene for SBP < 140, neurosurgery following.  05/16/2020:   Started back on cardene for SBP < 140 this AM  05/18/2020:  Off Cardene drip, placed on Losartan and Hydralazine. Lovenox.  05/19/2010:  /180. Still with Rt side weakness.   5/20: Stepped down overnight. Increased work of breathing, tachypnic.  IVF discontinued.  Crackles in bases and expiratory wheezes.  CXR with pulmonary edema.  Lasix given with good output but no sustained improvement of breathing.  Currently COVID rule out.  IM following. Blood pressure elevated - changed to carvedilol 12.5mg BID, Norvasc 10mg daily, with plans to start HCTZ tomorrow.  Continuing Hydralazine until BP stable and can change to PRN only. TF on hold due to respiratory status.  Scheduled for MBSS tomorrow if able to tolerate.    STROKE DOCUMENTATION   Acute Stroke Times   Last Known Normal Time: 0300  Symptom Onset Date: 05/14/20  Symptom Onset Time: 0700  Stroke Team Called Time: 0806  Stroke Team Arrival Time: 0809  CT Interpretation Time: 0809  Decision to Treat Time for Alteplase: (Contraindicated 2/2 ICH)  Decision to Treat Time for IR: (no LVO)    NIH Scale:  1a. Level of Consciousness: 0-->Alert, keenly responsive  1b. LOC Questions: 0-->Answers both questions correctly  1c. LOC Commands: 0-->Performs both tasks correctly  2. Best Gaze: 0-->Normal  3. Visual:  0-->No visual loss  4. Facial Palsy: 0-->Normal symmetrical movements  5a. Motor Arm, Left: 0-->No drift, limb holds 90 (or 45) degrees for full 10 secs  5b. Motor Arm, Right: 3-->No effort against gravity, limb falls  6a. Motor Leg, Left: 0-->No drift, leg holds 30 degree position for full 5 secs  6b. Motor Leg, Right: 3-->No effort against gravity, leg falls to bed immediately  7. Limb Ataxia: 0-->Absent  8. Sensory: 0-->Normal, no sensory loss  9. Best Language: 0-->No aphasia, normal  10. Dysarthria: 1-->Mild-to-moderate dysarthria, patient slurs at least some words and, at worst, can be understood with some difficulty  11. Extinction and Inattention (formerly Neglect): 0-->No abnormality  Total (NIH Stroke Scale): 7       Modified [place]    [place] Coma Scale:    ABCD2 Score:    [id number] Score:   HAS -BLED Score:   ICH Score:   Hunt & Mccray Classification:      Hemorrhagic change of an Ischemic Stroke: Does this patient have an ischemic stroke with hemorrhagic changes? No     Neurologic Chief Complaint: Nontraumatic intracerebral hemorrhage    Subjective:     Interval History: Patient is seen for follow-up neurological assessment and treatment recommendations: Stepped down overnight. Increased work of breathing, tachypnic.  IVF discontinued.  Crackles in bases and expiratory wheezes.  CXR with pulmonary edema.  Lasix given with good output but no sustained improvement of breathing.  Currently COVID rule out.  IM following. Blood pressure elevated - changed to carvedilol 12.5mg BID, Norvasc 10mg daily, with plans to start HCTZ tomorrow.  Continuing Hydralazine until BP stable and can change to PRN only. TF on hold due to respiratory status.  Scheduled for MBSS tomorrow if able to tolerate.    HPI, Past Medical, Family, and Social History remains the same as documented in the initial encounter.     Review of Systems   Constitutional: Positive for appetite change. Negative for chills and fever.   HENT: Positive  for trouble swallowing.    Eyes: Negative for visual disturbance.   Respiratory: Positive for cough, shortness of breath and wheezing. Negative for chest tightness.    Cardiovascular: Negative for chest pain.   Gastrointestinal: Negative for abdominal pain.   Genitourinary: Negative for flank pain and hematuria.   Skin: Negative.    Neurological: Positive for facial asymmetry, speech difficulty and weakness.   Hematological: Negative.    Psychiatric/Behavioral: Negative.      Scheduled Meds:   amLODIPine  10 mg Oral Daily    carvediloL  12.5 mg Oral BID    enoxaparin  40 mg Subcutaneous Daily    hydrALAZINE  75 mg Oral Q6H    [START ON 5/21/2020] hydroCHLOROthiazide  12.5 mg Oral Daily    losartan  100 mg Oral Daily    polyethylene glycol  17 g Oral Daily    senna-docusate 8.6-50 mg  1 tablet Oral BID    silodosin  4 mg Oral Daily     Continuous Infusions:    PRN Meds:acetaminophen, albuterol-ipratropium, hydrALAZINE, labetaloL, sodium chloride 0.9%    Objective:     Vital Signs (Most Recent):  Temp: 99.4 °F (37.4 °C) (05/20/20 1725)  Pulse: 87 (05/20/20 1725)  Resp: (!) 23 (05/20/20 1725)  BP: (!) 164/90 (05/20/20 1725)  SpO2: (!) 93 % (05/20/20 1725)  BP Location: Left arm    Vital Signs Range (Last 24H):  Temp:  [97.3 °F (36.3 °C)-99.4 °F (37.4 °C)]   Pulse:  [57-96]   Resp:  [17-28]   BP: (137-183)/()   SpO2:  [90 %-97 %]   BP Location: Left arm    Physical Exam   Constitutional: He appears well-developed and well-nourished.   Cardiovascular: Normal rate and regular rhythm.   Pulmonary/Chest: Tachypnea noted. He has wheezes. He has rales.   Skin: No rash noted. No erythema.       Neurological Exam:   LOC: alert  Attention Span: Good   Language: No aphasia  Articulation: Dysarthria  Orientation: Not oriented to time  Visual Fields: Full  EOM (CN III, IV, VI): Full/intact  Pupils (CN II, III): PERRL  Facial Sensation (CN V): Normal  Facial Movement (CN VII): Symmetric facial expression    Motor: Arm  left  Normal 5/5  Leg left  Paresis: 4/5  Arm right  Paresis: 1/5  Leg right Paresis: 1/5  Sensation: Jaron-hypoesthesia right  Tone: Spasticity  RUE and RLE     Laboratory:  CMP:   Recent Labs   Lab 05/20/20  1549   CALCIUM 9.3   ALBUMIN 3.3*   PROT 6.6      K 3.8   CO2 27   *   BUN 33*   CREATININE 0.8   ALKPHOS 64   ALT 21   AST 24   BILITOT 0.4     CBC:   Recent Labs   Lab 05/20/20  0342   WBC 8.92   RBC 4.16*   HGB 12.0*   HCT 39.1*      MCV 94   MCH 28.8   MCHC 30.7*     Lipid Panel:   Recent Labs   Lab 05/14/20  1251   CHOL 183  183   LDLCALC 117.2  117.2   HDL 54  54   TRIG 59  59     Hgb A1C:   Recent Labs   Lab 05/14/20  1251   HGBA1C 5.4  5.4       Diagnostic Results     Brain Imaging   CT head:   - Left basal ganglia parenchymal hemorrhage with intraventricular extension again noted, the overall appearance is stable without evidence for significant interval detrimental change.     Cardiac Imaging   TTE:  EF 55%, normal RV diastolic function. No regurgitation or thrombus reported.      Gina Chu NP  Comprehensive Stroke Center  Department of Vascular Neurology   [facility]